# Patient Record
Sex: MALE | Race: BLACK OR AFRICAN AMERICAN | NOT HISPANIC OR LATINO | Employment: OTHER | URBAN - METROPOLITAN AREA
[De-identification: names, ages, dates, MRNs, and addresses within clinical notes are randomized per-mention and may not be internally consistent; named-entity substitution may affect disease eponyms.]

---

## 2019-05-13 LAB — INR PPP: 1 (ref 0.9–1.2)

## 2020-09-30 LAB
CREAT SERPL-MCNC: 1.03 MG/DL (ref 0.74–1.35)
GFR SERPL CREATININE-BSD FRML MDRD: 85 ML/MIN/BSA
GLUCOSE SERPL-MCNC: 107 MG/DL (ref 70–140)
POTASSIUM SERPL-SCNC: 3.3 MMOL/L (ref 3.6–5.2)
TSH SERPL-ACNC: 0.6 MIU/L (ref 0.3–4.2)

## 2020-11-12 ENCOUNTER — TRANSFERRED RECORDS (OUTPATIENT)
Dept: HEALTH INFORMATION MANAGEMENT | Facility: CLINIC | Age: 50
End: 2020-11-12

## 2020-12-04 ENCOUNTER — TRANSFERRED RECORDS (OUTPATIENT)
Dept: MULTI SPECIALTY CLINIC | Facility: CLINIC | Age: 50
End: 2020-12-04

## 2020-12-04 LAB
ALT SERPL-CCNC: 34 U/L (ref 7–55)
AST SERPL-CCNC: 29 U/L (ref 8–48)
CHOLEST SERPL-MCNC: 151 MG/DL
HBA1C MFR BLD: 6 % (ref 4–5.6)
HDLC SERPL-MCNC: 55 MG/DL
LDLC SERPL CALC-MCNC: 77 MG/DL
NONHDLC SERPL-MCNC: 96 MG/DL
TRIGL SERPL-MCNC: 96 MG/DL

## 2021-01-26 ENCOUNTER — OFFICE VISIT (OUTPATIENT)
Dept: FAMILY MEDICINE | Facility: CLINIC | Age: 51
End: 2021-01-26
Payer: MEDICARE

## 2021-01-26 VITALS
BODY MASS INDEX: 26.66 KG/M2 | TEMPERATURE: 96.8 F | OXYGEN SATURATION: 98 % | SYSTOLIC BLOOD PRESSURE: 106 MMHG | RESPIRATION RATE: 16 BRPM | HEIGHT: 73 IN | DIASTOLIC BLOOD PRESSURE: 62 MMHG | HEART RATE: 73 BPM | WEIGHT: 201.2 LBS

## 2021-01-26 DIAGNOSIS — F17.200 TOBACCO USE DISORDER: ICD-10-CM

## 2021-01-26 DIAGNOSIS — F20.9 SCHIZOPHRENIA, UNSPECIFIED TYPE (H): ICD-10-CM

## 2021-01-26 DIAGNOSIS — E78.2 MIXED HYPERLIPIDEMIA: Primary | ICD-10-CM

## 2021-01-26 PROBLEM — H57.10 PAIN IN EYE: Status: ACTIVE | Noted: 2017-12-18

## 2021-01-26 PROBLEM — R39.15 URINARY URGENCY: Status: ACTIVE | Noted: 2019-03-19

## 2021-01-26 PROBLEM — K21.00 REFLUX ESOPHAGITIS: Status: ACTIVE | Noted: 2019-08-23

## 2021-01-26 PROBLEM — B36.0 PITYRIASIS VERSICOLOR: Status: ACTIVE | Noted: 2018-06-25

## 2021-01-26 PROBLEM — R41.83 BORDERLINE INTELLECTUAL FUNCTIONING: Status: ACTIVE | Noted: 2017-01-16

## 2021-01-26 PROBLEM — K63.5 POLYP OF COLON: Status: ACTIVE | Noted: 2019-09-23

## 2021-01-26 PROBLEM — F51.04 PSYCHOPHYSIOLOGICAL INSOMNIA: Status: ACTIVE | Noted: 2019-10-23

## 2021-01-26 PROBLEM — R10.32 LEFT LOWER QUADRANT ABDOMINAL PAIN: Status: ACTIVE | Noted: 2019-08-06

## 2021-01-26 PROBLEM — N39.44 NOCTURNAL ENURESIS: Status: ACTIVE | Noted: 2019-10-02

## 2021-01-26 PROBLEM — B37.81 CANDIDIASIS OF ESOPHAGUS (H): Status: ACTIVE | Noted: 2019-08-26

## 2021-01-26 PROBLEM — B96.81 GASTRITIS DUE TO HELICOBACTER SPECIES: Status: ACTIVE | Noted: 2019-08-26

## 2021-01-26 PROBLEM — K29.70 GASTRITIS DUE TO HELICOBACTER SPECIES: Status: ACTIVE | Noted: 2019-08-26

## 2021-01-26 PROBLEM — F22 DELUSIONAL DISORDER (H): Status: ACTIVE | Noted: 2017-01-17

## 2021-01-26 PROBLEM — B07.0 VERRUCA PLANTARIS: Status: ACTIVE | Noted: 2018-10-24

## 2021-01-26 PROBLEM — F12.10 NONDEPENDENT CANNABIS ABUSE: Status: ACTIVE | Noted: 2017-01-16

## 2021-01-26 PROBLEM — G45.9 TRANSIENT ISCHEMIC ATTACK: Status: ACTIVE | Noted: 2018-01-25

## 2021-01-26 PROBLEM — G24.01 DRUG INDUCED SUBACUTE DYSKINESIA: Status: ACTIVE | Noted: 2017-01-16

## 2021-01-26 PROBLEM — F25.0 SCHIZOAFFECTIVE DISORDER, BIPOLAR TYPE (H): Status: ACTIVE | Noted: 2017-01-16

## 2021-01-26 PROBLEM — K59.00 CONSTIPATION: Status: ACTIVE | Noted: 2019-08-06

## 2021-01-26 PROBLEM — R63.0 ANOREXIA: Status: ACTIVE | Noted: 2019-10-02

## 2021-01-26 PROBLEM — J45.30 MILD PERSISTENT ASTHMA: Status: ACTIVE | Noted: 2018-04-27

## 2021-01-26 PROBLEM — K62.5 RECTAL HEMORRHAGE: Status: ACTIVE | Noted: 2019-08-06

## 2021-01-26 PROBLEM — E78.5 HYPERLIPIDEMIA: Status: ACTIVE | Noted: 2018-01-23

## 2021-01-26 PROBLEM — R07.9 CHEST PAIN: Status: ACTIVE | Noted: 2018-01-25

## 2021-01-26 PROCEDURE — 99204 OFFICE O/P NEW MOD 45 MIN: CPT | Performed by: FAMILY MEDICINE

## 2021-01-26 RX ORDER — ROSUVASTATIN CALCIUM 10 MG/1
10 TABLET, COATED ORAL DAILY
Qty: 90 TABLET | Refills: 3 | Status: SHIPPED | OUTPATIENT
Start: 2021-01-26 | End: 2021-12-17

## 2021-01-26 RX ORDER — ASPIRIN 81 MG/1
81 TABLET, CHEWABLE ORAL
COMMUNITY
Start: 2020-12-09 | End: 2021-02-16

## 2021-01-26 RX ORDER — NICOTINE 21 MG/24HR
1 PATCH, TRANSDERMAL 24 HOURS TRANSDERMAL EVERY 24 HOURS
Qty: 30 PATCH | Refills: 0 | Status: SHIPPED | OUTPATIENT
Start: 2021-01-26 | End: 2022-02-08

## 2021-01-26 RX ORDER — AMOXICILLIN 250 MG
1 CAPSULE ORAL
COMMUNITY
Start: 2020-12-09 | End: 2021-02-16

## 2021-01-26 RX ORDER — OLANZAPINE 15 MG/1
15 TABLET ORAL AT BEDTIME
COMMUNITY
Start: 2020-12-09 | End: 2022-02-08

## 2021-01-26 RX ORDER — ROSUVASTATIN CALCIUM 10 MG/1
10 TABLET, COATED ORAL
COMMUNITY
Start: 2020-12-09 | End: 2021-01-26

## 2021-01-26 RX ORDER — ACETAMINOPHEN 500 MG
500 TABLET ORAL
COMMUNITY
Start: 2020-10-19 | End: 2021-04-12

## 2021-01-26 RX ORDER — FAMOTIDINE 20 MG/1
20 TABLET, FILM COATED ORAL
COMMUNITY
Start: 2020-12-09 | End: 2021-02-16

## 2021-01-26 RX ORDER — LANOLIN ALCOHOL/MO/W.PET/CERES
6 CREAM (GRAM) TOPICAL
COMMUNITY
Start: 2020-12-09 | End: 2022-06-14

## 2021-01-26 ASSESSMENT — MIFFLIN-ST. JEOR: SCORE: 1823.58

## 2021-01-26 NOTE — PROGRESS NOTES
"  Assessment & Plan     Mixed hyperlipidemia  Reviewed Care Everywhere. Patient had normal FLP a month ago.  Reinforced tatin use.  Reinforced heart healthy lifestyle.  Repeat FLP in Dec 2021  - rosuvastatin (CRESTOR) 10 MG tablet  Dispense: 90 tablet; Refill: 3    Schizophrenia, unspecified type (H)  At end of visit he mentioned to provider that he would like to have \"this snake that is growing in my taken out\", but did not want to pursue this today.  Patient states he sees psychiatry. Patient was advised to make sure he follows up with psychiatry.    Tobacco use disorder  Patient requested Rx for nicotine patch.  Advised safe use of this and how to improve cessation success,.  Follow up with PCP in 1 month for continuation of process.  - nicotine (NICODERM CQ) 21 MG/24HR 24 hr patch  Dispense: 30 patch; Refill: 0      Review of prior external note(s) from - CareEverywhere information from [place] reviewed  Review of the result(s) of each unique test - fasting lipid panel    Tobacco Cessation:   reports that he has been smoking cigarettes. He has a 11.50 pack-year smoking history. He has never used smokeless tobacco.  Tobacco Cessation Action Plan: Pharmacotherapies : Nicotine patch    BMI:   Estimated body mass index is 26.91 kg/m  as calculated from the following:    Height as of this encounter: 1.842 m (6' 0.5\").    Weight as of this encounter: 91.3 kg (201 lb 3.2 oz).   Weight management plan: Discussed healthy diet and exercise guidelines      Patient Instructions     Refill for rosuvastatin has been sent to your pharmacy.    Be consistent with low trans fat and saturated fat diet.  Eat food rich in omega-3-fatty acids as you tolerate. (salmon, olive oil)  Eat 5 cups of vegetables, fruits and whole grains per day.  Limit starchy food (white rice, white bread, white pasta, white potatoes) to less than a cup per meal.  Minimize sweets, junk food and fastfood. Limit soda beverages to one serving per day; best to " avoid it altogether though.  Exercise: moderate intensity sustained for at least 30 mins per episode, goal of 150 mins per week at least  Combine cardiovascular and resistance exercises.  These exercise recommendations are in addition to your daily activity at work or home.  Work on losing weight if you are above your goal body mass index.    You preferred to start nicotine patch to stop smoking.  Use this as directed.  When you start the patches, you need to completely stop smoking cigarettes or using other nicotine products.    Patient Education     Low-Fat Diet     A low-fat diet will help you lose weight. It also can lower cholesterol and prevent symptoms of gallbladder disease. The average American diet contains up to 50% fat. This means that half of all calories come from fat (about 80 grams to 100 grams of fat per day). Choosing normal portions of foods from the list below can help lower your fat intake. Experts recommend that only 20% to 35% of your daily calories come from fat. The remaining 65% to 80% of calories will come from protein and carbohydrates.  Breads  OK: Whole-wheat or rye bread, lana or soda crackers, julia toast, plain rolls, bagels, English muffins  Don't have: Rolls and breads containing whole milk or egg; waffles, pancakes, biscuits, corn bread; cheese crackers, other flavored crackers, pastries, doughnuts  Cereals  OK: Oatmeal, whole-wheat, bran, multigrain, rice  Don't have:Granola or other cereals that have oil, coconut, or more than 2 grams of fat per serving  Cheese and eggs  OK: Cheeses labeled low-fat; 3 whole eggs per week; egg whites and egg substitutes as desired  Don't have: All other cheeses  Desserts  OK: Gelatin, slushy, edvin food cake, meringues, nonfat yogurt, and puddings or sherbet made with nonfat milk  Don't have: Any other store-bought desserts, or desserts that have fat, whole milk, cream, chocolate, and coconut  Drinks  OK: Nonfat milk, coffee, tea, fizzy  (carbonated) drinks  Don't have:Whole and reduced-fat milk, evaporated and condensed milk, hot chocolate mixes, milk shakes, malts, eggnog  Fats  OK: You may have up to 3 teaspoons of fat daily. This can be butter, margarine, mayonnaise, or healthy oils (canola or olive)  Don't have: Cream, nondairy creams, cream cheese, gravies, and cream sauces  Fruits  OK: All fruits made without fat  Don't have: Coconut, olives  Meats, poultry, fish  OK: Limit meat to 6 ounces daily (broiled, roasted, baked, grilled, or boiled). Buy lean cuts, and trim off the fat. Try beef, fish, lamb, pork, and canned fish packed in water; also chicken and turkey with the skin removed.  Don't have: Fried meats, fish, or poultry; fried eggs, and fish canned in oils; fatty meats such as walton, sausage, corned beef, hot dogs, and lunch meats; meats with gravies and sauces  Potatoes, beans, pasta  OK: Dried beans, split peas, lentils, potatoes, rice, pasta made without added fat  Don't have: French fries, potato chips, potatoes prepared with butter, refried beans  Soups  OK: Clear broth soups without fat and with allowed vegetables  Don't have: Cream-based soups  Vegetables  OK: Fresh, frozen, canned or dried vegetables, all made without added fat  Don't have: Fried vegetables and those prepared with butter, cream, sauces  Other foods  OK: Salt, sugar, jelly, hard candy, marshmallows, honey, syrup, spices and herbs, mustard, ketchup, lemon, and vinegar. Try to limit sweets and added sugars.  Don't have: Chocolate, nuts, coconut, and cream candies; sunflower, sesame, and other seeds; fried foods; cream sauces and gravies; pizza  [employer] last reviewed this educational content on 10/1/2019    1349-4130 [employer], [employer]. [address] [place] [address]. All rights reserved. This information is not intended as a substitute for professional medical care. Always follow your healthcare professional's instructions.           Patient  Education     Tips for Quitting Smoking (Cardiovascular)  Quitting smoking is a gift to yourself. It's one of the best things you can do to keep your heart disease from getting worse. Smoking reduces oxygen flow to your heart. It does this in 2 ways. It speeds the buildup of plaque along the artery walls. And it changes the health of your blood vessels. This raises your risk for heart attack, also known as acute myocardial infarction (AMI). Quitting helps reduce smoking's harmful effects. You may have tried to quit before, but don t give up. Try again. Many smokers try a few times before they succeed. It's never too early to benefit from quitting smoking. This is especially true if you already have ongoing (chronic) conditions such as high blood pressure and high cholesterol. These put you at higher risk for cardiovascular disease. Quitting smoking is a powerful way to reduce your risk for coronary disease.   Line up help      Ask for the support of your family and friends.    Join a smoking cessation class. Or ask your healthcare provider for a referral to a psychologist who specializes in helping people quit smoking.     Ask your healthcare provider about nicotine replacement products. Also ask about prescription medicines that can help you quit. It may take some time to find a product and schedule that works for you.  Set a quit date    Choose a date in the next 2 to 4 weeks.    After picking a day, irene it in bold letters on a calendar.    Your quit list  Ideas to stop smoking include:  1. Start by giving up cigarettes at the times you least need them.  2. Keep some fruit close by at the times you are most likely to reach for a cigarette. For many people, smoking has an oral fixation component. This must be recognized and replaced by a healthy habit.  3. Use a nicotine replacement product instead of a cigarette.  Write down a few more  ideas:  ______________________________________________________________________________  ______________________________________________________________________________  ______________________________________________________________________________  Set limits    Limit where you can smoke. Pick one room or a porch. Smoke only in that place.    Make smoking outdoors a house rule. Other smokers won t tempt you as much.    Talk with smokers around you about your intent to stop smoking. Then they can show consideration for you and limit their smoking around you.    Hang a list of  quit benefits  in the spot where you smoke. Put one on the refrigerator and one on your car dashboard.    For more information    National Cancer [place] Smoking Quitlinesmokefree.gov/qhhg-oz-dy-hqfeac063-91B-FDYY (368-002-7185)    [employer] last reviewed this educational content on 11/1/2019 2000-2020 [employer]. [address] [place] [address], [place] [address]. All rights reserved. This information is not intended as a substitute for professional medical care. Always follow your healthcare professional's instructions.           Patient Education     Staying Smoke-Free  Quitting smoking is a big change. People will congratulate you. You have the right to be proud. But later at times you may miss smoking. Plan ahead to resist temptation.   Prepare to be tempted    If you feel the urge to smoke, distract yourself for about 5 to 15 minutes. Drink water. Call a friend, take a walk, or try deep breathing. Chew gum. Usually, the urge to smoke will pass.    Don t trust yourself to have  just one cigarette.  Many ex-smokers get hooked again that way.    Remind yourself why you quit. Tell yourself you can stay quit.    Stay away from people or places that can trigger you to smoke. Ask others not to smoke in your home or car.    Spend time in places where you can t smoke, such as a museum, a library, a store, or a gym.    Take your nonsmoking  life one day at a time. Ruy each day on your calendar.    HALT your desire. Keep yourself from feeling too Hungry, Angry, Lonely, or Tired. Deal with your real needs. Eat, talk, or sleep.    Reward yourself! Put aside cigarette money and buy yourself a treat.    Talk with your healthcare provider about using quit-smoking products. These include medicine or a nicotine patch, inhaler, nasal spray, gum, or lozenges. These can help increase your success by reducing urges.     Deep breathing can help ease the urge to smoke.     If you slip  You may slip and smoke again. Many ex-smokers slip on the way to success. If you do, it s not the end of your quit process. Think about what triggered you to smoke. Then think of ways to prevent future slips. Ask yourself what you can learn from the slip. Decide how you will handle this trigger better in the future. Then get back on track--right away!   Don t give up  Keep telling yourself you re no longer a smoker. Don t lose hope. Most people have tried to quit several times before being successful. Try to stay focused on your plan to be smoke-free. Keep in mind all the benefits of staying quit. Millions of people have given up smoking. You can too.   To learn more    www.cdc.gov/tobacco/quit_smoking/ 800-QUIT-NOW (430-523-6761)    www.smokefree.gov 877-44U-QUIT (946-298-1706)    www.lung.org/stop-smoking/ 800-LUNGUSA (497-229-9401)    [employer] last reviewed this educational content on 5/1/2019 2000-2020 [employer]. [address] [place] [address], [place] [address]. All rights reserved. This information is not intended as a substitute for professional medical care. Always follow your healthcare professional's instructions.               Return in about 1 month (around 2/26/2021) for tobacco cessation follow up.    Dixon Pederson MD  [facility]    Funmilayo Champagne is a 50 year old who presents to clinic today for the following health  "issues   Chief Complaint   Patient presents with     Establish Care     Pt here to establish care with new provider, transferring care from [facility].     Refill Request     cholesterol medication   Records in care everywhere.    HPI       Hyperlipidemia Follow-Up      Are you regularly taking any medication or supplement to lower your cholesterol?   Yes- rovustatin    Are you having muscle aches or other side effects that you think could be caused by your cholesterol lowering medication?  No  Patient said he has had many bouts of chest pain before - has been diagnosed with \"mild heart attacks\".  Patient reports he is not seeing a cardiologist.  Denies current chest pain.  Patient reports has also a hx of GERD.  Patient was driven by a friend to clinic today.    Denies chest pain currently, dyspnea, HA, BOV, dizziness or urinary changes.      How many servings of fruits and vegetables do you eat daily?  4 or more    On average, how many sweetened beverages do you drink each day (Examples: soda, juice, sweet tea, etc.  Do NOT count diet or artificially sweetened beverages)?   7 or 8 juice    How many days per week do you exercise enough to make your heart beat faster? 7    How many minutes a day do you exercise enough to make your heart beat faster? Walks for 1/2 mile    How many days per week do you miss taking your medication? 0    Medication Followup of randa    Taking Medication as prescribed: yes    Side Effects:  None    Medication Helping Symptoms:  yes     Patient sees a psychiatrist locally. He cannot remember the name.    Patient Active Problem List   Diagnosis     Schizophrenia (H)     M[place]: positive     History reviewed. No pertinent surgical history.    Social History     Tobacco Use     Smoking status: Current Every Day Smoker     Packs/day: 0.50     Years: 23.00     Pack years: 11.50     Types: Cigarettes     Smokeless tobacco: Never Used     Tobacco comment: Slowing down a lot " "  Substance Use Topics     Alcohol use: No     Family History   Problem Relation Age of Onset     Diabetes Mother      Coronary Artery Disease Mother      Coronary Artery Disease Father      Cancer No family hx of          Current Outpatient Medications   Medication Sig Dispense Refill     acetaminophen (TYLENOL) 500 MG tablet Take 500 mg by mouth       aspirin (ASA) 81 MG chewable tablet Take 81 mg by mouth       famotidine (PEPCID) 20 MG tablet Take 20 mg by mouth       melatonin 3 MG tablet Take 6 mg by mouth       OLANZapine (ZYPREXA) 15 MG tablet Take 15 mg by mouth       rosuvastatin (CRESTOR) 10 MG tablet Take 10 mg by mouth       senna-docusate (SENOKOT-S/PERICOLACE) 8.6-50 MG tablet Take 1 tablet by mouth       Allergies   Allergen Reactions     Fish Allergy      Fish Oil      Latex      Olive Oil      Review of Systems   Constitutional, HEENT, cardiovascular, pulmonary, GI, , musculoskeletal, neuro, skin, endocrine and psych systems are negative, except as otherwise noted.      Objective    /62   Pulse 73   Temp 96.8  F (36  C) (Tympanic)   Resp 16   Ht 1.842 m (6' 0.5\")   Wt 91.3 kg (201 lb 3.2 oz)   SpO2 98%   BMI 26.91 kg/m    Body mass index is 26.91 kg/m .  Physical Exam   GENERAL: alert and no distress, ambulatory w/o assist  EYES: no icterus, PERRLA  NECK: no tenderness, no adenopathy,  Thyroid not enlarged  RESP: lungs clear to auscultation - no rales, no rhonchi, no wheezes  CV: regular rates and rhythm, no murmur  MS: no edema  SKIN: no jaundice or rash  NEURO: no tremors, no akithesia, no involuntary movement.  PSYCH: well-kempt,  Slight tangentiality, present delusion of a snake growing in him, normal speech,normal mood, appropriate affect, no suicidality, no aggression, no auditory or visual hallucination today  ABD: rounded, nontender, no hepatosplenomegaly, no palpable mass    No results found for any visits on 01/26/21.        "

## 2021-01-26 NOTE — PATIENT INSTRUCTIONS
Refill for rosuvastatin has been sent to your pharmacy.    Be consistent with low trans fat and saturated fat diet.  Eat food rich in omega-3-fatty acids as you tolerate. (salmon, olive oil)  Eat 5 cups of vegetables, fruits and whole grains per day.  Limit starchy food (white rice, white bread, white pasta, white potatoes) to less than a cup per meal.  Minimize sweets, junk food and fastfood. Limit soda beverages to one serving per day; best to avoid it altogether though.  Exercise: moderate intensity sustained for at least 30 mins per episode, goal of 150 mins per week at least  Combine cardiovascular and resistance exercises.  These exercise recommendations are in addition to your daily activity at work or home.  Work on losing weight if you are above your goal body mass index.    You preferred to start nicotine patch to stop smoking.  Use this as directed.  When you start the patches, you need to completely stop smoking cigarettes or using other nicotine products.    Patient Education     Low-Fat Diet     A low-fat diet will help you lose weight. It also can lower cholesterol and prevent symptoms of gallbladder disease. The average American diet contains up to 50% fat. This means that half of all calories come from fat (about 80 grams to 100 grams of fat per day). Choosing normal portions of foods from the list below can help lower your fat intake. Experts recommend that only 20% to 35% of your daily calories come from fat. The remaining 65% to 80% of calories will come from protein and carbohydrates.  Breads  OK: Whole-wheat or rye bread, lana or soda crackers, julia toast, plain rolls, bagels, English muffins  Don't have: Rolls and breads containing whole milk or egg; waffles, pancakes, biscuits, corn bread; cheese crackers, other flavored crackers, pastries, doughnuts  Cereals  OK: Oatmeal, whole-wheat, bran, multigrain, rice  Don't have:Granola or other cereals that have oil, coconut, or more than 2 grams  of fat per serving  Cheese and eggs  OK: Cheeses labeled low-fat; 3 whole eggs per week; egg whites and egg substitutes as desired  Don't have: All other cheeses  Desserts  OK: Gelatin, slushy, edvin food cake, meringues, nonfat yogurt, and puddings or sherbet made with nonfat milk  Don't have: Any other store-bought desserts, or desserts that have fat, whole milk, cream, chocolate, and coconut  Drinks  OK: Nonfat milk, coffee, tea, fizzy (carbonated) drinks  Don't have:Whole and reduced-fat milk, evaporated and condensed milk, hot chocolate mixes, milk shakes, malts, eggnog  Fats  OK: You may have up to 3 teaspoons of fat daily. This can be butter, margarine, mayonnaise, or healthy oils (canola or olive)  Don't have: Cream, nondairy creams, cream cheese, gravies, and cream sauces  Fruits  OK: All fruits made without fat  Don't have: Coconut, olives  Meats, poultry, fish  OK: Limit meat to 6 ounces daily (broiled, roasted, baked, grilled, or boiled). Buy lean cuts, and trim off the fat. Try beef, fish, lamb, pork, and canned fish packed in water; also chicken and turkey with the skin removed.  Don't have: Fried meats, fish, or poultry; fried eggs, and fish canned in oils; fatty meats such as walton, sausage, corned beef, hot dogs, and lunch meats; meats with gravies and sauces  Potatoes, beans, pasta  OK: Dried beans, split peas, lentils, potatoes, rice, pasta made without added fat  Don't have: French fries, potato chips, potatoes prepared with butter, refried beans  Soups  OK: Clear broth soups without fat and with allowed vegetables  Don't have: Cream-based soups  Vegetables  OK: Fresh, frozen, canned or dried vegetables, all made without added fat  Don't have: Fried vegetables and those prepared with butter, cream, sauces  Other foods  OK: Salt, sugar, jelly, hard candy, marshmallows, honey, syrup, spices and herbs, mustard, ketchup, lemon, and vinegar. Try to limit sweets and added sugars.  Don't have:  Chocolate, nuts, coconut, and cream candies; sunflower, sesame, and other seeds; fried foods; cream sauces and gravies; pizza  [employer] last reviewed this educational content on 10/1/2019    4293-9331 [employer]. [address] [place] [id number]. All rights reserved. This information is not intended as a substitute for professional medical care. Always follow your healthcare professional's instructions.           Patient Education     Tips for Quitting Smoking (Cardiovascular)  Quitting smoking is a gift to yourself. It's one of the best things you can do to keep your heart disease from getting worse. Smoking reduces oxygen flow to your heart. It does this in 2 ways. It speeds the buildup of plaque along the artery walls. And it changes the health of your blood vessels. This raises your risk for heart attack, also known as acute myocardial infarction (AMI). Quitting helps reduce smoking's harmful effects. You may have tried to quit before, but don t give up. Try again. Many smokers try a few times before they succeed. It's never too early to benefit from quitting smoking. This is especially true if you already have ongoing (chronic) conditions such as high blood pressure and high cholesterol. These put you at higher risk for cardiovascular disease. Quitting smoking is a powerful way to reduce your risk for coronary disease.   Line up help      Ask for the support of your family and friends.    Join a smoking cessation class. Or ask your healthcare provider for a referral to a psychologist who specializes in helping people quit smoking.     Ask your healthcare provider about nicotine replacement products. Also ask about prescription medicines that can help you quit. It may take some time to find a product and schedule that works for you.  Set a quit date    Choose a date in the next 2 to 4 weeks.    After picking a day, irene it in bold letters on a calendar.    Your quit list  Ideas to stop  smoking include:  1. Start by giving up cigarettes at the times you least need them.  2. Keep some fruit close by at the times you are most likely to reach for a cigarette. For many people, smoking has an oral fixation component. This must be recognized and replaced by a healthy habit.  3. Use a nicotine replacement product instead of a cigarette.  Write down a few more ideas:  ______________________________________________________________________________  ______________________________________________________________________________  ______________________________________________________________________________  Set limits    Limit where you can smoke. Pick one room or a porch. Smoke only in that place.    Make smoking outdoors a house rule. Other smokers won t tempt you as much.    Talk with smokers around you about your intent to stop smoking. Then they can show consideration for you and limit their smoking around you.    Hang a list of  quit benefits  in the spot where you smoke. Put one on the refrigerator and one on your car dashboard.    For more information    National Cancer [place] Smoking Quitlinesmokefree.gov/qmbp-cl-vo-gncfdp878-19A-TFMJ (400-479-5727)    Arias last reviewed this educational content on 11/1/2019 2000-2020 [employer]. [address] [place] [address], [place] [address]. All rights reserved. This information is not intended as a substitute for professional medical care. Always follow your healthcare professional's instructions.           Patient Education     Staying Smoke-Free  Quitting smoking is a big change. People will congratulate you. You have the right to be proud. But later at times you may miss smoking. Plan ahead to resist temptation.   Prepare to be tempted    If you feel the urge to smoke, distract yourself for about 5 to 15 minutes. Drink water. Call a friend, take a walk, or try deep breathing. Chew gum. Usually, the urge to smoke will pass.    Don t trust  yourself to have  just one cigarette.  Many ex-smokers get hooked again that way.    Remind yourself why you quit. Tell yourself you can stay quit.    Stay away from people or places that can trigger you to smoke. Ask others not to smoke in your home or car.    Spend time in places where you can t smoke, such as a museum, a library, a store, or a gym.    Take your nonsmoking life one day at a time. Ruy each day on your calendar.    HALT your desire. Keep yourself from feeling too Hungry, Angry, Lonely, or Tired. Deal with your real needs. Eat, talk, or sleep.    Reward yourself! Put aside cigarette money and buy yourself a treat.    Talk with your healthcare provider about using quit-smoking products. These include medicine or a nicotine patch, inhaler, nasal spray, gum, or lozenges. These can help increase your success by reducing urges.     Deep breathing can help ease the urge to smoke.     If you slip  You may slip and smoke again. Many ex-smokers slip on the way to success. If you do, it s not the end of your quit process. Think about what triggered you to smoke. Then think of ways to prevent future slips. Ask yourself what you can learn from the slip. Decide how you will handle this trigger better in the future. Then get back on track--right away!   Don t give up  Keep telling yourself you re no longer a smoker. Don t lose hope. Most people have tried to quit several times before being successful. Try to stay focused on your plan to be smoke-free. Keep in mind all the benefits of staying quit. Millions of people have given up smoking. You can too.   To learn more    www.cdc.gov/tobacco/quit_smoking/ 800-QUIT-NOW (792-222-1389)    www.smokefree.gov 877-44U-QUIT (640-836-5026)    www.lung.org/stop-smoking/ 800-LUNGUSA (042-319-4843)    Arias last reviewed this educational content on 5/1/2019 2000-2020 [employer]. [address] [place] [address], [place] [address]. All rights reserved. This  information is not intended as a substitute for professional medical care. Always follow your healthcare professional's instructions.

## 2021-02-02 ENCOUNTER — OFFICE VISIT (OUTPATIENT)
Dept: FAMILY MEDICINE | Facility: CLINIC | Age: 51
End: 2021-02-02
Payer: MEDICARE

## 2021-02-02 VITALS
HEART RATE: 71 BPM | OXYGEN SATURATION: 97 % | BODY MASS INDEX: 28.04 KG/M2 | RESPIRATION RATE: 14 BRPM | SYSTOLIC BLOOD PRESSURE: 122 MMHG | TEMPERATURE: 97.5 F | DIASTOLIC BLOOD PRESSURE: 76 MMHG | HEIGHT: 72 IN | WEIGHT: 207 LBS

## 2021-02-02 DIAGNOSIS — F22 DELUSIONAL DISORDER (H): ICD-10-CM

## 2021-02-02 DIAGNOSIS — K21.9 GASTROESOPHAGEAL REFLUX DISEASE WITHOUT ESOPHAGITIS: ICD-10-CM

## 2021-02-02 DIAGNOSIS — B37.81 CANDIDIASIS OF ESOPHAGUS (H): ICD-10-CM

## 2021-02-02 DIAGNOSIS — R41.83 BORDERLINE INTELLECTUAL FUNCTIONING: ICD-10-CM

## 2021-02-02 DIAGNOSIS — F25.0 SCHIZOAFFECTIVE DISORDER, BIPOLAR TYPE (H): ICD-10-CM

## 2021-02-02 DIAGNOSIS — K63.5 POLYP OF COLON, UNSPECIFIED PART OF COLON, UNSPECIFIED TYPE: ICD-10-CM

## 2021-02-02 DIAGNOSIS — R11.2 NON-INTRACTABLE VOMITING WITH NAUSEA, UNSPECIFIED VOMITING TYPE: Primary | ICD-10-CM

## 2021-02-02 DIAGNOSIS — F20.9 SCHIZOPHRENIA, UNSPECIFIED TYPE (H): ICD-10-CM

## 2021-02-02 DIAGNOSIS — Z86.19 HISTORY OF HELICOBACTER PYLORI INFECTION: ICD-10-CM

## 2021-02-02 DIAGNOSIS — R19.5 CHANGE IN STOOL: ICD-10-CM

## 2021-02-02 PROCEDURE — 99215 OFFICE O/P EST HI 40 MIN: CPT | Performed by: NURSE PRACTITIONER

## 2021-02-02 ASSESSMENT — MIFFLIN-ST. JEOR: SCORE: 1837.74

## 2021-02-02 ASSESSMENT — PAIN SCALES - GENERAL: PAINLEVEL: SEVERE PAIN (6)

## 2021-02-02 NOTE — PROGRESS NOTES
Assessment & Plan       ICD-10-CM    1. Non-intractable vomiting with nausea, unspecified vomiting type  R11.2 CT Abdomen w/o Contrast   2. Change in stool  R19.5 CT Abdomen w/o Contrast   3. Gastroesophageal reflux disease without esophagitis  K21.9 GASTROENTEROLOGY ADULT REF CONSULT ONLY     CT Abdomen w/o Contrast   4. History of Helicobacter pylori infection  Z86.19 Helicobacter pylori Antigen Stool     GASTROENTEROLOGY ADULT REF CONSULT ONLY   5. Polyp of colon, unspecified part of colon, unspecified type  K63.5 GASTROENTEROLOGY ADULT REF CONSULT ONLY   6. Candidiasis of esophagus (H)  B37.81 GASTROENTEROLOGY ADULT REF CONSULT ONLY   7. Schizophrenia, unspecified type (H)  F20.9    8. Borderline intellectual functioning  R41.83    9. Delusional disorder (H)  F22    10. Schizoaffective disorder, bipolar type (H)  F25.0      1-6.  Etiology of abdominal symptoms are unclear.  Reviewing history does reveal that he has had H. pylori that it would be worthwhile reevaluating this as it could be a cause of his abdominal pain as well as his reflux.  If H. pylori is positive would treat.  In addition given his history of colon polyps and change in stool would recommend reevaluation by gastroenterology and consideration of repeat colonoscopy.  He would be due for colonoscopy in 6 months based on previous recommendations.  CT scan will be completed today as well to evaluate any other significant abnormalities.  Labs were reviewed from December and these were within normal limits including liver and kidney function.  I did not repeat labs today as the symptoms have not changed significantly since that time.    7-10.  In terms of his general mental health advised patient to continue with management through his psychiatrist.  In general does seem to be relatively stable.    Review of external notes as documented above   Review of prior external note(s) from - CareEverywhere information from [facility] reviewed  Review of the  "result(s) of each unique test - Parasitic stool, H pylori, CMP, CBC, numerous stool samples, TSH, colonoscopy, upper endoscopy, lipids      Diagnosis or treatment significantly limited by social determinants of health -patient is a resident of a group home secondary to schizophrenia, delusional disorder, and borderline intellectual functioning.    55 minutes spent on the date of the encounter doing chart review, history and exam, documentation and further activities as noted above         Return in about 4 weeks (around 3/2/2021) for Medicare annual wellness, Follow up.    [facility]    Funmilayo Champagne is a 51 year old who presents to clinic today for the following health issues:     He has a significant past medical history of schizophrenia, borderline intellectual functioning, delusional disorder, schizoaffective bipolar type, esophageal reflux, history of H. pylori, colon polyps.  Seen today for concerns specifically of abdominal pain and a \"worm in my stomach\".  He also mentions a cyst near his perianus that is bothersome and would like evaluated.    HPI     Pt has a cyst in groin area x 2 days is painful he reports that this has been going on for the last several days.  He feels as though is popped he would like to make sure that it is okay.  It is painful to touch.  He has not noticed any bleeding.  No previous history of this that he is aware of.    Medication Followup of rosuvastatin    Taking Medication as prescribed: yes    Side Effects:  YES nausea and vomiting    Medication Helping Symptoms:  yes     Abdominal/Flank Pain  Onset/Duration: 2-3 months seems to have worsened however he has been worked up for H pylori and abdominal pain previously.  It appears based on results through care everywhere that he started having abdominal pain and GI concerns in 2019.  Colonoscopy was completed in 2019 colon polyps were identified.  Recommend repeat " colonoscopy in 2 years.  He has also been diagnosed with esophagitis Candida, H pylori.  Labs were completed in end of 2019 and were all normal within normal limits.  These results are available through care everywhere from [facility].  Over the last 2 to 3 months he describes his abdominal pain as below:  Description:   Character: Gnawing and Cramping  Location: left upper quadrant left lower quadrant ralph-umbilical region  Radiation: Back  Intensity: moderate  Progression of Symptoms:  worsening and constant  Accompanying Signs & Symptoms:  Fever/Chills: no  Gas/Bloating: YES  Nausea: YES  Vomitting: YES  Diarrhea: YES  Constipation: YES  Dysuria or Hematuria: YES  History:   Trauma: no  Previous similar pain: no  Previous tests done: none  Precipitating factors:   Does the pain change with:     Food: YES    Bowel Movement: YES    Urination: no   Other factors:  no  Therapies tried and outcome: None    He is now new to the area living in a new group home in [facility].  He is establishing care today.  In terms of his overall mental health he follows with psychiatry for management of his schizophrenia.  He currently takes Zyprexa.  He is also on rosuvastatin for hyperlipidemia and a daily low-dose aspirin.    Patient Active Problem List   Diagnosis     Schizophrenia (H)     Positive reaction to tuberculin skin test     Anorexia     Bipolar I disorder, single manic episode (H)     Borderline intellectual functioning     Candidiasis of esophagus (H)     Chest pain     Constipation     Delusional disorder (H)     Nicotine dependence     Drug induced subacute dyskinesia     Gastritis due to Helicobacter species     Gastroesophageal reflux disease without esophagitis     Hyperlipidemia     Left lower quadrant abdominal pain     Manic episode (H)     Mild persistent asthma     Nocturnal enuresis     Nondependent cannabis abuse     Pain in eye     Pityriasis versicolor     Polyp of colon     Psychophysiological  "insomnia     Rectal hemorrhage     Reflux esophagitis     Rhinitis, allergic     Schizoaffective disorder, bipolar type (H)     Transient ischemic attack     Urinary urgency     Verruca plantaris     History of Helicobacter pylori infection       Current Outpatient Medications:      acetaminophen (TYLENOL) 500 MG tablet, Take 500 mg by mouth, Disp: , Rfl:      aspirin (ASA) 81 MG chewable tablet, Take 81 mg by mouth, Disp: , Rfl:      famotidine (PEPCID) 20 MG tablet, Take 20 mg by mouth, Disp: , Rfl:      melatonin 3 MG tablet, Take 6 mg by mouth, Disp: , Rfl:      nicotine (NICODERM CQ) 21 MG/24HR 24 hr patch, Place 1 patch onto the skin every 24 hours, Disp: 30 patch, Rfl: 0     OLANZapine (ZYPREXA) 15 MG tablet, Take 15 mg by mouth, Disp: , Rfl:      rosuvastatin (CRESTOR) 10 MG tablet, Take 1 tablet (10 mg) by mouth daily, Disp: 90 tablet, Rfl: 3     senna-docusate (SENOKOT-S/PERICOLACE) 8.6-50 MG tablet, Take 1 tablet by mouth, Disp: , Rfl:       Review of Systems   Constitutional, HEENT, cardiovascular, pulmonary, GI, , musculoskeletal, neuro, skin, endocrine and psych systems are negative, except as otherwise noted.      Objective    /76 (BP Location: Right arm, Patient Position: Sitting, Cuff Size: Adult Large)   Pulse 71   Temp 97.5  F (36.4  C) (Tympanic)   Resp 14   Ht 1.83 m (6' 0.05\")   Wt 93.9 kg (207 lb)   SpO2 97%   BMI 28.04 kg/m    Body mass index is 28.04 kg/m .  Physical Exam   GENERAL: healthy, alert and no distress  NECK: no adenopathy, no asymmetry, masses, or scars and thyroid normal to palpation  RESP: lungs clear to auscultation - no rales, rhonchi or wheezes  CV: regular rate and rhythm, normal S1 S2, no S3 or S4, no murmur, click or rub, no peripheral edema and peripheral pulses strong  ABDOMEN: tenderness generalized but more concentrated RUQ, Rachna Umbillical, LUQ,and LLQ, no organomegaly or masses, liver span normal to percussion, bowel sounds normal, no palpable or " pulsatile masses and no scars, striae, dilated veins, rashes, or lesions  MS: no gross musculoskeletal defects noted, no edema    Labs, diagnositic testing, and imaging reviewed through Care Everywhere.

## 2021-02-02 NOTE — PATIENT INSTRUCTIONS
Referrals placed for CT scan. You need to schedule this.     GI referral placed for further evaluation and on going management.     Return stool sample when complete.

## 2021-02-04 ENCOUNTER — HOSPITAL ENCOUNTER (OUTPATIENT)
Dept: CT IMAGING | Facility: CLINIC | Age: 51
Discharge: HOME | End: 2021-02-04
Attending: NURSE PRACTITIONER | Admitting: NURSE PRACTITIONER
Payer: MEDICARE

## 2021-02-04 DIAGNOSIS — R19.5 CHANGE IN STOOL: ICD-10-CM

## 2021-02-04 DIAGNOSIS — Z86.19 HISTORY OF HELICOBACTER PYLORI INFECTION: ICD-10-CM

## 2021-02-04 DIAGNOSIS — K21.9 GASTROESOPHAGEAL REFLUX DISEASE WITHOUT ESOPHAGITIS: ICD-10-CM

## 2021-02-04 DIAGNOSIS — R11.2 NON-INTRACTABLE VOMITING WITH NAUSEA, UNSPECIFIED VOMITING TYPE: ICD-10-CM

## 2021-02-04 PROCEDURE — 87338 HPYLORI STOOL AG IA: CPT | Performed by: NURSE PRACTITIONER

## 2021-02-04 PROCEDURE — 250N000011 HC RX IP 250 OP 636: Performed by: RADIOLOGY

## 2021-02-04 PROCEDURE — G1004 CDSM NDSC: HCPCS

## 2021-02-04 PROCEDURE — 250N000009 HC RX 250: Performed by: RADIOLOGY

## 2021-02-04 RX ORDER — IOPAMIDOL 755 MG/ML
100 INJECTION, SOLUTION INTRAVASCULAR ONCE
Status: COMPLETED | OUTPATIENT
Start: 2021-02-04 | End: 2021-02-04

## 2021-02-04 RX ADMIN — SODIUM CHLORIDE 65 ML: 9 INJECTION, SOLUTION INTRAVENOUS at 11:18

## 2021-02-04 RX ADMIN — IOPAMIDOL 100 ML: 755 INJECTION, SOLUTION INTRAVENOUS at 11:18

## 2021-02-05 LAB — H PYLORI AG STL QL IA: NEGATIVE

## 2021-02-16 DIAGNOSIS — G45.9 TRANSIENT ISCHEMIC ATTACK: ICD-10-CM

## 2021-02-16 DIAGNOSIS — K59.01 SLOW TRANSIT CONSTIPATION: ICD-10-CM

## 2021-02-16 DIAGNOSIS — K21.9 GASTROESOPHAGEAL REFLUX DISEASE WITHOUT ESOPHAGITIS: Primary | ICD-10-CM

## 2021-02-16 NOTE — TELEPHONE ENCOUNTER
"Requested Prescriptions   Pending Prescriptions Disp Refills     aspirin (ASA) 81 MG chewable tablet       Sig: Take 1 tablet (81 mg) by mouth       Analgesics (Non-Narcotic Tylenol and ASA Only) Passed - 2/16/2021  1:16 PM        Passed - Recent (12 mo) or future (30 days) visit within the authorizing provider's specialty     Patient has had an office visit with the authorizing provider or a provider within the authorizing providers department within the previous 12 mos or has a future within next 30 days. See \"Patient Info\" tab in inbasket, or \"Choose Columns\" in Meds & Orders section of the refill encounter.              Passed - Patient is age 20 years or older     If ASA is flagged for ages under 20 years old. Forward to provider for confirmation Ryes Syndrome is not a concern.              Passed - Medication is active on med list           famotidine (PEPCID) 20 MG tablet       Sig: Take 1 tablet (20 mg) by mouth       H2 Blockers Protocol Passed - 2/16/2021  1:16 PM        Passed - Patient is age 12 or older        Passed - Recent (12 mo) or future (30 days) visit within the authorizing provider's specialty     Patient has had an office visit with the authorizing provider or a provider within the authorizing providers department within the previous 12 mos or has a future within next 30 days. See \"Patient Info\" tab in inbasket, or \"Choose Columns\" in Meds & Orders section of the refill encounter.              Passed - Medication is active on med list           senna-docusate (SENOKOT-S/PERICOLACE) 8.6-50 MG tablet       Sig: Take 1 tablet by mouth       Laxatives Protocol Passed - 2/16/2021  1:16 PM        Passed - Patient is age 6 or older        Passed - Recent (12 mo) or future (30 days) visit within the authorizing provider's specialty     Patient has had an office visit with the authorizing provider or a provider within the authorizing providers department within the previous 12 mos or has a future " "within next 30 days. See \"Patient Info\" tab in inbasket, or \"Choose Columns\" in Meds & Orders section of the refill encounter.              Passed - Medication is active on med list             "

## 2021-02-22 RX ORDER — FAMOTIDINE 20 MG/1
20 TABLET, FILM COATED ORAL 2 TIMES DAILY PRN
Qty: 180 TABLET | Refills: 3 | Status: SHIPPED | OUTPATIENT
Start: 2021-02-22 | End: 2022-05-09

## 2021-02-22 RX ORDER — AMOXICILLIN 250 MG
1 CAPSULE ORAL DAILY PRN
Qty: 90 TABLET | Refills: 3 | Status: SHIPPED | OUTPATIENT
Start: 2021-02-22 | End: 2022-03-30

## 2021-02-22 RX ORDER — ASPIRIN 81 MG/1
81 TABLET, CHEWABLE ORAL DAILY
Qty: 90 TABLET | Refills: 3 | Status: SHIPPED | OUTPATIENT
Start: 2021-02-22 | End: 2022-02-17

## 2021-02-22 NOTE — TELEPHONE ENCOUNTER
Routing refill request to provider for review/approval because:  Meds listed below are listed as Historical on 2/2/21.  Please place new orders.  Advise.  KPavelRJOSÉ ANTONIO

## 2021-03-08 ENCOUNTER — OFFICE VISIT (OUTPATIENT)
Dept: FAMILY MEDICINE | Facility: CLINIC | Age: 51
End: 2021-03-08
Payer: MEDICARE

## 2021-03-08 VITALS
SYSTOLIC BLOOD PRESSURE: 122 MMHG | OXYGEN SATURATION: 98 % | HEART RATE: 64 BPM | WEIGHT: 205 LBS | HEIGHT: 72 IN | DIASTOLIC BLOOD PRESSURE: 60 MMHG | TEMPERATURE: 96.9 F | RESPIRATION RATE: 16 BRPM | BODY MASS INDEX: 27.77 KG/M2

## 2021-03-08 DIAGNOSIS — T14.8XXA SLIVER: ICD-10-CM

## 2021-03-08 DIAGNOSIS — L84 CALLUS OF FOOT: ICD-10-CM

## 2021-03-08 DIAGNOSIS — B36.0 PITYRIASIS VERSICOLOR: Primary | ICD-10-CM

## 2021-03-08 PROCEDURE — 99214 OFFICE O/P EST MOD 30 MIN: CPT | Performed by: NURSE PRACTITIONER

## 2021-03-08 RX ORDER — FLUCONAZOLE 150 MG/1
TABLET ORAL
Qty: 4 TABLET | Refills: 0 | Status: SHIPPED | OUTPATIENT
Start: 2021-03-08 | End: 2021-05-17

## 2021-03-08 ASSESSMENT — PAIN SCALES - GENERAL: PAINLEVEL: NO PAIN (0)

## 2021-03-08 ASSESSMENT — MIFFLIN-ST. JEOR: SCORE: 1827.87

## 2021-03-08 NOTE — LETTER
My Asthma Action Plan    Name: Ihsan Marcus   YOB: 1970  Date: 3/8/2021   My doctor: TIERRA Dickens CNP   My clinic: [facility]        My Rescue Medicine:   Albuterol inhaler (Proair/Ventolin/Proventil HFA)  2-4 puffs EVERY 4 HOURS as needed. Use a spacer if recommended by your provider.   My Asthma Severity:   Intermittent / Exercise Induced  Know your asthma triggers: upper respiratory infections, pollens and cold air             GREEN ZONE   Good Control    I feel good    No cough or wheeze    Can work, sleep and play without asthma symptoms       Take your asthma control medicine every day.     1. If exercise triggers your asthma, take your rescue medication    15 minutes before exercise or sports, and    During exercise if you have asthma symptoms  2. Spacer to use with inhaler: If you have a spacer, make sure to use it with your inhaler             YELLOW ZONE Getting Worse  I have ANY of these:    I do not feel good    Cough or wheeze    Chest feels tight    Wake up at night   1. Keep taking your Green Zone medications  2. Start taking your rescue medicine:    every 20 minutes for up to 1 hour. Then every 4 hours for 24-48 hours.  3. If you stay in the Yellow Zone for more than 12-24 hours, contact your doctor.  4. If you do not return to the Green Zone in 12-24 hours or you get worse, start taking your oral steroid medicine if prescribed by your provider.           RED ZONE Medical Alert - Get Help  I have ANY of these:    I feel awful    Medicine is not helping    Breathing getting harder    Trouble walking or talking    Nose opens wide to breathe       1. Take your rescue medicine NOW  2. If your provider has prescribed an oral steroid medicine, start taking it NOW  3. Call your doctor NOW  4. If you are still in the Red Zone after 20 minutes and you have not reached your doctor:    Take your rescue medicine again and    Call 911 or go to the emergency  room right away    See your regular doctor within 2 weeks of an Emergency Room or Urgent Care visit for follow-up treatment.          Annual Reminders:  Meet with Asthma Educator,  Flu Shot in the Fall, consider Pneumonia Vaccination for patients with asthma (aged 19 and older).    Pharmacy:    [facility] - [place], [place] - [address]    Electronically signed by TIERRA Dickens CNP   Date: 03/08/21                    Asthma Triggers  How To Control Things That Make Your Asthma Worse    Triggers are things that make your asthma worse.  Look at the list below to help you find your triggers and   what you can do about them. You can help prevent asthma flare-ups by staying away from your triggers.      Trigger                                                          What you can do   Cigarette Smoke  Tobacco smoke can make asthma worse. Do not allow smoking in your home, car or around you.  Be sure no one smokes at a child s day care or school.  If you smoke, ask your health care provider for ways to help you quit.  Ask family members to quit too.  Ask your health care provider for a referral to Quit Plan to help you quit smoking, or call 9-890-989-PLAN.     Colds, Flu, Bronchitis  These are common triggers of asthma. Wash your hands often.  Don t touch your eyes, nose or mouth.  Get a flu shot every year.     Dust Mites  These are tiny bugs that live in cloth or carpet. They are too small to see. Wash sheets and blankets in hot water every week.   Encase pillows and mattress in dust mite proof covers.  Avoid having carpet if you can. If you have carpet, vacuum weekly.   Use a dust mask and HEPA vacuum.   Pollen and Outdoor Mold  Some people are allergic to trees, grass, or weed pollen, or molds. Try to keep your windows closed.  Limit time out doors when pollen count is high.   Ask you health care provider about taking medicine during allergy  season.     Animal Dander  Some people are allergic to skin flakes, urine or saliva from pets with fur or feathers. Keep pets with fur or feathers out of your home.    If you can t keep the pet outdoors, then keep the pet out of your bedroom.  Keep the bedroom door closed.  Keep pets off cloth furniture and away from stuffed toys.     Mice, Rats, and Cockroaches  Some people are allergic to the waste from these pests.   Cover food and garbage.  Clean up spills and food crumbs.  Store grease in the refrigerator.   Keep food out of the bedroom.   Indoor Mold  This can be a trigger if your home has high moisture. Fix leaking faucets, pipes, or other sources of water.   Clean moldy surfaces.  Dehumidify basement if it is damp and smelly.   Smoke, Strong Odors, and Sprays  These can reduce air quality. Stay away from strong odors and sprays, such as perfume, powder, hair spray, paints, smoke incense, paint, cleaning products, candles and new carpet.   Exercise or Sports  Some people with asthma have this trigger. Be active!  Ask your doctor about taking medicine before sports or exercise to prevent symptoms.    Warm up for 5-10 minutes before and after sports or exercise.     Other Triggers of Asthma  Cold air:  Cover your nose and mouth with a scarf.  Sometimes laughing or crying can be a trigger.  Some medicines and food can trigger asthma.

## 2021-03-08 NOTE — PATIENT INSTRUCTIONS
Our Clinic hours are:  Mondays    7:20 am - 7 pm  Tues -  Fri  7:20 am - 5 pm    Clinic Phone: [phone]    The clinic lab opens at 7:30 am Mon - Fri and appointments are required.    [facility]. [phone]  Monday  8 am - 7pm  Tues - Fri 8 am - 5:30 pm

## 2021-03-08 NOTE — PROGRESS NOTES
Assessment & Plan     1. 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  Will treat with fluconazole.  Reviewed risks and benefits of medication as well as potential side effects.  Patient is in agreement with this plan he has tolerated this medication well in the past.  - fluconazole (DIFLUCAN) 150 MG tablet; Take 2 tabs today then repeat again in 2 weeks to treat pityriasis versicolor  Dispense: 4 tablet; Refill: 0    2. Sliver  Likely sliver of sole on left foot.  Unable to extract in clinic today.  Callus was parred down.  Referral placed to podiatry for further evaluation.  - Orthopedic & Spine  Referral; Future    3. Callus of foot  Calluses present on feet.  Specific bothersome callus present on the second toe of both feet.  Par down.  Further evaluation by podiatry recommended.  - Orthopedic & Spine  Referral; Future                   Return in about 3 months (around 6/8/2021).    TIERRA Dickens CNP  [facility]    Funmilayo Champagne is a 51 year old who presents for the following health issues  accompanied by his self :    HPI         Chief Complaint   Patient presents with     RECHECK     abdominal discomfort - patient states he is having  NO abdominal discomfort .      Toe Pain     2 nd toe both feet has a callus that the patient would like to have looked at today      Derm Problem     rash on chest      Terms of toe pain in the bottom of his foot he is having increased pain from calluses believes he needs a new pair shoes.  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  He has pain in the left sole of his foot.  He has a purplish nodule present that is painful to palpation.  He reports that he is tried extracting of a small piece of metal out of his foot without luck.  He would like me to try today.    Rash  Onset/Duration: on and off x 2 years - worse x 1 mo   Description  Location:  chest   Character: blotchy  Itching: no  Intensity:  mild  Progression of Symptoms:  worsening  Accompanying signs and symptoms:   Fever: no  Body aches or joint pain: no  Sore throat symptoms: no  Recent cold symptoms: no  History:           Previous episodes of similar rash: yes   New exposures:  None  Recent travel: no  Exposure to similar rash: YES  Precipitating or alleviating factors: patient is not sure what he was given for this in the past   Therapies tried and outcome: none    Rash has come and gone.  In review of chart it appears he has been prescribed fluconazole 2 tabs taken once and then repeated in 2 weeks.    Review of Systems   Constitutional, HEENT, cardiovascular, pulmonary, gi and gu systems are negative, except as otherwise noted.      Objective    /60 (BP Location: Right arm, Patient Position: Chair, Cuff Size: Adult Large)   Pulse 64   Temp 96.9  F (36.1  C) (Tympanic)   Resp 16   Ht 1.829 m (6')   Wt 93 kg (205 lb)   SpO2 98%   BMI 27.80 kg/m    Body mass index is 27.8 kg/m .  Physical Exam   GENERAL: healthy, alert and no distress  SKIN: chest wall: Macular scattered rash darker than skin tone present on patient's chest radiating towards abdomen and upper back consistent with pityriasis versicolor.  Feet: Bilateral second toes distal aspect with firm calluses present appears to be from rubbing on shoes.  Left sole of the foot dark purplish nodule present on ball of foot that is firm and tender to touch.    Procedure: Calluses parred down with a #10 scalpel.  Skin was cleansed prior to paring down calluses with alcohol prep wipe.  Patient tolerated powering down callus as well.  Sole of the foot prepped with alcohol prep wipe.  Attempted to par down callus and remove the sliver unable to visualize sliver.  Callus was pared down with some improvement of symptoms.  Patient tolerated procedure well and is without complaint.

## 2021-03-09 ASSESSMENT — ASTHMA QUESTIONNAIRES: ACT_TOTALSCORE: 25

## 2021-03-11 ENCOUNTER — ANCILLARY PROCEDURE (OUTPATIENT)
Dept: GENERAL RADIOLOGY | Facility: CLINIC | Age: 51
End: 2021-03-11
Attending: PODIATRIST
Payer: MEDICARE

## 2021-03-11 ENCOUNTER — OFFICE VISIT (OUTPATIENT)
Dept: PODIATRY | Facility: CLINIC | Age: 51
End: 2021-03-11
Payer: MEDICARE

## 2021-03-11 VITALS
HEIGHT: 72 IN | SYSTOLIC BLOOD PRESSURE: 130 MMHG | DIASTOLIC BLOOD PRESSURE: 82 MMHG | BODY MASS INDEX: 27.9 KG/M2 | WEIGHT: 206 LBS

## 2021-03-11 DIAGNOSIS — L84 CALLUS OF FOOT: Primary | ICD-10-CM

## 2021-03-11 DIAGNOSIS — L84 CALLUS OF FOOT: ICD-10-CM

## 2021-03-11 DIAGNOSIS — T14.8XXA SLIVER: ICD-10-CM

## 2021-03-11 PROCEDURE — 11055 PARING/CUTG B9 HYPRKER LES 1: CPT | Performed by: PODIATRIST

## 2021-03-11 PROCEDURE — 73630 X-RAY EXAM OF FOOT: CPT | Mod: LT | Performed by: RADIOLOGY

## 2021-03-11 PROCEDURE — 99203 OFFICE O/P NEW LOW 30 MIN: CPT | Mod: 25 | Performed by: PODIATRIST

## 2021-03-11 ASSESSMENT — MIFFLIN-ST. JEOR: SCORE: 1832.41

## 2021-03-11 NOTE — PATIENT INSTRUCTIONS
Next Steps:    1.  Soak the foot in warm Epsom's salt water daily  2.  Massage the area to see if any foreign body can work itself out of the entrance wound.  3.  Return to the office if problems persist.

## 2021-03-11 NOTE — PROGRESS NOTES
PATIENT HISTORY:  Ihsan Marcus is a 51 year old male who presents to clinic with a chief complaint of a painful lesion on the bottom of the left foot.  The patient relates that the problem has been going on for a year and is getting worse.  The patient relates possibly stepping on a carpet tack.  The patient relates trying soaking with little relief.   The patient was referred by Mayda Albarran CNP for consultation on the left foot.  Any previous notes and studies that pertain to the patient's condition were reviewed.    Pertinent medical, surgical and family history was reviewed in Epic chart and include gastroesophageal reflux disease, TIA, schizophrenia    Medications:   Current Outpatient Medications:      aspirin (ASA) 81 MG chewable tablet, Take 1 tablet (81 mg) by mouth daily, Disp: 90 tablet, Rfl: 3     famotidine (PEPCID) 20 MG tablet, Take 1 tablet (20 mg) by mouth 2 times daily as needed (for symptoms of reflux), Disp: 180 tablet, Rfl: 3     fluconazole (DIFLUCAN) 150 MG tablet, Take 2 tabs today then repeat again in 2 weeks to treat pityriasis versicolor, Disp: 4 tablet, Rfl: 0     melatonin 3 MG tablet, Take 6 mg by mouth, Disp: , Rfl:      nicotine (NICODERM CQ) 21 MG/24HR 24 hr patch, Place 1 patch onto the skin every 24 hours, Disp: 30 patch, Rfl: 0     OLANZapine (ZYPREXA) 15 MG tablet, Take 15 mg by mouth, Disp: , Rfl:      rosuvastatin (CRESTOR) 10 MG tablet, Take 1 tablet (10 mg) by mouth daily, Disp: 90 tablet, Rfl: 3     senna-docusate (SENOKOT-S/PERICOLACE) 8.6-50 MG tablet, Take 1 tablet by mouth daily as needed for constipation, Disp: 90 tablet, Rfl: 3     acetaminophen (TYLENOL) 500 MG tablet, Take 500 mg by mouth, Disp: , Rfl:      Allergies:    Allergies   Allergen Reactions     Fish Allergy      Fish Oil      Latex      Olive Oil        Vitals: /82 (BP Location: Left arm, Patient Position: Chair, Cuff Size: Adult Large)   Ht 1.829 m (6')   Wt 93.4 kg (206 lb)   BMI 27.94 kg/m     BMI= Body mass index is 27.94 kg/m .    LOWER EXTREMITY PHYSICAL EXAM    Dermatologic: Noted hyperkeratotic skin lesion located on the plantar aspect of the first metatarsophalangeal joint on the left foot.  No surrounding erythema noted.  No drainage noted.  Skin is intact to left lower extremities without significant lesions, rash or abrasion.        Vascular: DP & PT pulses are intact & regular on the left.   CFT and skin temperature is normal to the left lower extremities.     Neurologic: Lower extremity sensation is intact to light touch.  No evidence of weakness in the left lower extremities.        Musculoskeletal: Patient is ambulatory without assistive device or brace.  No gross ankle deformity noted.  No foot or ankle joint effusion is noted.       Diagnostics:  Radiographs were evaluated including weightbearing AP, lateral and medial oblique views of the left foot reveals   no cortical erosions or periosteal elevation.  All joint margins appear stable.  There is no apparent fracture or tumor formation noted.  There is no evidence of foreign body.  The images were reviewed with the patient explaining the findings.      ASSESSMENT / PLAN:     ICD-10-CM    1. Callus of foot  L84 Orthopedic & Spine  Referral     XR Foot Left G/E 3 Views       I have explained to Ihsan about the conditions.  We discussed the underlying contributing factors of the condition as well as both conservative and surgical treatment options along with expected length of recovery.  At this time, the hyperkeratotic skin lesion was sharply debrided down to healthy epidermis.  No visual evidence of foreign body was noted.  The patient was advised to continue with soaking now that the hyperkeratotic tissue was removed allowing for possible expulsion of any possible foreign body.  The patient was instructed to return to the office if problems persist.    Ihsan verbalized agreement with and understanding of the rational for the  diagnosis and treatment plan.  All questions were answered to best of my ability and the patient's satisfaction. The patient was advised to contact the clinic with any questions that may arise after the clinic visit.      Disclaimer: This note consists of symbols derived from keyboarding, dictation and/or voice recognition software. As a result, there may be errors in the script that have gone undetected. Please consider this when interpreting information found in this chart.       ESTEFANIA Mays D.P.M., [email or web address].

## 2021-03-11 NOTE — LETTER
3/11/2021         RE: Ihsan Marcus  [address] [place] [address]        Dear Colleague,    Thank you for referring your patient, Ihsan Marcus, to the [facility] [place]. Please see a copy of my visit note below.    PATIENT HISTORY:  Ihsan Marcus is a 51 year old male who presents to clinic with a chief complaint of a painful lesion on the bottom of the left foot.  The patient relates that the problem has been going on for a year and is getting worse.  The patient relates possibly stepping on a carpet tack.  The patient relates trying soaking with little relief.   The patient was referred by Mayda Albarran CNP for consultation on the left foot.  Any previous notes and studies that pertain to the patient's condition were reviewed.    Pertinent medical, surgical and family history was reviewed in Epic chart and include gastroesophageal reflux disease, TIA, schizophrenia    Medications:   Current Outpatient Medications:      aspirin (ASA) 81 MG chewable tablet, Take 1 tablet (81 mg) by mouth daily, Disp: 90 tablet, Rfl: 3     famotidine (PEPCID) 20 MG tablet, Take 1 tablet (20 mg) by mouth 2 times daily as needed (for symptoms of reflux), Disp: 180 tablet, Rfl: 3     fluconazole (DIFLUCAN) 150 MG tablet, Take 2 tabs today then repeat again in 2 weeks to treat pityriasis versicolor, Disp: 4 tablet, Rfl: 0     melatonin 3 MG tablet, Take 6 mg by mouth, Disp: , Rfl:      nicotine (NICODERM CQ) 21 MG/24HR 24 hr patch, Place 1 patch onto the skin every 24 hours, Disp: 30 patch, Rfl: 0     OLANZapine (ZYPREXA) 15 MG tablet, Take 15 mg by mouth, Disp: , Rfl:      rosuvastatin (CRESTOR) 10 MG tablet, Take 1 tablet (10 mg) by mouth daily, Disp: 90 tablet, Rfl: 3     senna-docusate (SENOKOT-S/PERICOLACE) 8.6-50 MG tablet, Take 1 tablet by mouth daily as needed for constipation, Disp: 90 tablet, Rfl: 3     acetaminophen (TYLENOL) 500 MG tablet, Take 500 mg by mouth, Disp: , Rfl:       Allergies:    Allergies   Allergen Reactions     Fish Allergy      Fish Oil      Latex      Olive Oil        Vitals: /82 (BP Location: Left arm, Patient Position: Chair, Cuff Size: Adult Large)   Ht 1.829 m (6')   Wt 93.4 kg (206 lb)   BMI 27.94 kg/m    BMI= Body mass index is 27.94 kg/m .    LOWER EXTREMITY PHYSICAL EXAM    Dermatologic: Noted hyperkeratotic skin lesion located on the plantar aspect of the first metatarsophalangeal joint on the left foot.  No surrounding erythema noted.  No drainage noted.  Skin is intact to left lower extremities without significant lesions, rash or abrasion.        Vascular: DP & PT pulses are intact & regular on the left.   CFT and skin temperature is normal to the left lower extremities.     Neurologic: Lower extremity sensation is intact to light touch.  No evidence of weakness in the left lower extremities.        Musculoskeletal: Patient is ambulatory without assistive device or brace.  No gross ankle deformity noted.  No foot or ankle joint effusion is noted.       Diagnostics:  Radiographs were evaluated including weightbearing AP, lateral and medial oblique views of the left foot reveals   no cortical erosions or periosteal elevation.  All joint margins appear stable.  There is no apparent fracture or tumor formation noted.  There is no evidence of foreign body.  The images were reviewed with the patient explaining the findings.      ASSESSMENT / PLAN:     ICD-10-CM    1. Callus of foot  L84 Orthopedic & Spine  Referral     XR Foot Left G/E 3 Views       I have explained to Ihsan about the conditions.  We discussed the underlying contributing factors of the condition as well as both conservative and surgical treatment options along with expected length of recovery.  At this time, the hyperkeratotic skin lesion was sharply debrided down to healthy epidermis.  No visual evidence of foreign body was noted.  The patient was advised to continue with soaking  now that the hyperkeratotic tissue was removed allowing for possible expulsion of any possible foreign body.  The patient was instructed to return to the office if problems persist.    Ihsan verbalized agreement with and understanding of the rational for the diagnosis and treatment plan.  All questions were answered to best of my ability and the patient's satisfaction. The patient was advised to contact the clinic with any questions that may arise after the clinic visit.      Disclaimer: This note consists of symbols derived from keyboarding, dictation and/or voice recognition software. As a result, there may be errors in the script that have gone undetected. Please consider this when interpreting information found in this chart.       ESTEFANIA Mays D.P.M., F.A.C.F.A.S.        Again, thank you for allowing me to participate in the care of your patient.        Sincerely,        Felton Mays DPM

## 2021-03-29 ENCOUNTER — OFFICE VISIT (OUTPATIENT)
Dept: PODIATRY | Facility: CLINIC | Age: 51
End: 2021-03-29
Payer: MEDICARE

## 2021-03-29 VITALS — BODY MASS INDEX: 27.9 KG/M2 | HEIGHT: 72 IN | WEIGHT: 206 LBS

## 2021-03-29 DIAGNOSIS — L84 CALLUS OF FOOT: ICD-10-CM

## 2021-03-29 DIAGNOSIS — M20.42 HAMMERTOE OF SECOND TOE OF LEFT FOOT: ICD-10-CM

## 2021-03-29 DIAGNOSIS — M20.41 HAMMERTOE OF SECOND TOE OF RIGHT FOOT: ICD-10-CM

## 2021-03-29 DIAGNOSIS — M54.16 LUMBAR BACK PAIN WITH RADICULOPATHY AFFECTING LOWER EXTREMITY: Primary | ICD-10-CM

## 2021-03-29 PROCEDURE — 11055 PARING/CUTG B9 HYPRKER LES 1: CPT | Performed by: PODIATRIST

## 2021-03-29 RX ORDER — CALCIUM CARBONATE 500 MG/1
1 TABLET, CHEWABLE ORAL 2 TIMES DAILY
COMMUNITY
End: 2021-09-10

## 2021-03-29 ASSESSMENT — MIFFLIN-ST. JEOR: SCORE: 1832.41

## 2021-03-29 NOTE — LETTER
3/29/2021         RE: Ihsan Marcus  [address]  [place] [address]        Dear Colleague,    Thank you for referring your patient, Ihsan Marcus, to the [facility] [place]. Please see a copy of my visit note below.    Ihsan returns to the office for reevaluation of the right and left foot.  The patient relates having painful calluses on the tips of the second toes on both feet. The patient also relates having a painful lower back.    Vitals: Ht 1.829 m (6')   Wt 93.4 kg (206 lb)   BMI 27.94 kg/m    BMI= Body mass index is 27.94 kg/m .    Lower Extremity Physical Exam:      Neurovascular status remains unchanged.  Muscular exam is within normal limits to major muscle groups.  Integument is intact.      Noted contracted hammertoe deformities of the second toes bilaterally. Noted hyperkeratotic skin lesions on the distal aspects of the second toes. No surrounding erythema or edema noted. No drainage noted.     Assessment:     ICD-10-CM    1. Lumbar back pain with radiculopathy affecting lower extremity  M54.16    2. Callus of foot  L84    3. Hammertoe of second toe of left foot  M20.42    4. Hammertoe of second toe of right foot  M20.41        Plan:    I have explained to Ihsan about the progress of the conditions.  At this time, the hyperkeratotic skin lesions were sharply debrided with a #15 blade down to normal healthy epidermis. No bleeding noted. The patient was advised to wear a silicone toe sleeve to better offload the pressure causing the callus formation. The patient will follow up with his primary care provider for further evaluation of the lower back pain.    Ihsan verbalized agreement with and understanding of the rational for the diagnosis and treatment plan.  All questions were answered to best of my ability and the patient's satisfaction. The patient was advised to contact the clinic with any questions that may arise after the clinic visit.      Disclaimer: This note  consists of symbols derived from keyboarding, dictation and/or voice recognition software. As a result, there may be errors in the script that have gone undetected. Please consider this when interpreting information found in this chart.       ESTEFANIA Mays D.P.M., F.A.C.F.A.S.        Again, thank you for allowing me to participate in the care of your patient.        Sincerely,        Felton Mays DPM

## 2021-03-29 NOTE — PROGRESS NOTES
Ihsan returns to the office for reevaluation of the right and left foot.  The patient relates having painful calluses on the tips of the second toes on both feet. The patient also relates having a painful lower back.    Vitals: Ht 1.829 m (6')   Wt 93.4 kg (206 lb)   BMI 27.94 kg/m    BMI= Body mass index is 27.94 kg/m .    Lower Extremity Physical Exam:      Neurovascular status remains unchanged.  Muscular exam is within normal limits to major muscle groups.  Integument is intact.      Noted contracted hammertoe deformities of the second toes bilaterally. Noted hyperkeratotic skin lesions on the distal aspects of the second toes. No surrounding erythema or edema noted. No drainage noted.     Assessment:     ICD-10-CM    1. Lumbar back pain with radiculopathy affecting lower extremity  M54.16    2. Callus of foot  L84    3. Hammertoe of second toe of left foot  M20.42    4. Hammertoe of second toe of right foot  M20.41        Plan:    I have explained to Ihsan about the progress of the conditions.  At this time, the hyperkeratotic skin lesions were sharply debrided with a #15 blade down to normal healthy epidermis. No bleeding noted. The patient was advised to wear a silicone toe sleeve to better offload the pressure causing the callus formation. The patient will follow up with his primary care provider for further evaluation of the lower back pain.    Ihsan verbalized agreement with and understanding of the rational for the diagnosis and treatment plan.  All questions were answered to best of my ability and the patient's satisfaction. The patient was advised to contact the clinic with any questions that may arise after the clinic visit.      Disclaimer: This note consists of symbols derived from keyboarding, dictation and/or voice recognition software. As a result, there may be errors in the script that have gone undetected. Please consider this when interpreting information found in this chart.       ESTEFANIA Pink  IVANA Mays, [email or web address].

## 2021-03-29 NOTE — NURSING NOTE
Chief Complaint   Patient presents with     Consult     Callus BL feet second toes, burning sensation BL feet in all toes       Initial Ht 1.829 m (6')   Wt 93.4 kg (206 lb)   BMI 27.94 kg/m   Estimated body mass index is 27.94 kg/m  as calculated from the following:    Height as of this encounter: 1.829 m (6').    Weight as of this encounter: 93.4 kg (206 lb).  Medications and allergies reviewed.      Shiela POND MA

## 2021-03-29 NOTE — PATIENT INSTRUCTIONS
Dr. Beatty's Gel Toe Cap  Size: Mini  Quantity: two boxes (4 caps)    [facility]  [address] [place] [id number]  Contact  Phone:  [phone]    [facility]  [address] [facility] [place] [address]  Contact  Phone:  [phone]    [facility]  [address] [facility] [place] [address]  Contact  Phone:  [phone]  SMOKING CESSATION  What's in cigarette smoke? - Cigarette smoke contains over 4,000 chemicals. Nicotine is one of the main ingredients which is an insecticide/herbicide. It is poisonous to our nervous system, increases blood clotting risk, and decreases the body's defenses to fight off infection. Another chemical is Carbon Monoxide is an asphyxiating gas that permanently binds to blood cells and blocks the transport of oxygen. This leads to tissue death and decreases your metabolism. Tar is a chemical that coats your lungs and trachea which impairs new oxygen coming in and carbon dioxide getting out of your body.   How does smoking impact surgery? - Smoking is particularly hazardous with regards to surgery. Surgery puts stress on the body and a smoker's body is already under strain from these chemicals. Putting the two together, especially for an elective surgery, could be a recipe for disaster. Smoking before and after surgery increases your risk of heart problems, slow wound healing, delayed bone healing, blood clots, wound infection and anesthesia complications.   What are the benefits of quitting? - In 20 minutes your blood pressure will drop back down to normal. In 8 hours the carbon monoxide (a toxic gas) levels in your blood stream will drop by half, and oxygen levels will return to normal. In 48 hours your chance of having a heart attack will have decreased. All nicotine will have left your body. Your sense of taste and smell will return to a normal level. In 72 hours your bronchial tubes will relax, and your  energy levels will increase. In 2 weeks your circulation will increase, and it will continue to improve for the next 10 weeks.    Recommendations for elective surgery - Ideally, patients should quit smoking 8 weeks before and at least 2 weeks after elective surgery in order to avoid complications. Simply cutting back on the amount of cigarettes smoked per day does not offer any benefit or decrease the risk of poor wound healing, heart problems, and infection. Smokers should also start taking Vitamin C and B for two weeks before surgery and two weeks after surgery.    Ways to Stop Smokin. Nicotine patches, lozenges, or gum  2. Support Groups  3. Medications (see below)    List of Medications:  1. Varenicline Tartrate (CHANTIX)   2. Bupropion HCL (WELLBUTRIN, ZYBAN) - note: make sure Wellbutrin is for smoking cessation and not other issues   3. Nicotine Patch (NICODERM)   4. Nicotine Inhaler (NICOTROL)   5. Nicotine Gum Nicotine Polacrilex   6. Nicotine Lozenge: Nicotine Polacrilex (COMMIT)   * [place] offers a smoking support group as well!  Please visit: [email or web address]  If you are interested in these, ask about getting a prescription or talk to your primary care doctor about what may be the best way for you to quit.

## 2021-04-12 DIAGNOSIS — R52 PAIN: Primary | ICD-10-CM

## 2021-04-12 RX ORDER — ACETAMINOPHEN 500 MG
500 TABLET ORAL EVERY 4 HOURS PRN
Qty: 180 TABLET | Refills: 1 | Status: ON HOLD | OUTPATIENT
Start: 2021-04-12 | End: 2022-05-06

## 2021-04-12 RX ORDER — ACETAMINOPHEN 500 MG
500 TABLET ORAL
Status: CANCELLED | OUTPATIENT
Start: 2021-04-12

## 2021-04-12 NOTE — TELEPHONE ENCOUNTER
Reason for Call:  Medication or medication refill:    Do you use a [facility]?  Name of the pharmacy and phone number for the current request:  [facility] - [place], [place] - [address] [place] [address]    Name of the medication requested: acetaminophen (TYLENOL) 500 MG tablet       Other request: refill request, will fax over as well    Can we leave a detailed message on this number? YES    Phone number patient can be reached at: Home number on file [phone] (home)    Best Time: any    Call taken on 4/12/2021 at 1:45 PM by Princess Vargas

## 2021-04-12 NOTE — TELEPHONE ENCOUNTER
acetaminophen (TYLENOL) 500 MG tablet  Last Written Prescription Date:  10/19/2020  Last Fill Quantity: na,  # refills: na   Last office visit: 3/8/2021 with prescribing provider:  raisa   Future Office Visit:

## 2021-04-12 NOTE — TELEPHONE ENCOUNTER
Routing refill request to provider for review/approval because:  Medication is reported/historical    Carmenza Lyon RN

## 2021-05-17 ENCOUNTER — OFFICE VISIT (OUTPATIENT)
Dept: FAMILY MEDICINE | Facility: CLINIC | Age: 51
End: 2021-05-17
Payer: MEDICARE

## 2021-05-17 VITALS
OXYGEN SATURATION: 98 % | HEART RATE: 64 BPM | HEIGHT: 73 IN | WEIGHT: 215 LBS | SYSTOLIC BLOOD PRESSURE: 122 MMHG | BODY MASS INDEX: 28.49 KG/M2 | RESPIRATION RATE: 16 BRPM | TEMPERATURE: 95.7 F | DIASTOLIC BLOOD PRESSURE: 72 MMHG

## 2021-05-17 DIAGNOSIS — L84 CALLUS OF FOOT: ICD-10-CM

## 2021-05-17 DIAGNOSIS — B36.0 PITYRIASIS VERSICOLOR: Primary | ICD-10-CM

## 2021-05-17 PROCEDURE — 99214 OFFICE O/P EST MOD 30 MIN: CPT | Performed by: NURSE PRACTITIONER

## 2021-05-17 RX ORDER — FLUCONAZOLE 150 MG/1
TABLET ORAL
Qty: 4 TABLET | Refills: 1 | Status: SHIPPED | OUTPATIENT
Start: 2021-05-17 | End: 2022-04-11

## 2021-05-17 ASSESSMENT — MIFFLIN-ST. JEOR: SCORE: 1889.11

## 2021-05-17 NOTE — PROGRESS NOTES
Assessment & Plan     Pityriasis versicolor  Tinea versicolor present on chest/abdomen, did resolve with fluconazole previously. Will treat again with fluconazole and selsun blue shampoo as body wash  - fluconazole (DIFLUCAN) 150 MG tablet; Take 2 tabs today then repeat again in 2 weeks to treat pityriasis versicolor    Callous of foot  Calloused tips of bilateral 2nd toes-more than likely caused from too tight of footwear. Pared calluses down utilizing a #15 scalpal, patient tolerated well and expressed pressure relief. Advised to purchase 0.5 size bigger of shoes if they feet tight to prevent calluses from reoccurring. May follow up as needed.  He has been seen by podiatry for these as well.       Review of prior external note(s) from - Podiatry  30 minutes spent on the date of the encounter doing chart review, history and exam, documentation and further activities per the note           Return in about 4 weeks (around 6/14/2021) for ongoing symptoms if not improving.    TIERRA Dickens CNP  [facility]    Funmilayo Champagne is a 51 year old who presents for the following health issues  accompanied by his self :    HPI       Chief Complaint   Patient presents with     Derm Problem     recheck rash on chest      Toe Pain     callus on both 2nd toes patient would like to have looked at today        Rash  Onset/Duration: about 2-3 weeks   Description  Location: Chest   Character: raised, diffuse  Itching: mild  Intensity:  moderate  Progression of Symptoms:  same  Accompanying signs and symptoms:   Fever: no  Body aches or joint pain: no  Sore throat symptoms: no  Recent cold symptoms: no  History:           Previous episodes of similar rash:   The was was treat and did get better and came right back about 1 week ago, some itching present  New exposures:  None  Recent travel: no  Exposure to similar rash: YES  Precipitating or alleviating factors: fluconazole (DIFLUCAN) 150 MG  "tablet; Take 2 tabs today then repeat again in 2 weeks to treat pityriasis versicolor    Therapies tried and outcome: see above       Patient also has calloused areas on bilateral 2nd tips of toes and bottom of the left foot. Pain with ambulating. Denies itching or burning. He has been to podiatry for these concerns. He was given a silicone toe sleeve. He threw this away after it got dirty.   He has had the calloses \"removed\" before. He believes they are warts as well.     See Podiatry note 3/11/21.    Review of Systems   Constitutional, HEENT, cardiovascular, pulmonary, gi and gu systems are negative, except as otherwise noted.      Objective    There were no vitals taken for this visit.  There is no height or weight on file to calculate BMI.     Physical Exam   GENERAL: healthy, alert and no distress  RESP: lungs clear to auscultation - course rhonchi and expiratory wheezing heard in left upper and lower lobes, clears with coughing. Right lung no rales, rhonchi, or wheezes.  CV: regular rate and rhythm, normal S1 S2, no S3 or S4, no murmur, click or rub, no peripheral edema and peripheral pulses strong  MS: no gross musculoskeletal defects noted, no edema  SKIN: Macular diffuse scattered rash darker than skin tone present on patient's chest radiating towards abdomen and upper back consistent with pityriasis versicolor.  calluses present on tips of bilateral toes, and on dorsum of left foot.     Procedure: Calluses parred down with a #10 scalpel.  Skin was cleansed prior to paring down calluses with alcohol prep wipe.  Patient tolerated paring down callus as well.  Sole of the foot prepped with alcohol prep wipe.  Attempted to par down callus   Callus was pared down with some improvement of symptoms.  Patient tolerated procedure well and is without complaint.          "

## 2021-05-17 NOTE — PATIENT INSTRUCTIONS
Take oral medication as prescribed. If not clear after 2nd treatment (after 1 week) then repeat. Refill provided.  Start washing with Selsum Blue shampoo (as a body wash) daily. Lather and allow to sit on skin for about 10 min then rinse. Repeat until lesions improve on a daily basis. May do this 1-2 x per week as a preventative.     For warts on toes and foot, liquid nitrogen applied today. May start using compound W for ongoing management. Follow up if no improvement in about 1 months.     Our Clinic hours are:  Mondays    7:20 am - 7 pm  Tues -  Fri  7:20 am - 5 pm    Clinic Phone: [phone]    The clinic lab opens at 7:30 am Mon - Fri and appointments are required.    [facility]. [phone]  Monday  8 am - 7pm  Tues - Fri 8 am - 5:30 pm       Patient Education     Tinea Versicolor  Tinea versicolor is a rash caused by a fungus in the top layers of the skin. This fungus is normally present in the pores of the skin and causes no symptoms. But when the fungus overgrows, it causes a rash. The fungus grows more easily in hot climates, and on oily or sweaty skin. Health experts don t know why some people get this rash and others don t. Experts also don t know why the rash will suddenly appear in someone who has never had it before.   The rash is made up of irregular pale or tan spots and patches. The rash is usually on the neck, upper back, chest, and shoulders. You may have mild itching, especially if you become overheated. But it doesn't cause other symptoms. Because these spots don't change color with sun exposure like normal skin, the rash may be lighter or darker than your normal skin.   This rash is harmless and usually causes no symptoms. The only reason for treatment is to improve appearance. Follow the suggestions below to clear the rash. It might take several months for normal skin color to return.   Home care    Use a special medicated shampoo over your whole body while in the  shower. Don t use soap. Let the shampoo stay on for about 10 minutes before rinsing off. Do this every day for one week.    As a different treatment, you may buy an antifungal cream (miconazole or clotrimazole, both available without a prescription). Use this daily for 2 weeks. .     This rash is not contagious to others. It can t be spread if someone touches it. So you don t have to worry about exposing others at school, , or work.    Your healthcare provider may also prescribe oral antifungal medicines to help stop the rash.  Prevention  This fungus can come back again (recur) after treatment. To prevent return of the rash, use medicated dandruff shampoo over your whole body when in the shower. Do this once a month for the next year. This is very important to do in the summertime. That is when the rash is most likely to recur.   Other prevention tips include:    Don't use oily skin products    Wear loose clothing. Try to let your skin stay cool and breathe.    Use sunscreen and protect yourself from sunlight    Don't use tanning beds  Follow-up care  Follow up with your healthcare provider, or as advised. Call your provider if the rash doesn t get better with the above treatment, or if new symptoms appear.   When to seek medical advice  Call your healthcare provider right away if any of these occur:    Increasing redness of the rash    Change in appearance of the rash    Fever of 100.4 F (38 C) or higher, or as directed by your provider  [place] last reviewed this educational content on 8/1/2019 2000-2021 [employer]. All rights reserved. This information is not intended as a substitute for professional medical care. Always follow your healthcare professional's instructions.

## 2021-05-26 ENCOUNTER — RECORDS - HEALTHEAST (OUTPATIENT)
Dept: ADMINISTRATIVE | Facility: CLINIC | Age: 51
End: 2021-05-26

## 2021-05-29 ENCOUNTER — RECORDS - HEALTHEAST (OUTPATIENT)
Dept: ADMINISTRATIVE | Facility: CLINIC | Age: 51
End: 2021-05-29

## 2021-06-10 ENCOUNTER — OFFICE VISIT (OUTPATIENT)
Dept: FAMILY MEDICINE | Facility: CLINIC | Age: 51
End: 2021-06-10
Payer: MEDICARE

## 2021-06-10 VITALS
RESPIRATION RATE: 14 BRPM | HEART RATE: 72 BPM | TEMPERATURE: 97.7 F | SYSTOLIC BLOOD PRESSURE: 100 MMHG | DIASTOLIC BLOOD PRESSURE: 62 MMHG | HEIGHT: 73 IN | BODY MASS INDEX: 26.64 KG/M2 | OXYGEN SATURATION: 97 % | WEIGHT: 201 LBS

## 2021-06-10 DIAGNOSIS — K59.00 CONSTIPATION, UNSPECIFIED CONSTIPATION TYPE: ICD-10-CM

## 2021-06-10 DIAGNOSIS — Z00.00 ENCOUNTER FOR MEDICARE ANNUAL WELLNESS EXAM: Primary | ICD-10-CM

## 2021-06-10 DIAGNOSIS — R41.83 BORDERLINE INTELLECTUAL FUNCTIONING: ICD-10-CM

## 2021-06-10 DIAGNOSIS — F30.9 BIPOLAR I DISORDER, SINGLE MANIC EPISODE (H): ICD-10-CM

## 2021-06-10 DIAGNOSIS — F20.9 SCHIZOPHRENIA, UNSPECIFIED TYPE (H): ICD-10-CM

## 2021-06-10 DIAGNOSIS — R05.9 COUGH: ICD-10-CM

## 2021-06-10 DIAGNOSIS — J30.1 SEASONAL ALLERGIC RHINITIS DUE TO POLLEN: ICD-10-CM

## 2021-06-10 DIAGNOSIS — Z12.11 COLON CANCER SCREENING: ICD-10-CM

## 2021-06-10 PROCEDURE — G0439 PPPS, SUBSEQ VISIT: HCPCS | Performed by: NURSE PRACTITIONER

## 2021-06-10 PROCEDURE — 99213 OFFICE O/P EST LOW 20 MIN: CPT | Mod: 25 | Performed by: NURSE PRACTITIONER

## 2021-06-10 RX ORDER — GUAIFENESIN 200 MG/10ML
200 LIQUID ORAL EVERY 4 HOURS PRN
Qty: 473 ML | Refills: 3 | Status: SHIPPED | OUTPATIENT
Start: 2021-06-10 | End: 2022-12-13

## 2021-06-10 RX ORDER — CALCIUM POLYCARBOPHIL 625 MG 625 MG/1
2 TABLET ORAL DAILY
Qty: 60 TABLET | Refills: 11 | Status: SHIPPED | OUTPATIENT
Start: 2021-06-10 | End: 2022-05-05

## 2021-06-10 RX ORDER — CETIRIZINE HYDROCHLORIDE 10 MG/1
10 TABLET ORAL DAILY
Qty: 30 TABLET | Refills: 11 | Status: SHIPPED | OUTPATIENT
Start: 2021-06-10 | End: 2022-05-05

## 2021-06-10 ASSESSMENT — ACTIVITIES OF DAILY LIVING (ADL): CURRENT_FUNCTION: NO ASSISTANCE NEEDED

## 2021-06-10 ASSESSMENT — PAIN SCALES - GENERAL: PAINLEVEL: NO PAIN (0)

## 2021-06-10 ASSESSMENT — MIFFLIN-ST. JEOR: SCORE: 1825.61

## 2021-06-10 NOTE — PROGRESS NOTES
"SUBJECTIVE:   Ihsan Marcus is a 51 year old male who presents for Preventive Visit.      Patient has been advised of split billing requirements and indicates understanding: Yes   Are you in the first 12 months of your Medicare coverage?  No    Healthy Habits:     In general, how would you rate your overall health?  Very good    Frequency of exercise:  2-3 days/week    Duration of exercise:  15-30 minutes    Do you usually eat at least 4 servings of fruit and vegetables a day, include whole grains    & fiber and avoid regularly eating high fat or \"junk\" foods?  No    Taking medications regularly:  Yes    Barriers to taking medications:  None    Medication side effects:  None    Ability to successfully perform activities of daily living:  No assistance needed    Home Safety:  No safety concerns identified    Hearing Impairment:  No hearing concerns    In the past 6 months, have you been bothered by leaking of urine?  No    In general, how would you rate your overall mental or emotional health?  Good      PHQ-2 Total Score: 0    Additional concerns today:  No    Do you feel safe in your environment? Sometimes    Have you ever done Advance Care Planning? (For example, a Health Directive, POLST, or a discussion with a medical provider or your loved ones about your wishes): No, advance care planning information given to patient to review.  Advanced care planning was discussed at today's visit.       Fall risk  Fallen 2 or more times in the past year?: No  Any fall with injury in the past year?: No    Cognitive Screening   1) Repeat 3 items (Leader, Season, Table)    2) Clock draw: NORMAL  3) 3 item recall: Recalls 2 objects   Results: NORMAL clock, 1-2 items recalled: COGNITIVE IMPAIRMENT LESS LIKELY    Mini-CogTM Copyright CHRIS[employer]. Licensed by the author for use in [facility]; reprinted with permission (andrew@.[facility]). All rights reserved.      Do you have sleep apnea, excessive snoring or daytime " drowsiness?: no    Reviewed and updated as needed this visit by clinical staff  Tobacco  Allergies  Meds   Med Hx  Surg Hx  Fam Hx  Soc Hx        Reviewed and updated as needed this visit by Provider                Social History     Tobacco Use     Smoking status: Current Every Day Smoker     Packs/day: 0.50     Years: 23.00     Pack years: 11.50     Types: Cigarettes     Smokeless tobacco: Never Used     Tobacco comment: Slowing down a lot   Substance Use Topics     Alcohol use: No     If you drink alcohol do you typically have >3 drinks per day or >7 drinks per week? No    Alcohol Use 6/10/2021   Prescreen: >3 drinks/day or >7 drinks/week? No       Lipid Panel 12/4/2020 completed through [facility]      She follows with psychiatry for management of schizophrenia, bipolar type I, delusional disorder, and his psychophysiologic insomnia.    Current providers sharing in care for this patient include:   Patient Care Team:  Mayda Albarran APRN CNP as PCP - General (Nurse Practitioner - Family)  Felton Mays DPM as Assigned Musculoskeletal Provider  Mayda Albarran APRN CNP as Assigned PCP    The following health maintenance items are reviewed in Epic and correct as of today:  Health Maintenance Due   Topic Date Due     ANNUAL REVIEW OF HM ORDERS  Never done     Pneumococcal Vaccine: Pediatrics (0 to 5 Years) and At-Risk Patients (6 to 64 Years) (1 of 2 - PPSV23) Never done     COLORECTAL CANCER SCREENING  Never done     COVID-19 Vaccine (1) Never done     LIPID  03/12/2019     ZOSTER IMMUNIZATION (1 of 2) 01/27/2021     Lab work is in process  Labs reviewed in EPIC  BP Readings from Last 3 Encounters:   06/10/21 100/62   05/17/21 122/72   03/11/21 130/82    Wt Readings from Last 3 Encounters:   06/10/21 91.2 kg (201 lb)   05/17/21 97.5 kg (215 lb)   03/29/21 93.4 kg (206 lb)                  Patient Active Problem List   Diagnosis     Schizophrenia (H)     Positive reaction to tuberculin skin test      Anorexia     Bipolar I disorder, single manic episode (H)     Borderline intellectual functioning     Candidiasis of esophagus (H)     Chest pain     Constipation     Delusional disorder (H)     Nicotine dependence     Drug induced subacute dyskinesia     Gastritis due to Helicobacter species     Gastroesophageal reflux disease without esophagitis     Hyperlipidemia     Left lower quadrant abdominal pain     Manic episode (H)     Mild persistent asthma     Nocturnal enuresis     Nondependent cannabis abuse     Pain in eye     Pityriasis versicolor     Polyp of colon     Psychophysiological insomnia     Rectal hemorrhage     Reflux esophagitis     Rhinitis, allergic     Schizoaffective disorder, bipolar type (H)     Transient ischemic attack     Urinary urgency     Verruca plantaris     History of Helicobacter pylori infection     History reviewed. No pertinent surgical history.    Social History     Tobacco Use     Smoking status: Current Every Day Smoker     Packs/day: 0.50     Years: 23.00     Pack years: 11.50     Types: Cigarettes     Smokeless tobacco: Never Used     Tobacco comment: Slowing down a lot   Substance Use Topics     Alcohol use: No     Family History   Problem Relation Age of Onset     Diabetes Mother      Coronary Artery Disease Mother      Coronary Artery Disease Father      Cancer No family hx of          Current Outpatient Medications   Medication Sig Dispense Refill     acetaminophen (TYLENOL) 500 MG tablet Take 1 tablet (500 mg) by mouth every 4 hours as needed for mild pain 180 tablet 1     aspirin (ASA) 81 MG chewable tablet Take 1 tablet (81 mg) by mouth daily 90 tablet 3     bismuth subsalicylate (PEPTO BISMOL) 262 MG/15ML suspension Take 30 mLs by mouth every 6 hours as needed for indigestion       calcium carbonate (TUMS) 500 MG chewable tablet Take 1 chew tab by mouth 2 times daily       calcium polycarbophil (FIBERCON) 625 MG tablet Take 2 tablets (1,250 mg) by mouth daily 60  tablet 11     cetirizine (ZYRTEC) 10 MG tablet Take 1 tablet (10 mg) by mouth daily 30 tablet 11     famotidine (PEPCID) 20 MG tablet Take 1 tablet (20 mg) by mouth 2 times daily as needed (for symptoms of reflux) 180 tablet 3     fluconazole (DIFLUCAN) 150 MG tablet Take 2 tabs today then repeat again in 2 weeks to treat pityriasis versicolor 4 tablet 1     guaiFENesin (ROBITUSSIN) 100 MG/5ML liquid Take 10 mLs (200 mg) by mouth every 4 hours as needed for cough 473 mL 3     melatonin 3 MG tablet Take 6 mg by mouth       nicotine (NICODERM CQ) 21 MG/24HR 24 hr patch Place 1 patch onto the skin every 24 hours 30 patch 0     OLANZapine (ZYPREXA) 15 MG tablet Take 15 mg by mouth       rosuvastatin (CRESTOR) 10 MG tablet Take 1 tablet (10 mg) by mouth daily 90 tablet 3     salicylic acid (COMPOUND W MAX STRENGTH) 17 % external gel Apply topically daily TO LESIONS ON FEET UNTIL CLEAR. 7 g 1     senna-docusate (SENOKOT-S/PERICOLACE) 8.6-50 MG tablet Take 1 tablet by mouth daily as needed for constipation 90 tablet 3     Allergies   Allergen Reactions     Fish Swelling and Other (See Comments)     Facial swelling and sleepiness     Latex      Other reaction(s): Other (see comments)     Mushroom Hives     Olive Oil Unknown and Other (See Comments)     Peanut (Diagnostic)      Other reaction(s): Edema     Penicillins      Other reaction(s): Other (see comments)     Trazodone      Other reaction(s): Other (see comments)     Fish Allergy      Fish Oil      Olive Oil      Recent Labs   Lab Test 05/06/15  1401 06/06/14  1121 03/12/14  1712   A1C  --   --  5.3   LDL  --   --  72.0   HDL  --   --  57.0   TRIG  --   --  94.0   CR 1.0 0.99 1.09   GFRESTIMATED 86.3 >60 > 60   GFRESTBLACK 104.4 >60 > 60   POTASSIUM 3.2* 4.3 4.3        Any new diagnosis of family breast, ovarian, or bowel cancer? No    FSH-7: No flowsheet data found.    Review of Systems  Constitutional, HEENT, cardiovascular, pulmonary, gi and gu systems are  "negative, except as otherwise noted.    OBJECTIVE:   /62 (BP Location: Right arm, Patient Position: Chair, Cuff Size: Adult Large)   Pulse 72   Temp 97.7  F (36.5  C) (Tympanic)   Resp 14   Ht 1.854 m (6' 1\")   Wt 91.2 kg (201 lb)   SpO2 97%   BMI 26.52 kg/m   Estimated body mass index is 26.52 kg/m  as calculated from the following:    Height as of this encounter: 1.854 m (6' 1\").    Weight as of this encounter: 91.2 kg (201 lb).     Physical Exam  GENERAL: healthy, alert and no distress  EYES: Eyes grossly normal to inspection, PERRL and conjunctivae and sclerae normal  HENT: ear canals and TM's normal, nose and mouth without ulcers or lesions  NECK: no adenopathy, no asymmetry, masses, or scars and thyroid normal to palpation  RESP: lungs clear to auscultation - no rales, rhonchi or wheezes  CV: regular rate and rhythm, normal S1 S2, no S3 or S4, no murmur, click or rub, no peripheral edema and peripheral pulses strong  ABDOMEN: soft, nontender, no hepatosplenomegaly, no masses and bowel sounds normal  MS: no gross musculoskeletal defects noted, no edema  SKIN: no suspicious lesions or rashes  NEURO: Normal strength and tone, mentation intact and speech normal  PSYCH: mentation appears normal, affect normal/bright        ASSESSMENT / PLAN:       ICD-10-CM    1. Encounter for Medicare annual wellness exam  Z00.00    2. Schizophrenia, unspecified type (H)  F20.9    3. Bipolar I disorder, single manic episode (H)  F30.9    4. Borderline intellectual functioning  R41.83    5. Constipation, unspecified constipation type  K59.00 calcium polycarbophil (FIBERCON) 625 MG tablet   6. Cough  R05 guaiFENesin (ROBITUSSIN) 100 MG/5ML liquid   7. Seasonal allergic rhinitis due to pollen  J30.1 cetirizine (ZYRTEC) 10 MG tablet   8. Colon cancer screening  Z12.11 GASTROENTEROLOGY ADULT REF PROCEDURE ONLY      Healthy Male exam completed today.  I discussed preventative measures with the patient including continuing " "with lifestyle modifications of a balanced diet and regular cardiovascular exercise.  I discussed self testicular exams and how to complete these.  I encouraged patient to follow-up yearly for annual preventative exams and sooner as needed.    He continues to follow with psych for management of his mental health.     Discussed utilizing FiberCon tablets for improvement of constipation.  For his cough he can utilize guaifenesin as needed.  Recommended cetirizine for his allergic rhinitis.  Discussed colonoscopy referral and he accepts this.    Patient has been advised of split billing requirements and indicates understanding: Yes  COUNSELING:  Reviewed preventive health counseling, as reflected in patient instructions       Regular exercise       Healthy diet/nutrition       Alcohol Use        Safe sex practices/STD prevention       Colon cancer screening    Estimated body mass index is 26.52 kg/m  as calculated from the following:    Height as of this encounter: 1.854 m (6' 1\").    Weight as of this encounter: 91.2 kg (201 lb).    Weight management plan: Discussed healthy diet and exercise guidelines    He reports that he has been smoking cigarettes. He has a 11.50 pack-year smoking history. He has never used smokeless tobacco.  Tobacco Cessation Action Plan:   Information offered: Patient not interested at this time      Appropriate preventive services were discussed with this patient, including applicable screening as appropriate for cardiovascular disease, diabetes, osteopenia/osteoporosis, and glaucoma.  As appropriate for age/gender, discussed screening for colorectal cancer, prostate cancer, breast cancer, and cervical cancer. Checklist reviewing preventive services available has been given to the patient.    Reviewed patients plan of care and provided an AVS. The Basic Care Plan (routine screening as documented in Health Maintenance) for Ihsan meets the Care Plan requirement. This Care Plan has been " established and reviewed with the Patient.    Counseling Resources:  ATP IV Guidelines  Pooled Cohorts Equation Calculator  Breast Cancer Risk Calculator  Breast Cancer: Medication to Reduce Risk  FRAX Risk Assessment  ICSI Preventive Guidelines  Dietary Guidelines for Americans, 2010  USDA's MyPlate  ASA Prophylaxis  Lung CA Screening    TIERRA Dickens CNP  [facility]    Identified Health Risks:

## 2021-06-10 NOTE — PATIENT INSTRUCTIONS
Patient Education   Personalized Prevention Plan  You are due for the preventive services outlined below.  Your care team is available to assist you in scheduling these services.  If you have already completed any of these items, please share that information with your care team to update in your medical record.  Health Maintenance Due   Topic Date Due     ANNUAL REVIEW OF HM ORDERS  Never done     Discuss Advance Care Planning  Never done     Pneumococcal Vaccine (1 of 2 - PPSV23) Never done     Colorectal Cancer Screening  Never done     COVID-19 Vaccine (1) Never done     Annual Wellness Visit  05/15/2015     Cholesterol Lab  03/12/2019     Zoster (Shingles) Vaccine (1 of 2) 01/27/2021

## 2021-07-19 DIAGNOSIS — Z11.59 ENCOUNTER FOR SCREENING FOR OTHER VIRAL DISEASES: ICD-10-CM

## 2021-07-23 ENCOUNTER — TELEPHONE (OUTPATIENT)
Dept: FAMILY MEDICINE | Facility: CLINIC | Age: 51
End: 2021-07-23

## 2021-07-23 ENCOUNTER — OFFICE VISIT (OUTPATIENT)
Dept: FAMILY MEDICINE | Facility: CLINIC | Age: 51
End: 2021-07-23
Payer: MEDICARE

## 2021-07-23 ENCOUNTER — ANCILLARY PROCEDURE (OUTPATIENT)
Dept: GENERAL RADIOLOGY | Facility: CLINIC | Age: 51
End: 2021-07-23
Attending: NURSE PRACTITIONER
Payer: MEDICARE

## 2021-07-23 VITALS
HEART RATE: 71 BPM | DIASTOLIC BLOOD PRESSURE: 73 MMHG | SYSTOLIC BLOOD PRESSURE: 117 MMHG | RESPIRATION RATE: 14 BRPM | WEIGHT: 202 LBS | BODY MASS INDEX: 26.77 KG/M2 | TEMPERATURE: 98.1 F | OXYGEN SATURATION: 95 % | HEIGHT: 73 IN

## 2021-07-23 DIAGNOSIS — L84 CALLUS OF FOOT: ICD-10-CM

## 2021-07-23 DIAGNOSIS — B07.9 VIRAL WARTS, UNSPECIFIED TYPE: ICD-10-CM

## 2021-07-23 DIAGNOSIS — R05.9 COUGH: Primary | ICD-10-CM

## 2021-07-23 DIAGNOSIS — R05.9 COUGH: ICD-10-CM

## 2021-07-23 DIAGNOSIS — B36.0 PITYRIASIS VERSICOLOR: ICD-10-CM

## 2021-07-23 DIAGNOSIS — J18.9 PNEUMONIA OF LEFT LOWER LOBE DUE TO INFECTIOUS ORGANISM: Primary | ICD-10-CM

## 2021-07-23 PROCEDURE — 99214 OFFICE O/P EST MOD 30 MIN: CPT | Performed by: NURSE PRACTITIONER

## 2021-07-23 PROCEDURE — 71046 X-RAY EXAM CHEST 2 VIEWS: CPT | Mod: FY | Performed by: RADIOLOGY

## 2021-07-23 RX ORDER — AZITHROMYCIN 250 MG/1
TABLET, FILM COATED ORAL
Qty: 6 TABLET | Refills: 0 | Status: SHIPPED | OUTPATIENT
Start: 2021-07-23 | End: 2021-07-28

## 2021-07-23 ASSESSMENT — MIFFLIN-ST. JEOR: SCORE: 1830.15

## 2021-07-23 ASSESSMENT — PAIN SCALES - GENERAL: PAINLEVEL: NO PAIN (0)

## 2021-07-23 NOTE — PROGRESS NOTES
Assessment & Plan     Cough  Chest x-ray clear today.  No acute infectious process noted.  Patient reassured.  - XR Chest 2 Views; Future    Callus of foot  Callus on foot has been treated numerous times with debridement.  Dermatology referral placed today.  Has been seen by podiatry for this in the past as well.  - Adult Dermatology Referral; Future    Viral warts, unspecified type  Appears to be viral wart.  Dermatology referral to for further evaluation.  - Adult Dermatology Referral; Future    Pityriasis versicolor  Selsun Blue encouraged.  Does not appear to need fluconazole today.  Would consider retreatment with this again in the future if needed.  - Adult Dermatology Referral; Future                 Return in about 3 months (around 10/23/2021) for or sooner if symptoms fail to improve.    TIERRA Dickens CNP  [facility]   Ihsan is a 51 year old who presents for the following health issues     HPI     Medication Followup of Crestor, fluconazole     Taking Medication as prescribed: yes    Side Effects:  None    Medication Helping Symptoms:  yes   Concerned that he has tinea versicolor again.  He is wondering if he needs to be on another dose of fluconazole.  He is also wondering if the Crestor is causing heartburn.  He has not been requesting his famotidine on a regular basis.  He has not been taking his antacids.    Acute Illness  Acute illness concerns: wheezing, cough up phlem  Onset/Duration: 6/10/21  Symptoms:  Fever: no  Chills/Sweats: no  Headache (location?): YES  Sinus Pressure: no  Conjunctivitis:  no  Ear Pain: no  Rhinorrhea: yes  Congestion: YES  Sore Throat: no  Cough: YES-productive of green sputum, with shortness of breath  Wheeze: YES  Decreased Appetite: YES  Nausea: no  Vomiting: no  Diarrhea: no  Dysuria/Freq.: no  Dysuria or Hematuria: no  Fatigue/Achiness: no  Sick/Strep Exposure: no  Therapies tried and outcome: zyrtec, robitussin   "    He has concerns of mold from his living environment. He mentions that people in his home are getting sick and someone else tested positive for mold.     He would also like to evaluate his toes and the callouses that are present.         Review of Systems   Constitutional, HEENT, cardiovascular, pulmonary, gi and gu systems are negative, except as otherwise noted.      Objective    /73 (BP Location: Right arm, Patient Position: Chair, Cuff Size: Adult Large)   Pulse 71   Temp 98.1  F (36.7  C) (Tympanic)   Resp 14   Ht 1.854 m (6' 1\")   Wt 91.6 kg (202 lb)   SpO2 95%   BMI 26.65 kg/m    Body mass index is 26.65 kg/m .  Physical Exam   GENERAL: healthy, alert and no distress  NECK: no adenopathy, no asymmetry, masses, or scars and thyroid normal to palpation  RESP: lungs clear to auscultation - no rales, rhonchi or wheezes  CV: regular rate and rhythm, normal S1 S2, no S3 or S4, no murmur, click or rub, no peripheral edema and peripheral pulses strong  ABDOMEN: soft, nontender, no hepatosplenomegaly, no masses and bowel sounds normal  MS: no gross musculoskeletal defects noted, no edema  Skin: Callus/wart like lesions present on second toes bilaterally.  Right appears worse than left with more of a tag-like appearing lesion left toe has more ulcer appearing lesion.  Both are callus versus wart.  Bottom of left foot continues to have the lesion present where he believes a foreign object is present.  This has been par down previously without improvement.  Did not par lesion today.              "

## 2021-07-23 NOTE — TELEPHONE ENCOUNTER
Will start oral antibiotic due to x-ray showing left hazy opacity in lower base.  Patient notified of results and accepts antibiotic.  Sent in for patient.    TIERRA Dickens CNP

## 2021-07-23 NOTE — H&P (VIEW-ONLY)
Assessment & Plan     Cough  Chest x-ray clear today.  No acute infectious process noted.  Patient reassured.  - XR Chest 2 Views; Future    Callus of foot  Callus on foot has been treated numerous times with debridement.  Dermatology referral placed today.  Has been seen by podiatry for this in the past as well.  - Adult Dermatology Referral; Future    Viral warts, unspecified type  Appears to be viral wart.  Dermatology referral to for further evaluation.  - Adult Dermatology Referral; Future    Pityriasis versicolor  Selsun Blue encouraged.  Does not appear to need fluconazole today.  Would consider retreatment with this again in the future if needed.  - Adult Dermatology Referral; Future                 Return in about 3 months (around 10/23/2021) for or sooner if symptoms fail to improve.    TIERRA Dickens CNP  [facility]   Ihsan is a 51 year old who presents for the following health issues     HPI     Medication Followup of Crestor, fluconazole     Taking Medication as prescribed: yes    Side Effects:  None    Medication Helping Symptoms:  yes   Concerned that he has tinea versicolor again.  He is wondering if he needs to be on another dose of fluconazole.  He is also wondering if the Crestor is causing heartburn.  He has not been requesting his famotidine on a regular basis.  He has not been taking his antacids.    Acute Illness  Acute illness concerns: wheezing, cough up phlem  Onset/Duration: 6/10/21  Symptoms:  Fever: no  Chills/Sweats: no  Headache (location?): YES  Sinus Pressure: no  Conjunctivitis:  no  Ear Pain: no  Rhinorrhea: yes  Congestion: YES  Sore Throat: no  Cough: YES-productive of green sputum, with shortness of breath  Wheeze: YES  Decreased Appetite: YES  Nausea: no  Vomiting: no  Diarrhea: no  Dysuria/Freq.: no  Dysuria or Hematuria: no  Fatigue/Achiness: no  Sick/Strep Exposure: no  Therapies tried and outcome: zyrtec, robitussin   "    He has concerns of mold from his living environment. He mentions that people in his home are getting sick and someone else tested positive for mold.     He would also like to evaluate his toes and the callouses that are present.         Review of Systems   Constitutional, HEENT, cardiovascular, pulmonary, gi and gu systems are negative, except as otherwise noted.      Objective    /73 (BP Location: Right arm, Patient Position: Chair, Cuff Size: Adult Large)   Pulse 71   Temp 98.1  F (36.7  C) (Tympanic)   Resp 14   Ht 1.854 m (6' 1\")   Wt 91.6 kg (202 lb)   SpO2 95%   BMI 26.65 kg/m    Body mass index is 26.65 kg/m .  Physical Exam   GENERAL: healthy, alert and no distress  NECK: no adenopathy, no asymmetry, masses, or scars and thyroid normal to palpation  RESP: lungs clear to auscultation - no rales, rhonchi or wheezes  CV: regular rate and rhythm, normal S1 S2, no S3 or S4, no murmur, click or rub, no peripheral edema and peripheral pulses strong  ABDOMEN: soft, nontender, no hepatosplenomegaly, no masses and bowel sounds normal  MS: no gross musculoskeletal defects noted, no edema  Skin: Callus/wart like lesions present on second toes bilaterally.  Right appears worse than left with more of a tag-like appearing lesion left toe has more ulcer appearing lesion.  Both are callus versus wart.  Bottom of left foot continues to have the lesion present where he believes a foreign object is present.  This has been par down previously without improvement.  Did not par lesion today.              "

## 2021-07-23 NOTE — PATIENT INSTRUCTIONS
1. Cough  Cough- No signs of infection. I will let you know if anything different is seen by the radiologist.     - XR Chest 2 Views; Future    2. Callus of foot  3. Viral warts, unspecified type  Dermatology referral placed for further evaluation and recommendation for management.     4. Pityriasis versicolor  Continue with selsum blue as previously prescribed for treatment. Does not need oral medication today but if worsening would consider.       Follow up with psychiatry as scheduled.

## 2021-08-12 ENCOUNTER — ANESTHESIA EVENT (OUTPATIENT)
Dept: GASTROENTEROLOGY | Facility: CLINIC | Age: 51
End: 2021-08-12
Payer: MEDICARE

## 2021-08-12 ASSESSMENT — LIFESTYLE VARIABLES: TOBACCO_USE: 1

## 2021-08-12 NOTE — ANESTHESIA PREPROCEDURE EVALUATION
Anesthesia Pre-Procedure Evaluation    Patient: Ihsan Marcus   MRN: [id number] : 1970        Preoperative Diagnosis: Colon cancer screening [Z12.11]   Procedure : Procedure(s):  COLONOSCOPY     Past Medical History:   Diagnosis Date     Mild persistent asthma 2018      History reviewed. No pertinent surgical history.   Allergies   Allergen Reactions     Fish Swelling and Other (See Comments)     Facial swelling and sleepiness     Latex      Other reaction(s): Other (see comments)     Mushroom Hives     Olive Oil Unknown and Other (See Comments)     Peanut (Diagnostic)      Other reaction(s): Edema     Penicillins      Other reaction(s): Other (see comments)     Trazodone      Other reaction(s): Other (see comments)     Fish Allergy      Fish Oil      Olive Oil       Social History     Tobacco Use     Smoking status: Current Every Day Smoker     Packs/day: 0.50     Years: 23.00     Pack years: 11.50     Types: Cigarettes     Smokeless tobacco: Never Used     Tobacco comment: Slowing down a lot   Substance Use Topics     Alcohol use: No      Wt Readings from Last 1 Encounters:   21 91.6 kg (202 lb)        Anesthesia Evaluation   Pt has had prior anesthetic.         ROS/MED HX  ENT/Pulmonary:     (+) allergic rhinitis, tobacco use (& cannibis use), Current use, 12  Pack-Year Hx,  Mild Persistent, asthma     Neurologic:       Cardiovascular:     (+) Dyslipidemia -----    METS/Exercise Tolerance:     Hematologic:  - neg hematologic  ROS     Musculoskeletal:  - neg musculoskeletal ROS     GI/Hepatic:     (+) GERD,     Renal/Genitourinary:  - neg Renal ROS     Endo:  - neg endo ROS     Psychiatric/Substance Use: Comment: Delusional disorder    (+) psychiatric history schizophrenia, bipolar and other (comment)     Infectious Disease: Comment: H Pylori Infection history  - neg infectious disease ROS     Malignancy:  - neg malignancy ROS     Other:            Physical Exam    Airway        Mallampati: II    TM distance: > 3 FB   Neck ROM: full   Mouth opening: > 3 cm    Respiratory Devices and Support         Dental  no notable dental history         Cardiovascular   cardiovascular exam normal          Pulmonary   pulmonary exam normal                OUTSIDE LABS:  CBC:   Lab Results   Component Value Date    WBC 4.9 12/07/2007    WBC 8.5 06/26/2007    HGB 13.5 (L) 06/06/2014    HGB 13.0 (L) 03/12/2014    HCT 43.0 06/06/2014    HCT 40.7 03/12/2014     12/07/2007     06/26/2007     BMP:   Lab Results   Component Value Date    .8 05/06/2015     06/06/2014    POTASSIUM 3.3 (L) 09/30/2020    POTASSIUM 3.2 (L) 05/06/2015    CHLORIDE 104.1 05/06/2015    CHLORIDE 105 06/06/2014    CO2 26.3 05/06/2015    CO2 28 12/07/2007    BUN 10.5 05/06/2015    BUN 13 06/06/2014    CR 1.03 09/30/2020    CR 1.0 05/06/2015     09/30/2020    GLC 92.3 05/06/2015     COAGS:   Lab Results   Component Value Date    INR 1 05/13/2019     POC: No results found for: BGM, HCG, HCGS  HEPATIC:   Lab Results   Component Value Date    ALBUMIN 3.7 03/12/2014    PROTTOTAL 6.6 03/12/2014    ALT 34 12/04/2020    AST 29 12/04/2020    GGT 13 12/07/2007    ALKPHOS 75 12/07/2007    BILITOTAL 0.3 03/12/2014     OTHER:   Lab Results   Component Value Date    A1C 6 (H) 12/04/2020    SUAD 9.2 05/06/2015    TSH 0.6 09/30/2020       Anesthesia Plan    ASA Status:  3      Anesthesia Type: General.              Consents    Anesthesia Plan(s) and associated risks, benefits, and realistic alternatives discussed. Questions answered and patient/representative(s) expressed understanding.     - Discussed with:  Patient         Postoperative Care       PONV prophylaxis: Ondansetron (or other 5HT-3), Dexamethasone or Solumedrol     Comments:                TIERRA Stoddard CRNA

## 2021-08-13 ENCOUNTER — HOSPITAL ENCOUNTER (OUTPATIENT)
Facility: CLINIC | Age: 51
Discharge: HOME | End: 2021-08-13
Attending: SURGERY | Admitting: SURGERY
Payer: MEDICARE

## 2021-08-13 ENCOUNTER — ANESTHESIA (OUTPATIENT)
Dept: GASTROENTEROLOGY | Facility: CLINIC | Age: 51
End: 2021-08-13
Payer: MEDICARE

## 2021-08-13 VITALS
HEIGHT: 73 IN | OXYGEN SATURATION: 33 % | HEART RATE: 57 BPM | TEMPERATURE: 98 F | RESPIRATION RATE: 16 BRPM | DIASTOLIC BLOOD PRESSURE: 98 MMHG | BODY MASS INDEX: 27.3 KG/M2 | WEIGHT: 206 LBS | SYSTOLIC BLOOD PRESSURE: 136 MMHG

## 2021-08-13 LAB — COLONOSCOPY: NORMAL

## 2021-08-13 PROCEDURE — G0105 COLORECTAL SCRN; HI RISK IND: HCPCS | Performed by: SURGERY

## 2021-08-13 PROCEDURE — 45378 DIAGNOSTIC COLONOSCOPY: CPT | Performed by: SURGERY

## 2021-08-13 PROCEDURE — 258N000003 HC RX IP 258 OP 636: Performed by: SURGERY

## 2021-08-13 PROCEDURE — 370N000017 HC ANESTHESIA TECHNICAL FEE, PER MIN: Performed by: SURGERY

## 2021-08-13 PROCEDURE — 250N000011 HC RX IP 250 OP 636: Performed by: NURSE ANESTHETIST, CERTIFIED REGISTERED

## 2021-08-13 PROCEDURE — 250N000009 HC RX 250: Performed by: SURGERY

## 2021-08-13 PROCEDURE — 250N000009 HC RX 250: Performed by: NURSE ANESTHETIST, CERTIFIED REGISTERED

## 2021-08-13 RX ORDER — SODIUM CHLORIDE, SODIUM LACTATE, POTASSIUM CHLORIDE, CALCIUM CHLORIDE 600; 310; 30; 20 MG/100ML; MG/100ML; MG/100ML; MG/100ML
INJECTION, SOLUTION INTRAVENOUS CONTINUOUS
Status: DISCONTINUED | OUTPATIENT
Start: 2021-08-13 | End: 2021-08-13 | Stop reason: HOSPADM

## 2021-08-13 RX ORDER — PROPOFOL 10 MG/ML
INJECTION, EMULSION INTRAVENOUS CONTINUOUS PRN
Status: DISCONTINUED | OUTPATIENT
Start: 2021-08-13 | End: 2021-08-14

## 2021-08-13 RX ORDER — PROPOFOL 10 MG/ML
INJECTION, EMULSION INTRAVENOUS PRN
Status: DISCONTINUED | OUTPATIENT
Start: 2021-08-13 | End: 2021-08-14

## 2021-08-13 RX ORDER — LIDOCAINE 40 MG/G
CREAM TOPICAL
Status: DISCONTINUED | OUTPATIENT
Start: 2021-08-13 | End: 2021-08-13 | Stop reason: HOSPADM

## 2021-08-13 RX ADMIN — SODIUM CHLORIDE, POTASSIUM CHLORIDE, SODIUM LACTATE AND CALCIUM CHLORIDE: 600; 310; 30; 20 INJECTION, SOLUTION INTRAVENOUS at 09:35

## 2021-08-13 RX ADMIN — PROPOFOL 200 MCG/KG/MIN: 10 INJECTION, EMULSION INTRAVENOUS at 10:58

## 2021-08-13 RX ADMIN — PROPOFOL 50 MG: 10 INJECTION, EMULSION INTRAVENOUS at 10:58

## 2021-08-13 RX ADMIN — LIDOCAINE HYDROCHLORIDE 0.1 ML: 10 INJECTION, SOLUTION EPIDURAL; INFILTRATION; INTRACAUDAL; PERINEURAL at 09:35

## 2021-08-13 ASSESSMENT — MIFFLIN-ST. JEOR: SCORE: 1848.29

## 2021-08-13 NOTE — INTERVAL H&P NOTE
I have reviewed the surgical (or preoperative) H&P that is linked to this encounter, and examined the patient. There are no significant changes    1st screening colonoscopy; no famhx of colon cancer; no blood thinner.    [facility], DO

## 2021-08-14 NOTE — ANESTHESIA POSTPROCEDURE EVALUATION
Patient: Ihsan Marcus    Procedure(s):  COLONOSCOPY    Diagnosis:Colon cancer screening [Z12.11]  Diagnosis Additional Information: No value filed.    Anesthesia Type:  General    Note:  Disposition: Outpatient   Postop Pain Control: Uneventful            Sign Out: Well controlled pain   PONV: No   Neuro/Psych: Uneventful            Sign Out: Acceptable/Baseline neuro status   Airway/Respiratory: Uneventful            Sign Out: Acceptable/Baseline resp. status   CV/Hemodynamics: Uneventful            Sign Out: Acceptable CV status; No obvious hypovolemia; No obvious fluid overload   Other NRE: NONE   DID A NON-ROUTINE EVENT OCCUR? No           Last vitals:  Vitals Value Taken Time   /98 08/13/21 1130   Temp     Pulse 57 08/13/21 1130   Resp 16 08/13/21 1130   SpO2 99 % 08/13/21 1132   Vitals shown include unvalidated device data.    Electronically Signed By: TIERRA Anderson CRNA  August 14, 2021  4:14 PM

## 2021-08-14 NOTE — ANESTHESIA CARE TRANSFER NOTE
Patient: Ihsan Marcus    Procedure(s):  COLONOSCOPY    Diagnosis: Colon cancer screening [Z12.11]  Diagnosis Additional Information: No value filed.    Anesthesia Type:   General     Note:    Oropharynx: oropharynx clear of all foreign objects  Level of Consciousness: drowsy  Oxygen Supplementation: nasal cannula    Independent Airway: airway patency satisfactory and stable  Dentition: dentition unchanged  Vital Signs Stable: post-procedure vital signs reviewed and stable  Report to RN Given: handoff report given  Patient transferred to: Phase II    Handoff Report: Identifed the Patient, Identified the Reponsible Provider, Reviewed the pertinent medical history, Discussed the surgical course, Reviewed Intra-OP anesthesia mangement and issues during anesthesia, Set expectations for post-procedure period and Allowed opportunity for questions and acknowledgement of understanding      Vitals:  Vitals Value Taken Time   /98 08/13/21 1130   Temp     Pulse 57 08/13/21 1130   Resp 16 08/13/21 1130   SpO2 99 % 08/13/21 1132   Vitals shown include unvalidated device data.    Electronically Signed By: TIERRA Anderson CRNA  August 14, 2021  4:14 PM

## 2021-09-10 ENCOUNTER — VIRTUAL VISIT (OUTPATIENT)
Dept: FAMILY MEDICINE | Facility: CLINIC | Age: 51
End: 2021-09-10
Payer: MEDICARE

## 2021-09-10 ENCOUNTER — LAB (OUTPATIENT)
Dept: LAB | Facility: CLINIC | Age: 51
End: 2021-09-10
Attending: PHYSICIAN ASSISTANT
Payer: MEDICARE

## 2021-09-10 DIAGNOSIS — Z20.822 EXPOSURE TO 2019 NOVEL CORONAVIRUS: Primary | ICD-10-CM

## 2021-09-10 DIAGNOSIS — Z20.822 EXPOSURE TO 2019 NOVEL CORONAVIRUS: ICD-10-CM

## 2021-09-10 PROCEDURE — U0003 INFECTIOUS AGENT DETECTION BY NUCLEIC ACID (DNA OR RNA); SEVERE ACUTE RESPIRATORY SYNDROME CORONAVIRUS 2 (SARS-COV-2) (CORONAVIRUS DISEASE [COVID-19]), AMPLIFIED PROBE TECHNIQUE, MAKING USE OF HIGH THROUGHPUT TECHNOLOGIES AS DESCRIBED BY CMS-2020-01-R: HCPCS

## 2021-09-10 PROCEDURE — U0005 INFEC AGEN DETEC AMPLI PROBE: HCPCS

## 2021-09-10 PROCEDURE — 99213 OFFICE O/P EST LOW 20 MIN: CPT | Mod: 95 | Performed by: PHYSICIAN ASSISTANT

## 2021-09-10 RX ORDER — OLANZAPINE 10 MG/1
TABLET ORAL
COMMUNITY
Start: 2021-07-27 | End: 2022-05-05

## 2021-09-10 NOTE — PROGRESS NOTES
Ihsan is a 51 year old who is being evaluated via a billable video visit.      How would you like to obtain your AVS? Mail a copy  If the video visit is dropped, the invitation should be resent by: Send to e-mail at: [email or web address]   Will anyone else be joining your video visit? April () will; be with patient     Video Start Time: 11:33 AM    Assessment & Plan     ASSESSMENT/PLAN:      ICD-10-CM    1. Exposure to 2019 novel coronavirus  Z20.822 Asymptomatic COVID-19 Virus (Coronavirus) by PCR       COVID-19 test ordered, will schedule  Sent text for [employer].  Discussed vaccine, encouraged this. He will look up ingredients on [employer] website as he is concerned for allergies.  Patient plans to schedule med check appointment with regular clinic.    No follow-ups on file.    Marycarmen Adams PA-C  [facility] is a 51 year old who presents for the following health issues  accompanied by his resident supervisor from group home:    HPI     * requesting COVID test due to exposure 9/3/21, staff member tested positive 9/5/21.  Patient has no symptoms. He needs a negative test to return to work.  He works maintenance.          Review of Systems   Other than noted above, general, HEENT, respiratory, cardiac, MS, and gastrointestinal systems are negative.       Objective           Vitals:  No vitals were obtained today due to virtual visit.    Physical Exam   GENERAL: Healthy, alert and no distress  EYES: Eyes grossly normal to inspection.  No discharge or erythema, or obvious scleral/conjunctival abnormalities.  RESP: No audible wheeze, cough, or visible cyanosis.  No visible retractions or increased work of breathing.    SKIN: Visible skin clear. No significant rash, abnormal pigmentation or lesions.  NEURO: Cranial nerves grossly intact.  Mentation and speech appropriate for age.  PSYCH: Mentation appears normal, affect normal/bright, judgement and insight  intact, normal speech and appearance well-groomed.        Video-Visit Details    Type of service:  Video Visit    Video End Time:11:43 AM    Originating Location (pt. Location): Home    Distant Location (provider location):  [facility]     Platform used for Video Visit: Shashi

## 2021-09-11 LAB — SARS-COV-2 RNA RESP QL NAA+PROBE: NEGATIVE

## 2021-09-19 ENCOUNTER — HEALTH MAINTENANCE LETTER (OUTPATIENT)
Age: 51
End: 2021-09-19

## 2021-09-23 ENCOUNTER — OFFICE VISIT (OUTPATIENT)
Dept: FAMILY MEDICINE | Facility: CLINIC | Age: 51
End: 2021-09-23
Payer: MEDICARE

## 2021-09-23 VITALS
HEIGHT: 73 IN | SYSTOLIC BLOOD PRESSURE: 130 MMHG | BODY MASS INDEX: 25.98 KG/M2 | WEIGHT: 196 LBS | HEART RATE: 64 BPM | DIASTOLIC BLOOD PRESSURE: 76 MMHG | RESPIRATION RATE: 14 BRPM | TEMPERATURE: 97.4 F | OXYGEN SATURATION: 97 %

## 2021-09-23 DIAGNOSIS — L84 CALLUS OF FOOT: Primary | ICD-10-CM

## 2021-09-23 DIAGNOSIS — R05.9 COUGH: ICD-10-CM

## 2021-09-23 PROCEDURE — 11056 PARNG/CUTG B9 HYPRKR LES 2-4: CPT | Performed by: NURSE PRACTITIONER

## 2021-09-23 PROCEDURE — 99212 OFFICE O/P EST SF 10 MIN: CPT | Mod: 25 | Performed by: NURSE PRACTITIONER

## 2021-09-23 ASSESSMENT — MIFFLIN-ST. JEOR: SCORE: 1802.93

## 2021-09-23 ASSESSMENT — PAIN SCALES - GENERAL: PAINLEVEL: NO PAIN (0)

## 2021-09-23 NOTE — PATIENT INSTRUCTIONS
Pared down 3 callouses today on feet. Continue to apply lotion to the feel to reduce these from forming further.     Lungs sound good today.

## 2021-09-23 NOTE — PROGRESS NOTES
"    Assessment & Plan     Callus of foot  Pared down callouses x3 see diagram below.  Standard clean technique was utilized.  #15 blade used for paring down calluses.  No blood loss.  Patient tolerated procedure well.  Follow-up as needed for repeat.    Cough  No cough today.  No wheezing.  Reassured patient that lungs sound healthy.                   Return in about 3 months (around 12/23/2021) for or sooner if symptoms fail to improve.    Mayda Albarran, TIERRA CNP  [facility]   Ihsan is a 51 year old who presents for the following health issues     HPI     Chief Complaint   Patient presents with     RECHECK     pt is having wheezing x 1 week still is living in same place where mold is      Toe Pain     pt has some calus on second toe on both feet he wants removed     Bilateral 2nd toe calluses present. These have required paring down in the past. He has been placing wart remover on the skin. He believes it is helping reduce the size of the callus. In addition he continues to have the callous on the bottom of his foot that is bothersome when he walks. It is getting bigger.   Finally he would like me to listen to his lungs. He feels that he is wheezing and believes he is being exposed to mold in his home where he is currently living. He denies any significant sob, or difficulty breathing.       Review of Systems   Constitutional, HEENT, cardiovascular, pulmonary, gi and gu systems are negative, except as otherwise noted.      Objective    BP (!) 146/83 (BP Location: Right arm, Patient Position: Chair, Cuff Size: Adult Large)   Pulse 64   Temp 97.4  F (36.3  C) (Tympanic)   Resp 14   Ht 1.854 m (6' 1\")   Wt 88.9 kg (196 lb)   SpO2 97%   BMI 25.86 kg/m    Body mass index is 25.86 kg/m .  Physical Exam  Musculoskeletal:        Feet:           GENERAL: healthy, alert and no distress  RESP: lungs clear to auscultation - no rales, rhonchi or wheezes  CV: regular rate and " rhythm, normal S1 S2, no S3 or S4, no murmur, click or rub, no peripheral edema and peripheral pulses strong  MS: no gross musculoskeletal defects noted, no edema  SKIN: no suspicious lesions or rashes

## 2021-09-24 ASSESSMENT — ASTHMA QUESTIONNAIRES: ACT_TOTALSCORE: 25

## 2021-10-07 ENCOUNTER — VIRTUAL VISIT (OUTPATIENT)
Dept: FAMILY MEDICINE | Facility: CLINIC | Age: 51
End: 2021-10-07
Payer: MEDICARE

## 2021-10-07 DIAGNOSIS — R05.9 COUGH: Primary | ICD-10-CM

## 2021-10-07 PROBLEM — K21.00 REFLUX ESOPHAGITIS: Status: RESOLVED | Noted: 2019-08-23 | Resolved: 2021-10-07

## 2021-10-07 PROBLEM — H57.10 PAIN IN EYE: Status: RESOLVED | Noted: 2017-12-18 | Resolved: 2021-10-07

## 2021-10-07 PROBLEM — R10.32 LEFT LOWER QUADRANT ABDOMINAL PAIN: Status: RESOLVED | Noted: 2019-08-06 | Resolved: 2021-10-07

## 2021-10-07 PROBLEM — R07.9 CHEST PAIN: Status: RESOLVED | Noted: 2018-01-25 | Resolved: 2021-10-07

## 2021-10-07 PROCEDURE — 99442 PR PHYSICIAN TELEPHONE EVALUATION 11-20 MIN: CPT | Mod: 95 | Performed by: FAMILY MEDICINE

## 2021-10-07 NOTE — PROGRESS NOTES
Ihsan is a 51 year old who is being evaluated via a billable telephone visit.      What phone number would you like to be contacted at? [phone]- Group Home  How would you like to obtain your AVS? MyChart    Assessment & Plan     Cough    This patient has not been immunized against Covid.  We will test for Covid today.  We discussed how important a Covid vaccine is to his overall health and he is interested in getting the Covid vaccine after he is over this illness.  He is a smoker and I have counseled him on trying to cut down while he is ill and trying to quit in general.    - Symptomatic COVID-19 Virus (Coronavirus) by PCR; Future      15 minutes spent on the date of the encounter doing chart review, review of test results, interpretation of tests, patient visit, documentation and Discussion with group home staff         No follow-ups on file.    Edna Hector DO  [facility]    Funmilayo Champagne is a 51 year old who presents for the following health issues  accompanied by his Staff member- April or Jennifer:    HPI     Acute Illness  Acute illness concerns: Would like Covid testing  Onset/Duration: Few days  Symptoms:  Fever: no  Chills/Sweats: YES- chills, is hot and then cold  Headache (location?): no  Sinus Pressure: no  Conjunctivitis:  no  Ear Pain: no  Rhinorrhea: YES  Congestion: YES  Sore Throat: no  Cough: YES-non-productive- smoker  Wheeze: no  Decreased Appetite: no  Nausea: no  Vomiting: no  Diarrhea: YES  Dysuria/Freq.: no  Dysuria or Hematuria: no  Fatigue/Achiness: YES, but doesn't sleep well at night anyways  Sick/Strep Exposure:  A staff member was Covid positive and was quarantined- that staff is now back and work and patient started saying he's having symptoms- he was tested 9/10/21 which was negative    Therapies tried and outcome:  Robitussin as needed        Review of Systems   Constitutional, HEENT, cardiovascular, pulmonary, gi and gu systems are negative, except  as otherwise noted.      Objective           Vitals:  No vitals were obtained today due to virtual visit.    Physical Exam   healthy, alert and no distress  PSYCH: Alert and oriented times 3; coherent speech, normal   rate and volume, able to articulate logical thoughts, able   to abstract reason, no tangential thoughts, no hallucinations   or delusions  His affect is normal  RESP: No cough, no audible wheezing, able to talk in full sentences  Remainder of exam unable to be completed due to telephone visits            Phone call duration: 15 minutes

## 2021-10-08 ENCOUNTER — LAB (OUTPATIENT)
Dept: FAMILY MEDICINE | Facility: CLINIC | Age: 51
End: 2021-10-08
Attending: FAMILY MEDICINE
Payer: MEDICARE

## 2021-10-08 DIAGNOSIS — R05.9 COUGH: ICD-10-CM

## 2021-10-08 PROCEDURE — U0003 INFECTIOUS AGENT DETECTION BY NUCLEIC ACID (DNA OR RNA); SEVERE ACUTE RESPIRATORY SYNDROME CORONAVIRUS 2 (SARS-COV-2) (CORONAVIRUS DISEASE [COVID-19]), AMPLIFIED PROBE TECHNIQUE, MAKING USE OF HIGH THROUGHPUT TECHNOLOGIES AS DESCRIBED BY CMS-2020-01-R: HCPCS

## 2021-10-11 LAB — SARS-COV-2 RNA RESP QL NAA+PROBE: NEGATIVE

## 2021-10-26 ENCOUNTER — OFFICE VISIT (OUTPATIENT)
Dept: FAMILY MEDICINE | Facility: CLINIC | Age: 51
End: 2021-10-26
Payer: MEDICARE

## 2021-10-26 VITALS
TEMPERATURE: 98.3 F | WEIGHT: 198 LBS | DIASTOLIC BLOOD PRESSURE: 60 MMHG | OXYGEN SATURATION: 98 % | SYSTOLIC BLOOD PRESSURE: 138 MMHG | HEIGHT: 73 IN | HEART RATE: 64 BPM | BODY MASS INDEX: 26.24 KG/M2 | RESPIRATION RATE: 16 BRPM

## 2021-10-26 DIAGNOSIS — Z53.9 ERRONEOUS ENCOUNTER--DISREGARD: Primary | ICD-10-CM

## 2021-10-26 RX ORDER — LANOLIN ALCOHOL/MO/W.PET/CERES
CREAM (GRAM) TOPICAL
COMMUNITY
Start: 2021-09-24 | End: 2022-02-08

## 2021-10-26 ASSESSMENT — MIFFLIN-ST. JEOR: SCORE: 1812

## 2021-10-26 NOTE — PATIENT INSTRUCTIONS
Our Clinic hours are:  Mondays    7:20 am - 7 pm  Tues -  Fri  7:20 am - 5 pm    Clinic Phone: [phone]    The clinic lab opens at 7:30 am Mon - Fri and appointments are required.    [facility]. [phone]  Monday  8 am - 7pm  Tues - Fri 8 am - 5:30 pm

## 2021-11-05 ENCOUNTER — OFFICE VISIT (OUTPATIENT)
Dept: FAMILY MEDICINE | Facility: CLINIC | Age: 51
End: 2021-11-05
Payer: MEDICARE

## 2021-11-05 ENCOUNTER — ANCILLARY PROCEDURE (OUTPATIENT)
Dept: GENERAL RADIOLOGY | Facility: CLINIC | Age: 51
End: 2021-11-05
Attending: NURSE PRACTITIONER
Payer: MEDICARE

## 2021-11-05 VITALS
TEMPERATURE: 98.3 F | SYSTOLIC BLOOD PRESSURE: 142 MMHG | RESPIRATION RATE: 16 BRPM | DIASTOLIC BLOOD PRESSURE: 82 MMHG | HEIGHT: 73 IN | HEART RATE: 80 BPM | OXYGEN SATURATION: 99 % | BODY MASS INDEX: 25.98 KG/M2 | WEIGHT: 196 LBS

## 2021-11-05 DIAGNOSIS — M54.2 NECK PAIN: ICD-10-CM

## 2021-11-05 DIAGNOSIS — R06.02 SOB (SHORTNESS OF BREATH): ICD-10-CM

## 2021-11-05 DIAGNOSIS — L84 CALLUS OF FOOT: ICD-10-CM

## 2021-11-05 DIAGNOSIS — R20.2 ARM PARESTHESIA, LEFT: ICD-10-CM

## 2021-11-05 DIAGNOSIS — Z28.21 COVID-19 VACCINATION DECLINED: ICD-10-CM

## 2021-11-05 DIAGNOSIS — M54.2 NECK PAIN: Primary | ICD-10-CM

## 2021-11-05 PROCEDURE — 71046 X-RAY EXAM CHEST 2 VIEWS: CPT | Mod: FY | Performed by: RADIOLOGY

## 2021-11-05 PROCEDURE — 72040 X-RAY EXAM NECK SPINE 2-3 VW: CPT | Mod: FY | Performed by: RADIOLOGY

## 2021-11-05 PROCEDURE — 99214 OFFICE O/P EST MOD 30 MIN: CPT | Performed by: NURSE PRACTITIONER

## 2021-11-05 ASSESSMENT — PAIN SCALES - GENERAL: PAINLEVEL: SEVERE PAIN (6)

## 2021-11-05 ASSESSMENT — MIFFLIN-ST. JEOR: SCORE: 1802.93

## 2021-11-05 NOTE — NURSING NOTE
"Initial BP (!) 142/82   Pulse 80   Temp 98.3  F (36.8  C) (Tympanic)   Resp 16   Ht 1.854 m (6' 1\")   Wt 88.9 kg (196 lb)   SpO2 99%   BMI 25.86 kg/m   Estimated body mass index is 25.86 kg/m  as calculated from the following:    Height as of this encounter: 1.854 m (6' 1\").    Weight as of this encounter: 88.9 kg (196 lb). .      "

## 2021-11-05 NOTE — PATIENT INSTRUCTIONS
Okay to use pumice stone on callouses    Start PT for neck pain    Monitor symptoms of shortness of breath and GI symptoms.

## 2021-11-05 NOTE — PROGRESS NOTES
Assessment & Plan     Neck pain  Xray normal. No significant abnormalities. Start PT to work on ROM  - XR Cervical Spine 2/3 Views; Future  - Physical Therapy Referral; Future    SOB (shortness of breath)  Clear. No signs of pneumonia. Reassurance provided to patient. Follow up if worsening.   - XR Chest 2 Views; Future    Callus of foot  Pared down with #10 scalpel today. Tolerated well.     Arm paresthesia, left  Likely from neck pain. Start PT. If no improvement follow up.     COVID-19 vaccination declined  Does not desire immunization.                    Return in about 2 months (around 1/5/2022) for recheck/ medication check.    TIERRA Dickens CNP  [facility]    Funmilayo Champagne is a 51 year old who presents for the following health issues  accompanied by his DSP.    HPI     Chief Complaint   Patient presents with     Derm Problem     Callus on middle toes on right and left feet.     Back Pain     Patient reports that chiropactor told him thath he had a pinched nerve and that he should see his doctor     Respiratory Problems     Patient would like to have lungs checked for mold     Has concerns of mold in his lungs and feeling mildly SOB. He remarks some mid chest pain around the ribs and sternum. This hurts right now and all the time. NO changes in activity. No COVID exposures. House work has been occurring. More dust has been present.     He saw a chiropractor today for upper back pain and was told he may have a pinched nerve. He has pain in his neck along with numbness side of face, neck, and spine. Arm numbness on the outside of arm to the elbow. No change in strength of hands. Neck and shoulder pain prevents him from laying and lifting his arm up. He is limited to about 70 degrees/.     He would like me to re evaluate the callouses on toes that continue to return. I have trimmed the hyperkertotic callouses of his 2nd toes and bottom of the left foot. These continue to  "return and are bothersome for patient and cause pain.      Patient has a significant past medical history for schizophrenia, bipolar type I, borderline intellectual functioning, and delusional disorder. He is in remission from drug abuse.      Review of Systems   Constitutional, HEENT, cardiovascular, pulmonary, gi and gu systems are negative, except as otherwise noted.      Objective    BP (!) 142/82   Pulse 80   Temp 98.3  F (36.8  C) (Tympanic)   Resp 16   Ht 1.854 m (6' 1\")   Wt 88.9 kg (196 lb)   SpO2 99%   BMI 25.86 kg/m    Body mass index is 25.86 kg/m .     Physical Exam   GENERAL: healthy, alert and no distress  NECK: no adenopathy, no asymmetry, masses, or scars and thyroid normal to palpation  RESP: lungs clear to auscultation - no rales, rhonchi or wheezes  CV: regular rate and rhythm, normal S1 S2, no S3 or S4, no murmur, click or rub, no peripheral edema and peripheral pulses strong  ABDOMEN: soft, nontender, no hepatosplenomegaly, no masses and bowel sounds normal  SKIN: Hyperkeratotic lesions on the tips of the second toes bilaterally and thickened callus on the bottom of his left foot. Left foot second toe lesion develops a horn. Right foot second toe wide base no specific horn but significantly thickened callus.  PSYCH: mentation appears normal, affect flat, judgement and insight impaired, appearance well groomed and at baseline mental status.  Appears stable currently.                 "

## 2021-11-10 ENCOUNTER — HOSPITAL ENCOUNTER (OUTPATIENT)
Dept: PHYSICAL THERAPY | Facility: CLINIC | Age: 51
Setting detail: THERAPIES SERIES
End: 2021-11-10
Attending: NURSE PRACTITIONER
Payer: MEDICARE

## 2021-11-10 DIAGNOSIS — M54.2 NECK PAIN: ICD-10-CM

## 2021-11-10 PROCEDURE — 97161 PT EVAL LOW COMPLEX 20 MIN: CPT | Mod: GP | Performed by: PHYSICAL THERAPIST

## 2021-11-10 PROCEDURE — 97140 MANUAL THERAPY 1/> REGIONS: CPT | Mod: GP | Performed by: PHYSICAL THERAPIST

## 2021-11-10 PROCEDURE — 97110 THERAPEUTIC EXERCISES: CPT | Mod: GP | Performed by: PHYSICAL THERAPIST

## 2021-11-10 NOTE — PROGRESS NOTES
[facility]          OUTPATIENT PHYSICAL THERAPY ORTHOPEDIC EVALUATION  PLAN OF TREATMENT FOR OUTPATIENT REHABILITATION  (COMPLETE FOR INITIAL CLAIMS ONLY)  Patient's Last Name, First Name, M.I.  YOB: 1970  Ihsan Marcus    Provider s Name:  [facility]   Medical Record No.  [id number]   Start of Care Date:  11/10/21   Onset Date:  11/10/15   Type:     _X__PT   ___OT   ___SLP Medical Diagnosis:  Neck pain (M54.2)     PT Diagnosis:  Left cervical radiculopathy   Visits from SOC:  1      _________________________________________________________________________________  Plan of Treatment/Functional Goals:  joint mobilization,manual therapy,neuromuscular re-education,ROM,strengthening,stretching     Cryotherapy,Hot packs,Traction     Goals  Goal Identifier: HEP  Goal Description: Pt will be independent in [facility] in order to achieve and maintain long term treatment goals.  Target Date: 12/10/21    Goal Identifier: Sleep  Goal Description: Pt will be able to sleep on his left side without waking up due to pain.  Target Date: 01/05/22    Goal Identifier: Overhead  Goal Description: Pt will be able to reach overhead to grab items from a cupboard with a minimal increase in discomfort 1-2/10.  Target Date: 01/05/22    Goal Identifier: Carrying  Goal Description: Pt will be able to carry items in his left hand with a minimal increase in pain 1-2/10.  Target Date: 01/05/22    Goal Identifier: Rotation  Goal Description: Pt will be able to look over his left shoulder with a minimal increase in pain 1-2/10.  Target Date: 01/05/22                                     Therapy Frequency:  1 time/week  Predicted Duration of Therapy Intervention:  8 weeks    Joesph Coyne, PT                 I CERTIFY THE NEED FOR THESE SERVICES FURNISHED UNDER        THIS PLAN OF TREATMENT AND WHILE UNDER MY CARE     (Physician co-signature of this document indicates review  and certification of the therapy plan).                       Certification Date From:  11/10/21   Certification Date To:  01/05/22    Referring Provider:  Mayda Albarran CNP    Initial Assessment        See Epic Evaluation Start of Care Date: 11/10/21
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
## 2021-11-17 ENCOUNTER — HOSPITAL ENCOUNTER (OUTPATIENT)
Dept: PHYSICAL THERAPY | Facility: CLINIC | Age: 51
Setting detail: THERAPIES SERIES
End: 2021-11-17
Attending: NURSE PRACTITIONER
Payer: MEDICARE

## 2021-11-17 PROCEDURE — 97140 MANUAL THERAPY 1/> REGIONS: CPT | Mod: GP | Performed by: PHYSICAL THERAPIST

## 2021-11-24 ENCOUNTER — HOSPITAL ENCOUNTER (OUTPATIENT)
Dept: PHYSICAL THERAPY | Facility: CLINIC | Age: 51
Setting detail: THERAPIES SERIES
End: 2021-11-24
Attending: NURSE PRACTITIONER
Payer: MEDICARE

## 2021-11-24 PROCEDURE — 97140 MANUAL THERAPY 1/> REGIONS: CPT | Mod: GP | Performed by: PHYSICAL THERAPIST

## 2021-11-24 PROCEDURE — 97035 APP MDLTY 1+ULTRASOUND EA 15: CPT | Mod: GP | Performed by: PHYSICAL THERAPIST

## 2021-12-01 ENCOUNTER — HOSPITAL ENCOUNTER (OUTPATIENT)
Dept: PHYSICAL THERAPY | Facility: CLINIC | Age: 51
Setting detail: THERAPIES SERIES
End: 2021-12-01
Attending: NURSE PRACTITIONER
Payer: MEDICARE

## 2021-12-01 PROCEDURE — 97535 SELF CARE MNGMENT TRAINING: CPT | Mod: GP | Performed by: PHYSICAL THERAPIST

## 2021-12-01 PROCEDURE — 97035 APP MDLTY 1+ULTRASOUND EA 15: CPT | Mod: GP | Performed by: PHYSICAL THERAPIST

## 2021-12-01 PROCEDURE — 97140 MANUAL THERAPY 1/> REGIONS: CPT | Mod: GP | Performed by: PHYSICAL THERAPIST

## 2021-12-07 ENCOUNTER — OFFICE VISIT (OUTPATIENT)
Dept: FAMILY MEDICINE | Facility: CLINIC | Age: 51
End: 2021-12-07
Payer: MEDICARE

## 2021-12-07 ENCOUNTER — HOSPITAL ENCOUNTER (OUTPATIENT)
Dept: GENERAL RADIOLOGY | Facility: CLINIC | Age: 51
Discharge: HOME | End: 2021-12-07
Attending: NURSE PRACTITIONER | Admitting: NURSE PRACTITIONER
Payer: MEDICARE

## 2021-12-07 VITALS
HEART RATE: 69 BPM | BODY MASS INDEX: 26.37 KG/M2 | DIASTOLIC BLOOD PRESSURE: 74 MMHG | OXYGEN SATURATION: 97 % | WEIGHT: 199 LBS | TEMPERATURE: 97.4 F | RESPIRATION RATE: 14 BRPM | SYSTOLIC BLOOD PRESSURE: 118 MMHG | HEIGHT: 73 IN

## 2021-12-07 DIAGNOSIS — R05.9 COUGH: Primary | ICD-10-CM

## 2021-12-07 DIAGNOSIS — Z77.120 EXPOSURE TO MOLD: ICD-10-CM

## 2021-12-07 DIAGNOSIS — R05.9 COUGH: ICD-10-CM

## 2021-12-07 DIAGNOSIS — L84 CALLUS OF FOOT: ICD-10-CM

## 2021-12-07 LAB
ALBUMIN SERPL-MCNC: 3.5 G/DL (ref 3.4–5)
ALP SERPL-CCNC: 75 U/L (ref 40–150)
ALT SERPL W P-5'-P-CCNC: 22 U/L (ref 0–70)
ANION GAP SERPL CALCULATED.3IONS-SCNC: 4 MMOL/L (ref 3–14)
AST SERPL W P-5'-P-CCNC: 16 U/L (ref 0–45)
BASOPHILS # BLD AUTO: 0 10E3/UL (ref 0–0.2)
BASOPHILS NFR BLD AUTO: 0 %
BILIRUB SERPL-MCNC: 0.5 MG/DL (ref 0.2–1.3)
BUN SERPL-MCNC: 12 MG/DL (ref 7–30)
CALCIUM SERPL-MCNC: 8.9 MG/DL (ref 8.5–10.1)
CHLORIDE BLD-SCNC: 111 MMOL/L (ref 94–109)
CO2 SERPL-SCNC: 29 MMOL/L (ref 20–32)
CREAT SERPL-MCNC: 0.97 MG/DL (ref 0.66–1.25)
EOSINOPHIL # BLD AUTO: 0.1 10E3/UL (ref 0–0.7)
EOSINOPHIL NFR BLD AUTO: 1 %
ERYTHROCYTE [DISTWIDTH] IN BLOOD BY AUTOMATED COUNT: 14.9 % (ref 10–15)
GFR SERPL CREATININE-BSD FRML MDRD: >90 ML/MIN/1.73M2
GLUCOSE BLD-MCNC: 91 MG/DL (ref 70–99)
HCT VFR BLD AUTO: 38.4 % (ref 40–53)
HGB BLD-MCNC: 12.5 G/DL (ref 13.3–17.7)
LYMPHOCYTES # BLD AUTO: 2.2 10E3/UL (ref 0.8–5.3)
LYMPHOCYTES NFR BLD AUTO: 34 %
MCH RBC QN AUTO: 30.2 PG (ref 26.5–33)
MCHC RBC AUTO-ENTMCNC: 32.6 G/DL (ref 31.5–36.5)
MCV RBC AUTO: 93 FL (ref 78–100)
MONOCYTES # BLD AUTO: 0.6 10E3/UL (ref 0–1.3)
MONOCYTES NFR BLD AUTO: 9 %
NEUTROPHILS # BLD AUTO: 3.5 10E3/UL (ref 1.6–8.3)
NEUTROPHILS NFR BLD AUTO: 56 %
PLATELET # BLD AUTO: 200 10E3/UL (ref 150–450)
POTASSIUM BLD-SCNC: 3.8 MMOL/L (ref 3.4–5.3)
PROT SERPL-MCNC: 6.9 G/DL (ref 6.8–8.8)
RBC # BLD AUTO: 4.14 10E6/UL (ref 4.4–5.9)
SODIUM SERPL-SCNC: 144 MMOL/L (ref 133–144)
WBC # BLD AUTO: 6.3 10E3/UL (ref 4–11)

## 2021-12-07 PROCEDURE — 36415 COLL VENOUS BLD VENIPUNCTURE: CPT | Performed by: NURSE PRACTITIONER

## 2021-12-07 PROCEDURE — 99214 OFFICE O/P EST MOD 30 MIN: CPT | Performed by: NURSE PRACTITIONER

## 2021-12-07 PROCEDURE — 85025 COMPLETE CBC W/AUTO DIFF WBC: CPT | Performed by: NURSE PRACTITIONER

## 2021-12-07 PROCEDURE — 80053 COMPREHEN METABOLIC PANEL: CPT | Performed by: NURSE PRACTITIONER

## 2021-12-07 PROCEDURE — 71046 X-RAY EXAM CHEST 2 VIEWS: CPT

## 2021-12-07 ASSESSMENT — MIFFLIN-ST. JEOR: SCORE: 1816.54

## 2021-12-07 ASSESSMENT — PAIN SCALES - GENERAL: PAINLEVEL: MODERATE PAIN (5)

## 2021-12-07 NOTE — PROGRESS NOTES
"  Assessment & Plan     Cough  Etiology unclear. Recommend rechecking a cxr to evaluate further to provide reassurance to patient.   - XR Chest 2 Views; Future  - CBC with platelets and differential; Future  - Comprehensive metabolic panel (BMP + Alb, Alk Phos, ALT, AST, Total. Bili, TP); Future  - CBC with platelets and differential  - Comprehensive metabolic panel (BMP + Alb, Alk Phos, ALT, AST, Total. Bili, TP)    Exposure to mold  Per patients report. Will obtain a cxr.   - XR Chest 2 Views; Future    Callus of foot  Pared down callouses x 3. 2nd toes of bilateral feet as well as corn on bottom of left foot.                BMI:   Estimated body mass index is 26.25 kg/m  as calculated from the following:    Height as of this encounter: 1.854 m (6' 1\").    Weight as of this encounter: 90.3 kg (199 lb).           Return in about 5 years (around 12/7/2026) for for callous treatment if needed or sooner if symptoms fail to improve.    TIERRA Dickens [facility]   Ihsan is a 51 year old who presents for the following health issues     HPI   Chief Complaint   Patient presents with     RECHECK     lungs pt states still having pain and cough, needs calus removed off toes left and right second     Dx with a questionable pneumonia in July, antibiotic prescribed at that time. November xray was normal. He is concerned that there is persistent exposure to mold in his residence and reports that 2 other residents in his group home have had pneumonia. No COVID. He feels mildly nauseated at times and this is increasing. Construction is currently occurring at the group home. He would like a chest xray completed today to evaluate further. He is fearful that he has a mold related to pneumonia.     He also would like the callouses on his toes pared down again. This has been immensely helpful in the past with reduction of the toe pain. He has needed this done numerous times. Last time " "was 1 month ago.       Review of Systems   Constitutional, HEENT, cardiovascular, pulmonary, gi and gu systems are negative, except as otherwise noted.      Objective    /74 (BP Location: Right arm, Patient Position: Chair, Cuff Size: Adult Large)   Pulse 69   Temp 97.4  F (36.3  C) (Tympanic)   Resp 14   Ht 1.854 m (6' 1\")   Wt 90.3 kg (199 lb)   SpO2 97%   BMI 26.25 kg/m    Body mass index is 26.25 kg/m .  Physical Exam   GENERAL: healthy, alert and no distress  NECK: no adenopathy, no asymmetry, masses, or scars and thyroid normal to palpation  RESP: lungs clear to auscultation - no rales, rhonchi or wheezes  CV: regular rate and rhythm, normal S1 S2, no S3 or S4, no murmur, click or rub, no peripheral edema and peripheral pulses strong  ABDOMEN: soft, nontender, no hepatosplenomegaly, no masses and bowel sounds normal  MS: no gross musculoskeletal defects noted, no edema  SKIN: bilateral 2nd toes corn/ callous growth with horn. Bottom of foot corn present.                 "

## 2021-12-07 NOTE — PATIENT INSTRUCTIONS
Follow up with chest xray at [facility] ([facility]) I will be in touch regarding xray if abnormal.     Follow up in 5 weeks for callous treatment if needed.

## 2021-12-07 NOTE — LETTER
December 7, 2021      Ihsan Marcus  [address] [facility] [address] [address]        To Whom It May Concern:    Ihsan Marcus  was seen on 12/7/2021.  It is in my opinion that Ihsan can dispense and take his prescribed medications with use of a pill box independently with monitoring to assure he is taking the medications correctly.       Sincerely,          TIERRA Dickens CNP

## 2021-12-08 ENCOUNTER — HOSPITAL ENCOUNTER (OUTPATIENT)
Dept: PHYSICAL THERAPY | Facility: CLINIC | Age: 51
Setting detail: THERAPIES SERIES
End: 2021-12-08
Attending: NURSE PRACTITIONER
Payer: MEDICARE

## 2021-12-08 PROCEDURE — 97110 THERAPEUTIC EXERCISES: CPT | Mod: GP | Performed by: PHYSICAL THERAPIST

## 2021-12-08 PROCEDURE — 97035 APP MDLTY 1+ULTRASOUND EA 15: CPT | Mod: GP | Performed by: PHYSICAL THERAPIST

## 2021-12-16 DIAGNOSIS — E78.2 MIXED HYPERLIPIDEMIA: ICD-10-CM

## 2021-12-17 RX ORDER — ROSUVASTATIN CALCIUM 10 MG/1
TABLET, COATED ORAL
Qty: 90 TABLET | Refills: 3 | Status: SHIPPED | OUTPATIENT
Start: 2021-12-17 | End: 2022-11-16

## 2021-12-17 NOTE — TELEPHONE ENCOUNTER
"Requested Prescriptions   Pending Prescriptions Disp Refills     rosuvastatin (CRESTOR) 10 MG tablet [Pharmacy Med Name: ROSUVASTATIN 10MG TAB 10 Tablet] 28 tablet 3     Sig: TAKE ONE TABLET BY MOUTH EVERY DAY       Statins Protocol Failed - 12/16/2021 12:35 PM        Failed - LDL on file in past 12 months     Recent Labs   Lab Test 12/04/20  0000   LDL 77             Passed - No abnormal creatine kinase in past 12 months     No lab results found.             Passed - Recent (12 mo) or future (30 days) visit within the authorizing provider's specialty     Patient has had an office visit with the authorizing provider or a provider within the authorizing providers department within the previous 12 mos or has a future within next 30 days. See \"Patient Info\" tab in inbasket, or \"Choose Columns\" in Meds & Orders section of the refill encounter.              Passed - Medication is active on med list        Passed - Patient is age 18 or older             "

## 2021-12-22 ENCOUNTER — HOSPITAL ENCOUNTER (OUTPATIENT)
Dept: PHYSICAL THERAPY | Facility: CLINIC | Age: 51
Setting detail: THERAPIES SERIES
End: 2021-12-22
Attending: NURSE PRACTITIONER
Payer: MEDICARE

## 2021-12-22 PROCEDURE — 97110 THERAPEUTIC EXERCISES: CPT | Mod: GP | Performed by: PHYSICAL THERAPIST

## 2021-12-29 ENCOUNTER — HOSPITAL ENCOUNTER (OUTPATIENT)
Dept: PHYSICAL THERAPY | Facility: CLINIC | Age: 51
Setting detail: THERAPIES SERIES
End: 2021-12-29
Attending: NURSE PRACTITIONER
Payer: MEDICARE

## 2021-12-29 PROCEDURE — 97110 THERAPEUTIC EXERCISES: CPT | Mod: GP | Performed by: PHYSICAL THERAPIST

## 2022-01-10 ENCOUNTER — TELEPHONE (OUTPATIENT)
Dept: FAMILY MEDICINE | Facility: CLINIC | Age: 52
End: 2022-01-10
Payer: MEDICARE

## 2022-01-10 NOTE — TELEPHONE ENCOUNTER
Patient needs a copy of the 12/7/21 letter  Can that be printed and signed to  at the  today?    Call patient when done [phone]  Ok to leave message

## 2022-01-28 ENCOUNTER — HOSPITAL ENCOUNTER (OUTPATIENT)
Dept: PHYSICAL THERAPY | Facility: CLINIC | Age: 52
Setting detail: THERAPIES SERIES
End: 2022-01-28
Attending: NURSE PRACTITIONER
Payer: MEDICARE

## 2022-01-28 PROCEDURE — 97110 THERAPEUTIC EXERCISES: CPT | Mod: GP | Performed by: PHYSICAL THERAPIST

## 2022-01-28 NOTE — PROGRESS NOTES
Ihsan Marcus  1970  Mayda Albarran, APRN CNP  [address] [place] [address]  Diagnosis:  L cervical radiculopathy Physical Therapy Discharge Summary  01/28/22 1100   Signing Clinician's Name / Credentials   Signing clinician's name / credentials Susy Lopez PT, DPT   Session Number   Session Number 8/365 - YU/MA (L)   Progress Note/Recertification   Progress Note Completed Date 01/28/22   Recertification Due Date 01/28/22   Ortho Goal 1   Goal Identifier HEP   Goal Description Pt will be independent in HEP in order to achieve and maintain long term treatment goals.   Target Date 01/05/22   Date Met 01/28/22   Ortho Goal 2   Goal Identifier Sleep   Goal Description Pt will be able to sleep on his left side without waking up due to pain.   Target Date 01/05/22   Date Met 01/28/22   Ortho Goal 3   Goal Identifier Overhead   Goal Description Pt will be able to reach overhead to grab items from a cupboard with a minimal increase in discomfort 1-2/10.   Target Date 01/05/22   Date Met 01/28/22   Ortho Goal 4   Goal Identifier Carrying   Goal Description Pt will be able to carry items in his left hand with a minimal increase in pain 1-2/10.   Goal Progress carry more bags of groceries than perviously   Target Date 01/05/22   Date Met 01/28/22   Ortho Goal 5   Goal Identifier Rotation   Goal Description Pt will be able to look over his left shoulder with a minimal increase in pain 1-2/10.   Goal Progress 1-2/10   Target Date 01/05/22   Date Met 01/28/22   Subjective Report   Subjective Report left side feels normal but R side really painful b/c overdid exercise ( one of  his own)   Objective Measures   Objective Measures Objective Measure 1;Objective Measure 2;Objective Measure 3;Objective Measure 4   Objective Measure 1   Objective Measure Pain    Details 1/10 left;  8/10 on Right   Objective Measure 2   Objective Measure sit posture   Details minimal post pelvic tilt; still significant  rounded shoulders forward head   Objective Measure 3   Objective Measure AROM cervical   Details 40* extn; 30* flxn; 40* R, 60* Lrotation    Objective Measure 4   Objective Measure AROM shoulder in sitting    Details 130* flxn 95* abd Left; 130-flxn 105* abd   Therapeutic Procedure/exercise   Therapeutic Procedures: strength, endurance, ROM, flexibillity minutes (32939) 30   Skilled Intervention review HEP   Patient Response R UT felt better after stretch   Treatment Detail review UB/levator stretch; pect stretch and gentle cervical rotation to return to due to R UT/neck pain as it is similar to initial L side pain; encouraged very GENTLE return to bilateral strengthening as R UB/neck is painful   Progress return to gentle stretching and strengthening until R pain resolves before returning to more aggressive strengthenng    Education   Learner Patient   Readiness Acceptance   Method Booklet/handout;Explanation;Demonstration   Response Verbalizes Understanding;Demonstrates Understanding   Education Comments vhi   Plan   Home program return to gentle stretching    Updates to plan of care wll DC current episode as left side pain has resolved with goals met   Comments   Comments Pt will see PCP re c/o spine and R UB/neck pain    Total Session Time   Timed Code Treatment Minutes 25   Total Treatment Time (sum of timed and untimed services) 25

## 2022-01-31 ENCOUNTER — OFFICE VISIT (OUTPATIENT)
Dept: FAMILY MEDICINE | Facility: CLINIC | Age: 52
End: 2022-01-31
Payer: MEDICARE

## 2022-01-31 VITALS
WEIGHT: 201 LBS | BODY MASS INDEX: 26.52 KG/M2 | DIASTOLIC BLOOD PRESSURE: 80 MMHG | HEART RATE: 60 BPM | RESPIRATION RATE: 20 BRPM | SYSTOLIC BLOOD PRESSURE: 128 MMHG | OXYGEN SATURATION: 100 %

## 2022-01-31 DIAGNOSIS — G24.3 SPASMODIC TORTICOLLIS: Primary | ICD-10-CM

## 2022-01-31 DIAGNOSIS — L84 CORN OR CALLUS: ICD-10-CM

## 2022-01-31 DIAGNOSIS — L84 CALLUS OF FOOT: ICD-10-CM

## 2022-01-31 PROCEDURE — 99214 OFFICE O/P EST MOD 30 MIN: CPT | Performed by: NURSE PRACTITIONER

## 2022-01-31 RX ORDER — METHOCARBAMOL 750 MG/1
750 TABLET, FILM COATED ORAL 4 TIMES DAILY PRN
Qty: 40 TABLET | Refills: 0 | Status: SHIPPED | OUTPATIENT
Start: 2022-01-31 | End: 2022-02-08

## 2022-01-31 ASSESSMENT — PAIN SCALES - GENERAL: PAINLEVEL: WORST PAIN (10)

## 2022-01-31 NOTE — LETTER
January 31, 2022      Ihsan Marcus  [address] [facility] [address]        To Whom It May Concern:    Ihsan Marcus  was seen on 1/31/2022.  Please excuse him  until neck and shoulder pain has improved.      Sincerely,        TIERRA Dickens CNP

## 2022-01-31 NOTE — PATIENT INSTRUCTIONS
Start treatment for shoulder/ neck pain with prescribed medications as needed.   Okay to wear sling as needed for comfort  Start PT.     STOP using the wart cream.   Start trying the CORN pads (available at retail pharmacies)

## 2022-01-31 NOTE — PROGRESS NOTES
"  Assessment & Plan     Spasmodic torticollis  Muscle relaxants and pain relieving gel prescribed today.  Would consider trigger point injections in that upper shoulder/neck if symptoms are not improving.  Physical therapy referral placed.  - methocarbamol (ROBAXIN) 750 MG tablet; Take 1 tablet (750 mg) by mouth 4 times daily as needed for muscle spasms  - diclofenac (VOLTAREN) 1 % topical gel; Apply 4 g topically 4 times daily As needed for pain in the neck and upper right arm.  - Physical Therapy Referral; Future    Callus of foot  Callus on the bottom of left foot parred down and worked on removing cord.  Not fully removed patient tolerated well reports improvement of symptoms following procedure.    Corn or callus  Calluses on second toes on bilateral feet parted down today in clinic.  Resolution of symptoms associated with calluses after treatment.     BMI:   Estimated body mass index is 26.52 kg/m  as calculated from the following:    Height as of 12/7/21: 1.854 m (6' 1\").    Weight as of this encounter: 91.2 kg (201 lb).           Return in about 4 weeks (around 2/28/2022) for or sooner if symptoms fail to improve.    TIERRA Dickens CNP  [facility]   Ihsan is a 52 year old who presents for the following health issues     HPI     Pain History:  When did you first notice your pain? - Less than 1 week   Have you seen anyone else for your pain? No  Where in your body do you have pain? Musculoskeletal problem/pain  Onset/Duration: About a week   Description  Location: shoulder - right & neck pain on the same side   Joint Swelling: YES  Pain: YES  Intensity:  severe  Progression of Symptoms:  worsening  Accompanying signs and symptoms:   Fevers: no  Numbness/tingling/weakness: YES  History  Trauma to the area: no  Recent illness:  no  Previous similar problem: no  Previous evaluation:  no  Precipitating or alleviating factors:  Aggravating factors include: Using it, " moving his arm, turning his head   Therapies tried and outcome: stretching      He also would like the callouses on his toes pared down again. This has been immensely helpful in the past with reduction of the toe pain. He has needed this done numerous times. Last time was 1 month ago.           Review of Systems   Constitutional, HEENT, cardiovascular, pulmonary, gi and gu systems are negative, except as otherwise noted.      Objective    /80   Pulse 60   Resp 20   Wt 91.2 kg (201 lb)   SpO2 100%   BMI 26.52 kg/m    Body mass index is 26.52 kg/m .    Physical Exam   GENERAL: healthy, alert and no distress  NECK: no adenopathy, no asymmetry, masses, or scars and thyroid normal to palpation  RESP: lungs clear to auscultation - no rales, rhonchi or wheezes  CV: regular rate and rhythm, normal S1 S2, no S3 or S4, no murmur, click or rub, no peripheral edema and peripheral pulses strong  ABDOMEN: soft, nontender, no hepatosplenomegaly, no masses and bowel sounds normal  MS: acute right shoulder pain/ neck pain with significant tenderness to palpation. Muscle spasm noted on exam. Improved with massage, stretching, and sling.   SKIN: bilateral 2nd toes corn/ callous growth with horn. Bottom of foot corn present.

## 2022-02-03 ENCOUNTER — TELEPHONE (OUTPATIENT)
Dept: FAMILY MEDICINE | Facility: CLINIC | Age: 52
End: 2022-02-03
Payer: MEDICARE

## 2022-02-03 DIAGNOSIS — G24.3 SPASMODIC TORTICOLLIS: Primary | ICD-10-CM

## 2022-02-03 RX ORDER — TIZANIDINE HYDROCHLORIDE 4 MG/1
4 CAPSULE, GELATIN COATED ORAL 3 TIMES DAILY
Qty: 20 CAPSULE | Refills: 0 | Status: SHIPPED | OUTPATIENT
Start: 2022-02-03 | End: 2022-02-07

## 2022-02-03 NOTE — TELEPHONE ENCOUNTER
Per fax received from [facility] - Methocarbamol and Diclofenac are not covered by patient's Insurance Company  Mayda - Please choose:  1.  Change medication that is not covered to a different medication and send new prescription to patient's pharmacy?  2.  Patient will need to pay for the non-covered medication out-of-pocket?   3.  Try for Prior Authorization with Insurance Company to get medication covered?        P.A. Phone #:       P.A.  ID#:

## 2022-02-04 ENCOUNTER — ALLIED HEALTH/NURSE VISIT (OUTPATIENT)
Dept: FAMILY MEDICINE | Facility: CLINIC | Age: 52
End: 2022-02-04
Payer: MEDICARE

## 2022-02-04 ENCOUNTER — HOSPITAL ENCOUNTER (OUTPATIENT)
Dept: PHYSICAL THERAPY | Facility: CLINIC | Age: 52
Setting detail: THERAPIES SERIES
End: 2022-02-04
Attending: NURSE PRACTITIONER
Payer: MEDICARE

## 2022-02-04 DIAGNOSIS — G24.3 SPASMODIC TORTICOLLIS: ICD-10-CM

## 2022-02-04 PROCEDURE — 99207 PR NO CHARGE NURSE ONLY: CPT

## 2022-02-04 PROCEDURE — 97161 PT EVAL LOW COMPLEX 20 MIN: CPT | Mod: GP | Performed by: PHYSICAL THERAPIST

## 2022-02-04 PROCEDURE — 97110 THERAPEUTIC EXERCISES: CPT | Mod: GP | Performed by: PHYSICAL THERAPIST

## 2022-02-04 NOTE — PROGRESS NOTES
[facility]    OUTPATIENT PHYSICAL THERAPY ORTHOPEDIC EVALUATION  PLAN OF TREATMENT FOR OUTPATIENT REHABILITATION  (COMPLETE FOR INITIAL CLAIMS ONLY)  Patient's Last Name, First Name, M.I.  YOB: 1970  Ihsan Marcus    Provider s Name:  [facility]   Medical Record No.  [id number]   Start of Care Date:  02/04/22   Onset Date:  11/10/15   Type:     _X__PT   ___OT   ___SLP Medical Diagnosis:  spasmodic torticollis      PT Diagnosis:  right sided neck pain    Visits from SOC:  1      _________________________________________________________________________________  Plan of Treatment/Functional Goals:  joint mobilization,manual therapy,neuromuscular re-education,ROM,strengthening,stretching     Cryotherapy,Hot packs,Traction,Ultrasound     Goals  Goal Identifier: 1  Goal Description: Patient will be independent with HEP for self management after physical therapy.   Target Date: 03/04/22    Goal Identifier: 2  Goal Description: Patient will improve right shoulder ROM to 90 degrees flexion with less than 3/10 pain in order to don/doff t-shirt.   Target Date: 03/04/22    Goal Identifier: 3  Goal Description: Patient will report less than 5/10 pain in the right neck with daily household chores.   Target Date: 04/01/22    Goal Identifier: 4  Goal Description: Patient will be able to extend neck to look up at ceiling to 20 degrees ext in order to look up to scan environment while walking.   Target Date: 04/01/22             Therapy Frequency:  1 time/week  Predicted Duration of Therapy Intervention:  8 weeks    Dahlia Bolton, PT                 I CERTIFY THE NEED FOR THESE SERVICES FURNISHED UNDER        THIS PLAN OF TREATMENT AND WHILE UNDER MY CARE     (Physician co-signature of this document indicates review and certification of the therapy plan).                        Certification Date From:  02/04/22   Certification Date To:  04/01/22    Referring Provider:  Mayda Albarran    Initial Assessment        See Epic Evaluation Start of Care Date: 02/04/22

## 2022-02-04 NOTE — NURSING NOTE
Patient just came from physical therapy. He is in a lot of pain in his right shoulder. He has prescriptions for robaxin, voltaren, and zanaflex at the [facility]. Patient is unable to pick them up and would like to have them sent to another pharmacy. Patient would like to pick them up at [facility] in [place]. Spoke with pharmacist JEISON and he will have the prescriptions transferred and will call patient when they are ready for .  Carmenza Lyon RN
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
## 2022-02-06 ENCOUNTER — HOSPITAL ENCOUNTER (EMERGENCY)
Facility: CLINIC | Age: 52
Discharge: HOME | End: 2022-02-06
Attending: EMERGENCY MEDICINE | Admitting: EMERGENCY MEDICINE
Payer: MEDICARE

## 2022-02-06 ENCOUNTER — APPOINTMENT (OUTPATIENT)
Dept: GENERAL RADIOLOGY | Facility: CLINIC | Age: 52
End: 2022-02-06
Attending: EMERGENCY MEDICINE
Payer: MEDICARE

## 2022-02-06 VITALS
SYSTOLIC BLOOD PRESSURE: 151 MMHG | OXYGEN SATURATION: 100 % | RESPIRATION RATE: 16 BRPM | BODY MASS INDEX: 26.78 KG/M2 | WEIGHT: 203 LBS | TEMPERATURE: 97.3 F | HEART RATE: 63 BPM | DIASTOLIC BLOOD PRESSURE: 97 MMHG

## 2022-02-06 DIAGNOSIS — M25.511 ACUTE PAIN OF RIGHT SHOULDER: ICD-10-CM

## 2022-02-06 PROCEDURE — 73030 X-RAY EXAM OF SHOULDER: CPT | Mod: RT

## 2022-02-06 PROCEDURE — 250N000013 HC RX MED GY IP 250 OP 250 PS 637: Performed by: EMERGENCY MEDICINE

## 2022-02-06 PROCEDURE — 20552 NJX 1/MLT TRIGGER POINT 1/2: CPT | Performed by: EMERGENCY MEDICINE

## 2022-02-06 PROCEDURE — 99284 EMERGENCY DEPT VISIT MOD MDM: CPT | Mod: 25 | Performed by: EMERGENCY MEDICINE

## 2022-02-06 RX ORDER — OXYCODONE HYDROCHLORIDE 5 MG/1
5 TABLET ORAL EVERY 6 HOURS PRN
Qty: 10 TABLET | Refills: 0 | Status: SHIPPED | OUTPATIENT
Start: 2022-02-06 | End: 2022-03-22

## 2022-02-06 RX ORDER — OXYCODONE HYDROCHLORIDE 5 MG/1
5 TABLET ORAL ONCE
Status: COMPLETED | OUTPATIENT
Start: 2022-02-06 | End: 2022-02-06

## 2022-02-06 RX ORDER — METHOCARBAMOL 750 MG/1
750 TABLET, FILM COATED ORAL ONCE
Status: DISCONTINUED | OUTPATIENT
Start: 2022-02-06 | End: 2022-02-06

## 2022-02-06 RX ADMIN — IBUPROFEN 600 MG: 400 TABLET ORAL at 09:53

## 2022-02-06 RX ADMIN — OXYCODONE HYDROCHLORIDE 5 MG: 5 TABLET ORAL at 09:53

## 2022-02-06 NOTE — DISCHARGE INSTRUCTIONS
Do range of motion exercises to help move your shoulder.  You could try massage to help with the pain.  Apply heat or ice for 10 to 15 minutes at a time every 2-3 hours while awake.  Return if you start having fevers, rash, worsening pain, or other concerns.  Follow-up in clinic if not better in 1 week.    Use ibuprofen 400 mg and acetaminophen 650 mg every 6 hours as needed for your symptoms.  Use oxycodone as needed for pain that is not controlled by the prior two medications.    Oxycodone is an addictive opioid medication, please only take it when the pain is more than can be controlled by acetaminophen or ibuprofen alone. It will also make you lightheaded, nauseated, and constipated.  Do not drive, operate heavy machinery, or take care of young children while taking this medication. Do not mix it with other medications or drugs that will make you sleepy, such as alcohol.     Repeated studies have shown that the longer you use opioid pain medications, the longer it is until you return to normal function. It is our recommendation that you taper off opioids as quickly as possible with the goal of returning to normal function or near normal function. Long term use of opioids quickly results in growing tolerance to the medication (less of the benefits) and increased dependence (more of the bad side effects).     Pain is very difficult to treat and we can very rarely take away pain completely. If you are having difficulty with pain over several weeks after an injury, you may need to start different medications and therapies (such as physical therapy, graded exercise, massage, and acupuncture). Please talk about this with your regular doctor.     If you are interested in seeking free, confidential treatment referral and information service for individuals and families facing mental health and/or substance use disorders please call [phone])

## 2022-02-06 NOTE — ED TRIAGE NOTES
Pt was seen in [facility], states that he was prescribed a muscle relaxer, but his pharmacy was unable to deliver medication in time therefore he has not had any.

## 2022-02-06 NOTE — ED PROVIDER NOTES
History     Chief Complaint   Patient presents with     Arm Pain     Medication Refill     HPI  Ihsan Marcus is a 52 year old male who presents for right shoulder pain.  Symptoms ongoing over the past week.  He is complaining of severe sharp pain in the posterior right shoulder and at the base of the neck, hurts to move the neck, hurts to move the shoulder.  Pain shoots down the arm to about the elbow and feels like electrical wires down the arm.  He has been trying acetaminophen for the pain with minimal improvement.  No injuries.  He denies any trauma but says he works as a  and is constantly moving his arms and shoulders.  His pain is making it hard for him to work, he cannot sleep on that side, hard to do his normal activities.  He was seen in clinic and was prescribed methocarbamol, however he says he has not been able to fill this prescription.  She denies chest pain or difficulty breathing.  No fevers or chills or rash.    Allergies:  Allergies   Allergen Reactions     Fish Swelling and Other (See Comments)     Facial swelling and sleepiness     Latex      Other reaction(s): Other (see comments)     Mushroom Hives     Olive Oil Unknown and Other (See Comments)     Peanut (Diagnostic)      Other reaction(s): Edema     Penicillins      Other reaction(s): Other (see comments)     Trazodone      Other reaction(s): Other (see comments)     Fish Allergy      Fish Oil      Olive Oil        Problem List:    Patient Active Problem List    Diagnosis Date Noted     History of Helicobacter pylori infection 02/02/2021     Priority: Medium     Psychophysiological insomnia 10/23/2019     Priority: Medium     Anorexia 10/02/2019     Priority: Medium     Nocturnal enuresis 10/02/2019     Priority: Medium     Polyp of colon 09/23/2019     Priority: Medium     Repeat 8/2021       Candidiasis of esophagus (H) 08/26/2019     Priority: Medium     Gastritis due to Helicobacter species 08/26/2019     Priority: Medium      Constipation 08/06/2019     Priority: Medium     Rectal hemorrhage 08/06/2019     Priority: Medium     Urinary urgency 03/19/2019     Priority: Medium     Verruca plantaris 10/24/2018     Priority: Medium     Pityriasis versicolor 06/25/2018     Priority: Medium     Mild persistent asthma 04/27/2018     Priority: Medium     Transient ischemic attack 01/25/2018     Priority: Medium     Hyperlipidemia 01/23/2018     Priority: Medium     Delusional disorder (H) 01/17/2017     Priority: Medium     Borderline intellectual functioning 01/16/2017     Priority: Medium     Drug induced subacute dyskinesia 01/16/2017     Priority: Medium     Nondependent cannabis abuse 01/16/2017     Priority: Medium     Schizoaffective disorder, bipolar type (H) 01/16/2017     Priority: Medium     Rhinitis, allergic 05/14/2014     Priority: Medium     Schizophrenia (H) 11/19/2013     Priority: Medium     Bipolar I disorder, single manic episode (H) 03/06/2012     Priority: Medium     Bipolar affective disorder, manic       Gastroesophageal reflux disease without esophagitis 03/06/2012     Priority: Medium     Positive reaction to tuberculin skin test 02/24/2010     Priority: Medium     QFT neg.  No tx recommended by MICKY 6/4/2014       Drug dependence, in remission (H) 05/05/2008     Priority: Medium     Formatting of this note might be different from the original.  Overview:   In controlled environment    Overview:   Overview:   In controlled environment    Overview:   Overview:   Overview:   In controlled environment          Past Medical History:    Past Medical History:   Diagnosis Date     Mild persistent asthma 4/27/2018       Past Surgical History:    Past Surgical History:   Procedure Laterality Date     COLONOSCOPY N/A 8/13/2021    Procedure: COLONOSCOPY;  Surgeon: Stewart Monsalve MD;  Location: [facility]       Family History:    Family History   Problem Relation Age of Onset     Diabetes Mother      Coronary Artery Disease Mother       Coronary Artery Disease Father      Cancer No family hx of        Social History:  Marital Status:  Single [1]  Social History     Tobacco Use     Smoking status: Current Every Day Smoker     Packs/day: 0.50     Years: 23.00     Pack years: 11.50     Types: Cigarettes     Smokeless tobacco: Never Used     Tobacco comment: Slowing down a lot   Vaping Use     Vaping Use: Never used   Substance Use Topics     Alcohol use: No     Drug use: No        Medications:    acetaminophen (TYLENOL) 500 MG tablet  aspirin (ASA) 81 MG chewable tablet  calcium polycarbophil (FIBERCON) 625 MG tablet  cetirizine (ZYRTEC) 10 MG tablet  diclofenac (VOLTAREN) 1 % topical gel  famotidine (PEPCID) 20 MG tablet  fluconazole (DIFLUCAN) 150 MG tablet  guaiFENesin (ROBITUSSIN) 100 MG/5ML liquid  melatonin 3 MG tablet  melatonin 3 MG tablet  methocarbamol (ROBAXIN) 750 MG tablet  nicotine (NICODERM CQ) 21 MG/24HR 24 hr patch  OLANZapine (ZYPREXA) 10 MG tablet  OLANZapine (ZYPREXA) 15 MG tablet  rosuvastatin (CRESTOR) 10 MG tablet  salicylic acid (COMPOUND W MAX STRENGTH) 17 % external gel  senna-docusate (SENOKOT-S/PERICOLACE) 8.6-50 MG tablet  tiZANidine (ZANAFLEX) 4 MG capsule          Review of Systems  A 4 point review of systems was performed. All pertinent positives and negatives were listed in the HPI and rest of ROS were otherwise negative.    Physical Exam   BP: (!) 151/97  Pulse: 63  Temp: 97.3  F (36.3  C)  Resp: 16  Weight: 92.1 kg (203 lb)  SpO2: 100 %      Physical Exam  Constitutional:       General: He is not in acute distress.     Appearance: He is well-developed. He is not diaphoretic.   HENT:      Head: Normocephalic and atraumatic.   Eyes:      General: No scleral icterus.  Cardiovascular:      Pulses:           Radial pulses are 2+ on the right side.   Musculoskeletal:      Cervical back: Normal range of motion and neck supple. No crepitus. Pain with movement and muscular tenderness present. No spinous process tenderness.       Comments: Right Shoulder: no deformity, erythema or warmth appreciated; no tenderness over clavicle, AC joint, acromion, scapula, coracoid, or proximal humerus; full passive ROM, but active ROM is limited by pain  Right Hand: Sensation intact at 1st dorsal webspace, thenar eminence, and volar surface of 5th digit.  Motor strength normal for wrist extension, index-thumb opposition, and finger abduction/adduction   Skin:     General: Skin is warm and dry.      Findings: No rash.   Neurological:      Mental Status: He is alert and oriented to person, place, and time.         ED Course                 Procedures  Trigger point injection  Location: Right upper back, base of neck  Indication: pain  Date: 2/6/2022   The patient gave verbal consent for the procedure. Risks and benefits were discussed.  Pre-procedure exam: Motor and sensory exam normal prior to injection. Normal perfusion.  Procedure: A 27 gauge needle was inserted under normal sterile procedure. Approximately 5 mL of 0.5% bupivacaine was injected. The patient tolerated the procedure well without immediate complication. Pain was improved.             Critical Care time:  none               Results for orders placed or performed during the hospital encounter of 02/06/22 (from the past 24 hour(s))   Shoulder XR, 2 view, right    Narrative    EXAM: XR SHOULDER 2 VIEW RIGHT  LOCATION: [facility]  DATE/TIME: 2/6/2022 9:59 AM    INDICATION: posterior shoulder pain  COMPARISON: None.      Impression    IMPRESSION: Normal joint spaces and alignment. No fracture. No degenerative changes.       Medications   ibuprofen (ADVIL/MOTRIN) tablet 600 mg (600 mg Oral Given 2/6/22 0953)   oxyCODONE (ROXICODONE) tablet 5 mg (5 mg Oral Given 2/6/22 0953)       Assessments & Plan (with Medical Decision Making)   52-year-old male who presents for pain at the back of his right shoulder and base of his neck that shoots down his arm.  Vital signs are  reassuring here.  No signs of cellulitis or abscess on exam.  No signs of zoster.  He has normal passive range of motion of the shoulder but it hurts to move himself.  Normal strength in the hand, elbow, and shoulder.  He is given ibuprofen and oxycodone and a trigger point injection is performed as above.  X-ray of the shoulder was obtained, images reviewed independently as well as radiology read reviewed, no signs of fracture or dislocation.  He has some improvement in his pain.  No signs of arterial occlusion.  No signs of DVT.  Likely muscle spasm with some irritation of the nerve that is causing symptoms and he is safe to discharge home with a short course of oxycodone and instructions to do early range of motion exercises, return if worse, otherwise follow-up in clinic if not better in 1 week.  The patient is in agreement to this plan    I have reviewed the nursing notes.    I have reviewed the findings, diagnosis, plan and need for follow up with the patient.       New Prescriptions    No medications on file       Final diagnoses:   Acute pain of right shoulder       2/6/2022   [facility] EMERGENCY DEPT     Dionte Chowdhury MD  02/06/22 1026

## 2022-02-07 ENCOUNTER — PATIENT OUTREACH (OUTPATIENT)
Dept: FAMILY MEDICINE | Facility: CLINIC | Age: 52
End: 2022-02-07
Payer: MEDICARE

## 2022-02-07 DIAGNOSIS — G24.3 SPASMODIC TORTICOLLIS: ICD-10-CM

## 2022-02-07 NOTE — TELEPHONE ENCOUNTER
"ED/Discharge Protocol    \"Hi, my name is Cristina Cerda, a registered nurse, and I am calling on behalf of Dr. Myers office at [facility].  I am calling to follow up and see how things are going for you after your recent visit.\"    \"I see that you were in the (ER/UC/IP) on 2/6/22.    How are you doing now that you are home?\" yes    Is patient experiencing symptoms that may require a hospital visit?  No. But does have some burning in right nostril and arm is aching with pain.     Discharge Instructions    \"Let's review your discharge instructions.  What is/are the follow-up recommendations?  Pt. Response: should see my PCP.    \"Were you instructed to make a follow-up appointment?\"  Pt. Response: Yes.  Has appointment been made?   No.  \"Can I help you schedule that appointment?\" Yes  2/8/22 at     \"When you see the provider, I would recommend that you bring your discharge instructions with you.    Medications    \"How many new medications are you on since your hospitalization/ED visit?\"    0-1  \"How many of your current medicines changed (dose, timing, name, etc.) while you were in the hospital/ED visit?\"   0-1; none  \"Do you have questions about your medications?\"   Yes (and cannot be easily answered) - Epic MTM referral needed  \"Were you newly diagnosed with heart failure, COPD, diabetes or did you have a heart attack?\"   No. Hx: of 2 heart attacks and 3 strokes  For patients on insulin: \"Did you start on insulin in the hospital or did you have your insulin dose changed?\"   No  Post Discharge Medication Reconciliation Status: patient was not discharged from an inpatient facility.    Was MTM referral placed (*Make sure to put transitions as reason for referral)?   no    Call Summary    \"Do you have any questions or concerns about your condition or care plan at the moment?\"    Yes pain in the shoulder, burning in the right nose  Triage nurse advice given: no    Patient was in ER 1 in the past year (assess " "appropriateness of ER visits.)      \"If you have questions or things don't continue to improve, we encourage you contact us through the main clinic number,  [phone].  Even if the clinic is not open, triage nurses are available 24/7 to help you.     We would like you to know that our clinic has extended hours (provide information).  We also have urgent care (provide details on closest location and hours/contact info)\"      \"Thank you for your time and take care!\"    Cristina Cerad RN BSN          "

## 2022-02-07 NOTE — TELEPHONE ENCOUNTER
[facility] called - They want to know if the Tizanidine Rx was to replace BOTH Methocarbamol and Diclofenac gel?  Please call pharmacy and advise.

## 2022-02-07 NOTE — TELEPHONE ENCOUNTER
Routing to provider to review and advise.     Patient wanting the Tizanidine sent to the [facility] pharmacy in stead of the one in [place]. Provider to approve.   -If approved staff will need to call the [place] pharmacy to cancel that prescription with them.     Patient also has an appointment tomorrow 2/8/22 at 2:20pm with Provider Mayda Albarran for a ED follow-up    Route back to  Provider careteam pool, not to sender.    Cristina Cerda RN BSN

## 2022-02-08 ENCOUNTER — OFFICE VISIT (OUTPATIENT)
Dept: FAMILY MEDICINE | Facility: CLINIC | Age: 52
End: 2022-02-08
Payer: MEDICARE

## 2022-02-08 VITALS
HEIGHT: 73 IN | SYSTOLIC BLOOD PRESSURE: 144 MMHG | OXYGEN SATURATION: 97 % | RESPIRATION RATE: 14 BRPM | BODY MASS INDEX: 26.37 KG/M2 | DIASTOLIC BLOOD PRESSURE: 82 MMHG | HEART RATE: 66 BPM | WEIGHT: 199 LBS | TEMPERATURE: 97.3 F

## 2022-02-08 DIAGNOSIS — M54.2 NECK PAIN: ICD-10-CM

## 2022-02-08 DIAGNOSIS — F19.21 DRUG DEPENDENCE, IN REMISSION (H): ICD-10-CM

## 2022-02-08 DIAGNOSIS — R20.2 ARM PARESTHESIA, LEFT: ICD-10-CM

## 2022-02-08 DIAGNOSIS — G24.3 SPASMODIC TORTICOLLIS: Primary | ICD-10-CM

## 2022-02-08 DIAGNOSIS — F20.9 SCHIZOPHRENIA, UNSPECIFIED TYPE (H): ICD-10-CM

## 2022-02-08 PROCEDURE — 99214 OFFICE O/P EST MOD 30 MIN: CPT | Performed by: NURSE PRACTITIONER

## 2022-02-08 RX ORDER — IBUPROFEN 600 MG/1
600 TABLET, FILM COATED ORAL EVERY 6 HOURS PRN
Qty: 90 TABLET | Refills: 0 | Status: ON HOLD | OUTPATIENT
Start: 2022-02-08 | End: 2022-05-07

## 2022-02-08 RX ORDER — TIZANIDINE HYDROCHLORIDE 4 MG/1
4 CAPSULE, GELATIN COATED ORAL 3 TIMES DAILY
Qty: 20 CAPSULE | Refills: 0 | Status: SHIPPED | OUTPATIENT
Start: 2022-02-08 | End: 2022-02-18

## 2022-02-08 RX ORDER — CAMPHOR, MENTHOL 11; 16 G/100ML; G/100ML
2 SPRAY TOPICAL
Qty: 56 G | Refills: 0 | Status: SHIPPED | OUTPATIENT
Start: 2022-02-08 | End: 2023-08-30

## 2022-02-08 ASSESSMENT — MIFFLIN-ST. JEOR: SCORE: 1811.54

## 2022-02-08 ASSESSMENT — PAIN SCALES - GENERAL: PAINLEVEL: EXTREME PAIN (8)

## 2022-02-08 NOTE — PROGRESS NOTES
"  Assessment & Plan     Spasmodic torticollis  Ongoing acute symptoms present. Will start some ibuprofen. [facility]. In additional may require some PT. Follow up in 1 week. Patient had trigger point injection 2 days ago.   - ibuprofen (ADVIL/MOTRIN) 600 MG tablet; Take 1 tablet (600 mg) by mouth every 6 hours as needed for moderate pain    Neck pain  See above.     Arm paresthesia, left  Due to nerves being pinched from acute muscle spasm.     Schizophrenia, unspecified type (H)  Stable currently on prescribed medication. Has a psychiatrist who manages medications.     Drug dependence, in remission (H)  Stable doing well.                BMI:   Estimated body mass index is 26.25 kg/m  as calculated from the following:    Height as of this encounter: 1.854 m (6' 1\").    Weight as of this encounter: 90.3 kg (199 lb).           Return in about 3 days (around 2/11/2022) for ongoing symptoms if not improving.    TIERRA Dickens CNP  [facility]   Ihsan is a 52 year old who presents for the following health issues     HPI     ED/UC Followup:    Facility:  [facility] Emergency Dept.  Date of visit: 2/6/22  Reason for visit: Acute pain of right shoulder  Current Status: still in a lot of pain     Trigger point injection done in the ER on 2/6/22. Continues to have symptoms and is bothered by the arm pain. Imaging was completed without significant findings. Pain continues to be present and severe. Having a hard time sleeping. He is fearful of the narcotic as it can be addictive. He needs a prescription for NSAIDs due to group home rules/ regulations. He reports symptoms are the same. No new symptoms are present.     He has continued to be sober. He has schizophrenia which is well controlled currently and follows with psychiatry.     Review of Systems   Constitutional, HEENT, cardiovascular, pulmonary, gi and gu systems are negative, " "except as otherwise noted.      Objective    BP (!) 144/82 (BP Location: Right arm, Patient Position: Chair, Cuff Size: Adult Regular)   Pulse 66   Temp 97.3  F (36.3  C) (Tympanic)   Resp 14   Ht 1.854 m (6' 1\")   Wt 90.3 kg (199 lb)   SpO2 97%   BMI 26.25 kg/m    Body mass index is 26.25 kg/m .  Physical Exam   GENERAL: healthy, alert and no distress  NECK: no adenopathy, no asymmetry, masses, or scars and thyroid normal to palpation  MS: acute muscle spasm of the right upper shoulder and neck. ROM mildly limited due to pain. Pain worsens with massage to the area.     "

## 2022-02-08 NOTE — TELEPHONE ENCOUNTER
Sent in icy/ hot if he can have the voltaren covered this would be helpful for him.   TIERRA Dickens CNP

## 2022-02-08 NOTE — TELEPHONE ENCOUNTER
Okay to cxl methocarbanol, the diclofenac gel is available OTC if patient wants it. He has an appointment with me today for follow up.   I can discuss the diclofenac with the patient further.   TIERRA Dickens CNP

## 2022-02-08 NOTE — TELEPHONE ENCOUNTER
After appointment pharmacy will need to know if the diclofenic will be canceled or not because the patient is in a group home and outside meds are not allowed, and lives on assistance and doesn't have the money for OTC.     Cristina Cerda RN BSN

## 2022-02-10 NOTE — PROGRESS NOTES
[facility] - Copy to scanning

## 2022-02-17 DIAGNOSIS — G45.9 TRANSIENT ISCHEMIC ATTACK: ICD-10-CM

## 2022-02-17 RX ORDER — ASPIRIN 81 MG/1
81 TABLET, CHEWABLE ORAL DAILY
Qty: 90 TABLET | Refills: 3 | Status: ON HOLD | OUTPATIENT
Start: 2022-02-17 | End: 2022-05-07

## 2022-02-17 NOTE — TELEPHONE ENCOUNTER
Prescription approved per [facility] Refill Protocol.    Pending Prescriptions:                       Disp   Refills    aspirin (ASA) 81 MG chewable tablet       90 tab*3            Sig: Take 1 tablet (81 mg) by mouth daily    Carolyn Garcia RN on 2/17/2022 at 10:37 AM

## 2022-02-17 NOTE — TELEPHONE ENCOUNTER
Laney casiano from [facility]:  They have been faxing for refills of 81 mg ASA and have not got response.    Will refill/send    Carolyn Garcia RN on 2/17/2022 at 10:34 AM

## 2022-02-18 ENCOUNTER — OFFICE VISIT (OUTPATIENT)
Dept: FAMILY MEDICINE | Facility: CLINIC | Age: 52
End: 2022-02-18
Payer: MEDICARE

## 2022-02-18 VITALS
HEART RATE: 70 BPM | RESPIRATION RATE: 14 BRPM | WEIGHT: 203 LBS | OXYGEN SATURATION: 100 % | SYSTOLIC BLOOD PRESSURE: 130 MMHG | HEIGHT: 73 IN | DIASTOLIC BLOOD PRESSURE: 84 MMHG | TEMPERATURE: 97.5 F | BODY MASS INDEX: 26.9 KG/M2

## 2022-02-18 DIAGNOSIS — R20.2 ARM PARESTHESIA, LEFT: ICD-10-CM

## 2022-02-18 DIAGNOSIS — G24.3 SPASMODIC TORTICOLLIS: Primary | ICD-10-CM

## 2022-02-18 PROCEDURE — 99214 OFFICE O/P EST MOD 30 MIN: CPT | Performed by: NURSE PRACTITIONER

## 2022-02-18 RX ORDER — METHYLPREDNISOLONE 4 MG
TABLET, DOSE PACK ORAL
Qty: 21 TABLET | Refills: 0 | Status: SHIPPED | OUTPATIENT
Start: 2022-02-18 | End: 2022-03-22

## 2022-02-18 RX ORDER — TIZANIDINE HYDROCHLORIDE 4 MG/1
4 CAPSULE, GELATIN COATED ORAL 3 TIMES DAILY
Qty: 20 CAPSULE | Refills: 0 | Status: SHIPPED | OUTPATIENT
Start: 2022-02-18 | End: 2022-06-14

## 2022-02-18 ASSESSMENT — PAIN SCALES - GENERAL: PAINLEVEL: EXTREME PAIN (8)

## 2022-02-18 NOTE — PATIENT INSTRUCTIONS
Follow up in 1 week if symptoms are persistent. Start prednisone and takke tizanidine every 8 hours as needed for muscle spasm.   Okay to use NSAIDs and Acetaminophen for pain.   ICE / HEAT as tolerated for management of symptoms.     Continue with PT.

## 2022-02-18 NOTE — PROGRESS NOTES
"  Assessment & Plan       ICD-10-CM    1. Spasmodic torticollis  G24.3 tiZANidine (ZANAFLEX) 4 MG capsule     methylPREDNISolone (MEDROL DOSEPAK) 4 MG tablet therapy pack   2. Arm paresthesia, left  R20.2 methylPREDNISolone (MEDROL DOSEPAK) 4 MG tablet therapy pack     Refilled muscle accident today with instructions to take this every 8 hours for muscle spasm as needed.  In addition we will try Medrol Dosepak.  If symptoms are not improving follow-up in 1 week.     BMI:   Estimated body mass index is 26.78 kg/m  as calculated from the following:    Height as of this encounter: 1.854 m (6' 1\").    Weight as of this encounter: 92.1 kg (203 lb).           Return in about 1 week (around 2/25/2022) for ongoing symptoms if not improving.    TIERRA Dickens CNP  [facility]   Ihsan is a 52 year old who presents for the following health issues     History of Present Illness     Reason for visit:  Shoulder pain  Symptom onset:  3-4 weeks ago    He eats 2-3 servings of fruits and vegetables daily.He consumes 5 sweetened beverage(s) daily.He exercises with enough effort to increase his heart rate 10 to 19 minutes per day.  He exercises with enough effort to increase his heart rate 3 or less days per week.   He is taking medications regularly.     Trigger point injection done in the ER on 2/6/22.  He seen me on 2/8.  I had reviewed ER notes at that time.  He comes in today with continued muscle spasm of the left upper shoulder and radiating into the neck.  Limited range of motion of the neck.  Pain continues to be present and severe. Having a hard time sleeping due to pain.  Continues to have numbness and tingling feeling into the arm.  He denies any other systemic symptoms.  Paperwork to be signed for group home.  Review of Systems   Constitutional, HEENT, cardiovascular, pulmonary, gi and gu systems are negative, except as otherwise noted.      Objective    /84 (BP " "Location: Right arm, Patient Position: Chair, Cuff Size: Adult Large)   Pulse 70   Temp 97.5  F (36.4  C) (Tympanic)   Resp 14   Ht 1.854 m (6' 1\")   Wt 92.1 kg (203 lb)   SpO2 100%   BMI 26.78 kg/m    Body mass index is 26.78 kg/m .     Physical Exam   GENERAL: healthy, alert and no distress  NECK: no adenopathy, no asymmetry, masses, or scars and thyroid normal to palpation  MS: acute muscle spasm of the right upper shoulder and neck. ROM mildly limited due to pain. Pain worsens with massage to the area.                   "

## 2022-03-21 ENCOUNTER — DOCUMENTATION ONLY (OUTPATIENT)
Dept: OTHER | Facility: CLINIC | Age: 52
End: 2022-03-21
Payer: MEDICARE

## 2022-03-21 NOTE — PROGRESS NOTES
Assessment/Plan:    Neck pain  Cervical radiculopathy  Pt seems to have has issues with neck pain for quite some time but now having issues with shoulder pain and radiculopathy symptoms. He has had XR in the past that were normal. No reported injury/trauma. Not responding to trigger point injection, ibuprofen, tizanidine. Given symptoms recommend evaluation with MRI and will trial gabapentin for radiculopathy symptoms (start at 100mg TID but could increase dose if no side effects). If MRI normal then would consider EMG +/- PT.  - MR Cervical Spine w/o Contrast  - MR Shoulder Right w/o Contrast  - gabapentin (NEURONTIN) 100 MG capsule  Dispense: 90 capsule; Refill: 0       Follow up: as needed    Lala Payan MD  [facility]    Subjective:   Ihsan Marcus is a 52 year old male is here today for shoulder pain    -R shoulder pain: 2/6 ER visit for this issue - trigger pain injection done, ibuprofen and oxycodone prescribed, XR nml   -feels like arm is weak/paralyzed - significant pain and tingling in arms  -no fall/injury  -is taking tizanidine 4mg TID and ibuprofen 600mg  -note from SW regarding ordering neuropsych evaluation - this was brought up with pt and he became quite upset stating he already had this completed and that he doesn't know about this so doesn't want to get it done    Answers for HPI/ROS submitted by the patient on 3/22/2022  How many servings of fruits and vegetables do you eat daily?: 4 or more  On average, how many sweetened beverages do you drink each day (Examples: soda, juice, sweet tea, etc.  Do NOT count diet or artificially sweetened beverages)?: 4  How many minutes a day do you exercise enough to make your heart beat faster?: 60 or more  How many days a week do you exercise enough to make your heart beat faster?: 5  How many days per week do you miss taking your medication?: 7  What is the reason for your visit today?: Shoulder Pain  When did your symptoms begin?: 3-4 weeks  ago  What are your symptoms?: Shoulder pain  How would you describe these symptoms?: Moderate  Are your symptoms:: Improving  Have you had these symptoms before?: Yes  Have you tried or received treatment for these symptoms before?: Yes  Did that treatment work? : Yes  Please describe the treatment you had:: Given ibuprofen 600mg for relief  Is there anything that makes you feel worse?: When sleeping  Is there anything that makes you feel better?: A little relief with the medication    Patient Active Problem List   Diagnosis     Schizophrenia (H)     Positive reaction to tuberculin skin test     Anorexia     Bipolar I disorder, single manic episode (H)     Borderline intellectual functioning     Candidiasis of esophagus (H)     Constipation     Delusional disorder (H)     Drug dependence, in remission (H)     Drug induced subacute dyskinesia     Gastritis due to Helicobacter species     Gastroesophageal reflux disease without esophagitis     Hyperlipidemia     Mild persistent asthma     Nocturnal enuresis     Nondependent cannabis abuse     Pityriasis versicolor     Polyp of colon     Psychophysiological insomnia     Rectal hemorrhage     Rhinitis, allergic     Schizoaffective disorder, bipolar type (H)     Transient ischemic attack     Urinary urgency     Verruca plantaris     History of Helicobacter pylori infection     Past Medical History:   Diagnosis Date     Mild persistent asthma 4/27/2018     Past Surgical History:   Procedure Laterality Date     COLONOSCOPY N/A 8/13/2021    Procedure: COLONOSCOPY;  Surgeon: Stewart Monsalve MD;  Location: [facility]     Current Outpatient Medications   Medication     acetaminophen (TYLENOL) 500 MG tablet     aspirin (ASA) 81 MG chewable tablet     calcium polycarbophil (FIBERCON) 625 MG tablet     cetirizine (ZYRTEC) 10 MG tablet     diclofenac (VOLTAREN) 1 % topical gel     famotidine (PEPCID) 20 MG tablet     fluconazole (DIFLUCAN) 150 MG tablet     gabapentin (NEURONTIN) 100 MG  capsule     guaiFENesin (ROBITUSSIN) 100 MG/5ML liquid     ibuprofen (ADVIL/MOTRIN) 600 MG tablet     melatonin 3 MG tablet     Menthol-Camphor (ICY HOT ADVANCED PAIN RELIEF) 16-11 % CREA     OLANZapine (ZYPREXA) 10 MG tablet     rosuvastatin (CRESTOR) 10 MG tablet     salicylic acid (COMPOUND W MAX STRENGTH) 17 % external gel     senna-docusate (SENOKOT-S/PERICOLACE) 8.6-50 MG tablet     tiZANidine (ZANAFLEX) 4 MG capsule     No current facility-administered medications for this visit.     Allergies   Allergen Reactions     Fish Swelling and Other (See Comments)     Facial swelling and sleepiness     Latex      Other reaction(s): Other (see comments)     Mushroom Hives     Olive Oil Unknown and Other (See Comments)     Peanut (Diagnostic)      Other reaction(s): Edema     Penicillins      Other reaction(s): Other (see comments)     Trazodone      Other reaction(s): Other (see comments)     Social History     Socioeconomic History     Marital status: Single     Spouse name: Not on file     Number of children: Not on file     Years of education: Not on file     Highest education level: Not on file   Occupational History     Not on file   Tobacco Use     Smoking status: Current Every Day Smoker     Packs/day: 0.50     Years: 23.00     Pack years: 11.50     Types: Cigarettes     Smokeless tobacco: Never Used     Tobacco comment: Slowing down a lot   Vaping Use     Vaping Use: Never used   Substance and Sexual Activity     Alcohol use: No     Drug use: No     Sexual activity: Yes   Other Topics Concern     Not on file   Social History Narrative     Not on file     Social Determinants of Health     Financial Resource Strain: Not on file   Food Insecurity: Not on file   Transportation Needs: Not on file   Physical Activity: Not on file   Stress: Not on file   Social Connections: Not on file   Intimate Partner Violence: Not on file   Housing Stability: Not on file     Family History   Problem Relation Age of Onset      Diabetes Mother      Coronary Artery Disease Mother      Coronary Artery Disease Father      Cancer No family hx of      Review of systems is as stated in HPI, and the remainder of system review is otherwise negative.    Objective:     BP (!) 156/92 (BP Location: Right arm, Patient Position: Sitting, Cuff Size: Adult Regular)   Wt 87.3 kg (192 lb 8 oz)   BMI 25.40 kg/m      General appearance: awake, NAD  HEENT: atraumatic, normocephalic, no scleral icterus or injection  Lungs: breathing comfortably on room air  Neck: neck primarily in R trapezius area, very tender  Extremities: moving all extremities, R shoulder ROM limited 2/2 pain  Neuro: alert, CNs grossly intact, no focal deficits appreciated  Psych: normal mood/affect/behavior, answering questions appropriately, here with baby doll wrapped in towel - asked me to put bandaid on her arm as it was injured

## 2022-03-22 ENCOUNTER — TELEPHONE (OUTPATIENT)
Dept: FAMILY MEDICINE | Facility: CLINIC | Age: 52
End: 2022-03-22

## 2022-03-22 ENCOUNTER — OFFICE VISIT (OUTPATIENT)
Dept: FAMILY MEDICINE | Facility: CLINIC | Age: 52
End: 2022-03-22
Payer: MEDICARE

## 2022-03-22 VITALS — WEIGHT: 192.5 LBS | SYSTOLIC BLOOD PRESSURE: 156 MMHG | BODY MASS INDEX: 25.4 KG/M2 | DIASTOLIC BLOOD PRESSURE: 92 MMHG

## 2022-03-22 DIAGNOSIS — M54.12 CERVICAL RADICULOPATHY: ICD-10-CM

## 2022-03-22 DIAGNOSIS — M54.2 NECK PAIN: Primary | ICD-10-CM

## 2022-03-22 DIAGNOSIS — M54.2 NECK PAIN: ICD-10-CM

## 2022-03-22 PROCEDURE — 99214 OFFICE O/P EST MOD 30 MIN: CPT | Performed by: FAMILY MEDICINE

## 2022-03-22 RX ORDER — GABAPENTIN 100 MG/1
100 CAPSULE ORAL 3 TIMES DAILY
Qty: 90 CAPSULE | Refills: 0 | Status: SHIPPED | OUTPATIENT
Start: 2022-03-22 | End: 2022-03-22

## 2022-03-22 RX ORDER — GABAPENTIN 100 MG/1
100 CAPSULE ORAL 3 TIMES DAILY PRN
Qty: 90 CAPSULE | Refills: 0 | Status: SHIPPED | OUTPATIENT
Start: 2022-03-22 | End: 2023-08-11

## 2022-03-22 NOTE — TELEPHONE ENCOUNTER
Pharmacy is calling stating that the bridge orders do not match up with the rx electronically sent to pharmacy. Pharmacist states that the instruction needs to be very specific otherwise they cant accept it and staff wont be able to administer.     Please advise

## 2022-03-22 NOTE — TELEPHONE ENCOUNTER
Terrence Champagne calling requesting a call from   Dr Payan or assistant, wanting to give background information on patient. Would not go into detail with me,   Stephanie [phone]

## 2022-03-22 NOTE — LETTER
March 22, 2022      Ihsan Marcus  [address] [address]        To Whom It May Concern:    Ihsan Marcus was seen in our clinic. He may return to work with the following: no lifting over 5 lbs, limit tasks that require reaching overhead - please abide by these restrictions for the next 2 week and then we will reassess how Ihsan is doing.      Sincerely,        Lala Payan MD

## 2022-04-01 ENCOUNTER — DOCUMENTATION ONLY (OUTPATIENT)
Dept: OTHER | Facility: CLINIC | Age: 52
End: 2022-04-01
Payer: MEDICARE

## 2022-04-01 ENCOUNTER — TELEPHONE (OUTPATIENT)
Dept: FAMILY MEDICINE | Facility: CLINIC | Age: 52
End: 2022-04-01
Payer: MEDICARE

## 2022-04-01 DIAGNOSIS — R41.83 BORDERLINE INTELLECTUAL FUNCTIONING: ICD-10-CM

## 2022-04-01 DIAGNOSIS — G24.01 DRUG INDUCED SUBACUTE DYSKINESIA: Primary | ICD-10-CM

## 2022-04-01 DIAGNOSIS — F30.9 BIPOLAR I DISORDER, SINGLE MANIC EPISODE (H): ICD-10-CM

## 2022-04-01 DIAGNOSIS — F20.9 SCHIZOPHRENIA, UNSPECIFIED TYPE (H): ICD-10-CM

## 2022-04-01 DIAGNOSIS — F25.0 SCHIZOAFFECTIVE DISORDER, BIPOLAR TYPE (H): ICD-10-CM

## 2022-04-01 DIAGNOSIS — F51.04 PSYCHOPHYSIOLOGICAL INSOMNIA: ICD-10-CM

## 2022-04-01 NOTE — TELEPHONE ENCOUNTER
Reason for Call:  ZHOU     Detailed comments:  Stephanie at [facility] has some questions and would like to a nurse, also would like referral to neuropsych.     Phone Number Patient can be reached at: [phone]    Can we leave a detailed message on this number?  Yes    Call taken on 4/1/2022 at 10:00 AM by Danelle Asencio

## 2022-04-01 NOTE — TELEPHONE ENCOUNTER
Stephanie at [facility] called to say that pt is saying he is his own guardian and doesn't want staff to come into appointments with him. Pt IS NOT his own guardian, Stephanie recommends provider see pt to give him a sense of independence and then have group home staff discuss further with provider. She is also requesting a referral to neuropsych for   assessment and to see i if he is capable of being his own guardian, he hasn't seen neuropsych for a while.   Anna Mei RN

## 2022-04-01 NOTE — TELEPHONE ENCOUNTER
Orders placed for neuropsych.  Let contact know that whenever patient is seen in clinic staff have always remained in the lobby.  They are more than welcome to come back with patient and then step back out for independence however they will need to come back with patient for communication.    Mayda Albarran, TIERRA CNP

## 2022-04-08 ENCOUNTER — NURSE TRIAGE (OUTPATIENT)
Dept: NURSING | Facility: CLINIC | Age: 52
End: 2022-04-08
Payer: MEDICARE

## 2022-04-08 NOTE — TELEPHONE ENCOUNTER
"  Nurse Triage SBAR    Is this a 2nd Level Triage? YES, LICENSED PRACTITIONER REVIEW IS REQUIRED    Situation: Patient calling about wound on right thumb he said he sustained from work and initially presented as a \"callus.\"      Background: Saw Dr. Payan on 3-22-22 for neck pain and intends to continue seeing provider       Assessment: Around 4-2-22 it opened about an inch long and is green, yellow, with pus drainage and black tissue and blisters in the wound.  Denies fever.      Protocol Recommended Disposition:   Go To ED/UCC Now (Or To Office With PCP Approval), See More Appropriate Protocol    Recommendation: Patient should be seen today.  Can patient be seen in clinic?      Spoke with provider care team at clinic    Per Dr. Payan via [employer] patient should be seen in UC or [facility] today.    Provider Recommendation Follow Up:   Reached patient/caregiver. Informed of provider's recommendations. Patient verbalized understanding and agrees with the plan.     Mere Becerra RN  [facility]        Does the patient meet one of the following criteria for ADS visit consideration? 16+ years old, with an [facility] PCP     TIP  Providers, please consider if this condition is appropriate for management at one of our Acute and Diagnostic Services sites.     If patient is a good candidate, please use dotphrase <dot>triageresponse and select Refer to ADS to document.      Reason for Disposition    Wound looks infected    Black (necrotic) or blisters develop in wound    Additional Information    Negative: Cut on the neck, chest, back, or abdomen that may go deep (e.g., stab wound or other suspicious penetrating injury)    Negative: Major bleeding (actively dripping or spurting) that can't be stopped    Negative: Amputation    Negative: Shock suspected (e.g., cold/pale/clammy skin, too weak to stand, low BP, rapid pulse)    Negative: Sounds like a life-threatening emergency to the triager    Negative: Animal bite and " broken skin    Negative: Injury is a puncture wound    Negative: Splinter in the skin    Negative: Stitches and not infected    Negative: Surgical wound infection suspected (post-op)    Negative: Bright red, wide-spread, sunburn-like rash    Protocols used: SKIN INJURY-A-OH, WOUND INFECTION-A-OH

## 2022-04-11 ENCOUNTER — TELEPHONE (OUTPATIENT)
Dept: FAMILY MEDICINE | Facility: CLINIC | Age: 52
End: 2022-04-11

## 2022-04-11 ENCOUNTER — OFFICE VISIT (OUTPATIENT)
Dept: FAMILY MEDICINE | Facility: CLINIC | Age: 52
End: 2022-04-11
Payer: MEDICARE

## 2022-04-11 VITALS
DIASTOLIC BLOOD PRESSURE: 82 MMHG | OXYGEN SATURATION: 98 % | SYSTOLIC BLOOD PRESSURE: 126 MMHG | WEIGHT: 183.4 LBS | BODY MASS INDEX: 24.2 KG/M2 | HEART RATE: 76 BPM

## 2022-04-11 DIAGNOSIS — F25.0 SCHIZOAFFECTIVE DISORDER, BIPOLAR TYPE (H): ICD-10-CM

## 2022-04-11 DIAGNOSIS — S61.001A OPEN WOUND OF RIGHT THUMB, INITIAL ENCOUNTER: ICD-10-CM

## 2022-04-11 DIAGNOSIS — B36.0 TINEA VERSICOLOR: Primary | ICD-10-CM

## 2022-04-11 PROCEDURE — 99213 OFFICE O/P EST LOW 20 MIN: CPT | Performed by: STUDENT IN AN ORGANIZED HEALTH CARE EDUCATION/TRAINING PROGRAM

## 2022-04-11 RX ORDER — KETOCONAZOLE 20 MG/ML
SHAMPOO TOPICAL DAILY PRN
Qty: 100 ML | Refills: 1 | Status: SHIPPED | OUTPATIENT
Start: 2022-04-11 | End: 2022-06-14

## 2022-04-11 NOTE — PROGRESS NOTES
"  Assessment and Plan     52-year-old male with possible history of schizoaffective disorder bipolar type who presents with concern for wound on his thumb as well as rash over his chest back and arms that have been there chronically.  Wound on his thumb does appear to likely have been related to some sort of blister that he open.  Is now scabbing and healing well.  No concerns of infection.  We will have my nurse cover with bandage and given extra supplies to do this at his group home.  Likely staff can assist him.  I think rash is consistent with tinea versicolor.  He states that a biopsy was done which showed it was not this and was \"a worm in his stomach\".  Do not see record of this.  I recommend he follow-up with the provider who told him this but for now I think this is more a manifestation of his schizoaffective.  I recommended ketoconazole shampoo for the tinea versicolor.  Likely difficult to improve but would be hesitant to give him oral antifungals for this.  Thus we will just give him ketoconazole shampoo to use for now    1. Tinea versicolor  - ketoconazole (NIZORAL) 2 % external shampoo; Apply topically daily as needed for itching or irritation  Dispense: 100 mL; Refill: 1    2. Open wound of right thumb, initial encounter    3. Schizoaffective disorder, bipolar type (H)    Follow up: PRN  Options for treatment and follow-up care were reviewed with the patient and/or guardian. Ihsan aMrcus and/or guardian engaged in the decision making process and verbalized understanding of the options discussed and agreed with the final plan.    Dr. Praneeth Etienne         HPI:   Ihsan Marcus is a 52 year old  male who presents for:    Chief Complaint   Patient presents with     left infected thumb     Derm Problem     Rash on body on front and back. Wondering if there is a worm in his body. Wanting a blood test. Being forced to take meds at group home      52-year-old male with past medical history of schizoaffective " "disorder, bipolar 1 disorder who presents with odd concerns today.  His first concern is a wound on his right thumb.  States this started after he popped \"a blood blister\".  Tells me he got the blood blister from his thumb being pinched in a door.  He was worried it was infected as it had some yellow/clear drainage from the area.  Now no longer draining.  History is very difficult as he is a poor historian and makes illogical comments at times.    He also has concern of a rash on his chest and back.  Tells me this has been there for many years.  He has tried various treatments including creams and a pill though he cannot tell me the specifics.  Tells me he saw a provider that told him it was not tinea versicolor and that he has some sort of \"worm in his stomach\".         PMHX:     Patient Active Problem List   Diagnosis     Schizophrenia (H)     Positive reaction to tuberculin skin test     Anorexia     Bipolar I disorder, single manic episode (H)     Borderline intellectual functioning     Candidiasis of esophagus (H)     Constipation     Delusional disorder (H)     Drug dependence, in remission (H)     Drug induced subacute dyskinesia     Gastritis due to Helicobacter species     Gastroesophageal reflux disease without esophagitis     Hyperlipidemia     Mild persistent asthma     Nocturnal enuresis     Nondependent cannabis abuse     Pityriasis versicolor     Polyp of colon     Psychophysiological insomnia     Rectal hemorrhage     Rhinitis, allergic     Schizoaffective disorder, bipolar type (H)     Transient ischemic attack     Urinary urgency     Verruca plantaris     History of Helicobacter pylori infection       Current Outpatient Medications   Medication Sig Dispense Refill     acetaminophen (TYLENOL) 500 MG tablet Take 1 tablet (500 mg) by mouth every 4 hours as needed for mild pain 180 tablet 1     aspirin (ASA) 81 MG chewable tablet Take 1 tablet (81 mg) by mouth daily 90 tablet 3     calcium " polycarbophil (FIBERCON) 625 MG tablet Take 2 tablets (1,250 mg) by mouth daily 60 tablet 11     cetirizine (ZYRTEC) 10 MG tablet Take 1 tablet (10 mg) by mouth daily 30 tablet 11     diclofenac (VOLTAREN) 1 % topical gel Apply 4 g topically 4 times daily As needed for pain in the neck and upper right arm. 150 g 1     famotidine (PEPCID) 20 MG tablet Take 1 tablet (20 mg) by mouth 2 times daily as needed (for symptoms of reflux) 180 tablet 3     gabapentin (NEURONTIN) 100 MG capsule Take 1 capsule (100 mg) by mouth 3 times daily as needed for neuropathic pain 90 capsule 0     guaiFENesin (ROBITUSSIN) 100 MG/5ML liquid Take 10 mLs (200 mg) by mouth every 4 hours as needed for cough 473 mL 3     ibuprofen (ADVIL/MOTRIN) 600 MG tablet Take 1 tablet (600 mg) by mouth every 6 hours as needed for moderate pain 90 tablet 0     melatonin 3 MG tablet Take 6 mg by mouth nightly as needed        Menthol-Camphor (ICY HOT ADVANCED PAIN RELIEF) 16-11 % CREA Externally apply 2 g topically 5 x daily PRN (for pain on shoulder for muscle spasm and joint pain) 56 g 0     OLANZapine (ZYPREXA) 10 MG tablet TAKE ONE AND ONE HALF TABLETS BY MOUTH EVERY DAY AS NEEDED FOR ANXIETY, AGITATION, SLEEP       rosuvastatin (CRESTOR) 10 MG tablet TAKE ONE TABLET BY MOUTH EVERY DAY 90 tablet 3     salicylic acid (COMPOUND W MAX STRENGTH) 17 % external gel Apply topically daily TO LESIONS ON FEET UNTIL CLEAR. 7 g 1     senna-docusate (SENOKOT-S/PERICOLACE) 8.6-50 MG tablet Take 1 tablet by mouth daily as needed for constipation 90 tablet 2     tiZANidine (ZANAFLEX) 4 MG capsule Take 1 capsule (4 mg) by mouth 3 times daily As needed for muscle spasm 20 capsule 0     fluconazole (DIFLUCAN) 150 MG tablet Take 2 tabs today then repeat again in 2 weeks to treat pityriasis versicolor (Patient not taking: Reported on 4/11/2022) 4 tablet 1       Social History     Tobacco Use     Smoking status: Current Every Day Smoker     Packs/day: 0.50     Years: 23.00      Pack years: 11.50     Types: Cigarettes     Smokeless tobacco: Never Used     Tobacco comment: Slowing down a lot   Vaping Use     Vaping Use: Never used   Substance Use Topics     Alcohol use: No     Drug use: No       Social History     Social History Narrative     Not on file       Allergies   Allergen Reactions     Fish Swelling and Other (See Comments)     Facial swelling and sleepiness     Latex      Other reaction(s): Other (see comments)     Mushroom Hives     Olive Oil Unknown and Other (See Comments)     Peanut (Diagnostic)      Other reaction(s): Edema     Penicillins      Other reaction(s): Other (see comments)     Trazodone      Other reaction(s): Other (see comments)       No results found for this or any previous visit (from the past 24 hour(s)).         Review of Systems:    ROS: 10 point ROS neg other than the symptoms noted above in the HPI.         Physical Exam:     Vitals:    04/11/22 1552   BP: 126/82   BP Location: Left arm   Patient Position: Sitting   Cuff Size: Adult Regular   Pulse: 76   SpO2: 98%   Weight: 83.2 kg (183 lb 6.4 oz)     Body mass index is 24.2 kg/m .    General appearance: Alert, cooperative, no distress, appears stated age  Head: Normocephalic, atraumatic, without obvious abnormality  Eyes: Pupils equal round, reactive.  Conjunctiva clear.  Nose: Nares normal, no drainage.  Throat: Lips, mucosa, tongue normal mucosa pink and moist  Neck: Supple, symmetric, trachea midline  Extremities: scabbed wound on right thumb approximately 1 cm in diameter. No pain on palpation. No drainage and no swelling or warmth.   Skin: melanocytic macules that are numerous and coalescing on his chest, back and shoulders  Psych: poor hygeine, speech is normal pace and volume, tangential, thought process is illogical, does not appear to responding to internal stimuli, no expression of paranoid delusions, mood is normal

## 2022-04-11 NOTE — TELEPHONE ENCOUNTER
Patient brought in form for dr womack to fill out. It is a medical referral form. Patient will bring copy back to his group home [facility]. Sent copy to scanning for patients chart.

## 2022-04-20 ENCOUNTER — HOSPITAL ENCOUNTER (OUTPATIENT)
Dept: MRI IMAGING | Facility: CLINIC | Age: 52
Discharge: HOME | End: 2022-04-20
Attending: FAMILY MEDICINE
Payer: MEDICARE

## 2022-04-20 DIAGNOSIS — M54.2 NECK PAIN: ICD-10-CM

## 2022-04-20 DIAGNOSIS — M54.12 CERVICAL RADICULOPATHY: ICD-10-CM

## 2022-04-20 PROCEDURE — G1004 CDSM NDSC: HCPCS

## 2022-04-21 DIAGNOSIS — G95.20 CERVICAL CORD COMPRESSION WITH MYELOPATHY (H): Primary | ICD-10-CM

## 2022-04-26 ENCOUNTER — TELEPHONE (OUTPATIENT)
Dept: FAMILY MEDICINE | Facility: CLINIC | Age: 52
End: 2022-04-26
Payer: MEDICARE

## 2022-04-26 DIAGNOSIS — M54.2 NECK PAIN: Primary | ICD-10-CM

## 2022-04-26 NOTE — TELEPHONE ENCOUNTER
Attempt #1 made to reach patient and relay message below. No voicemail available. Call completion leads to a ringing dial tone that doesn't end.    Nabila MONTAGUE RN    ----- Message from Lala Payan MD sent at 4/21/2022  7:42 AM CDT -----  Please call pt with result.     Hi Ihsan,    Your shoulder MRI result has returned. You have tendinosis of multiple rotator cuff muscles (inflammation) and thickening of other ligaments. To help with this we need to get you set up for physical therapy - Dr Payan has placed an order and you will get a phone call to schedule.    Thanks  Dr Payan

## 2022-04-27 ENCOUNTER — MEDICAL CORRESPONDENCE (OUTPATIENT)
Dept: NEUROSURGERY | Facility: CLINIC | Age: 52
End: 2022-04-27
Payer: MEDICARE

## 2022-05-02 ENCOUNTER — TELEPHONE (OUTPATIENT)
Dept: FAMILY MEDICINE | Facility: CLINIC | Age: 52
End: 2022-05-02

## 2022-05-02 ENCOUNTER — HOSPITAL ENCOUNTER (OUTPATIENT)
Dept: PHYSICAL THERAPY | Facility: REHABILITATION | Age: 52
Discharge: HOME | End: 2022-05-02
Attending: FAMILY MEDICINE
Payer: MEDICARE

## 2022-05-02 DIAGNOSIS — R29.3 POOR POSTURE: Primary | ICD-10-CM

## 2022-05-02 DIAGNOSIS — M54.2 NECK PAIN: ICD-10-CM

## 2022-05-02 PROCEDURE — 97012 MECHANICAL TRACTION THERAPY: CPT | Mod: GP | Performed by: PHYSICAL THERAPIST

## 2022-05-02 PROCEDURE — 97161 PT EVAL LOW COMPLEX 20 MIN: CPT | Mod: GP | Performed by: PHYSICAL THERAPIST

## 2022-05-02 PROCEDURE — 97110 THERAPEUTIC EXERCISES: CPT | Mod: GP | Performed by: PHYSICAL THERAPIST

## 2022-05-02 NOTE — PROGRESS NOTES
Date 5/2/22   Exercise    Stretches: cervical flexion  Cervical extension  Cervical rotation Hold 10 seconds X 1-3   Doorway pectoralis stretch Hold 30 seconds x 1-3 reps   Chin tuck and scapular retraction Hold 10 seconds X 10 reps

## 2022-05-02 NOTE — TELEPHONE ENCOUNTER
Kirsty WARD left message for patient. Please relay message down below when patient returns call.   Thanks,  Marko GUERRA RN

## 2022-05-02 NOTE — TELEPHONE ENCOUNTER
----- Message from Lala Payan MD sent at 4/21/2022  7:42 AM CDT -----  Please call pt with result.     Hi Ihsan,    Your shoulder MRI result has returned. You have tendinosis of multiple rotator cuff muscles (inflammation) and thickening of other ligaments. To help with this we need to get you set up for physical therapy - Dr Payan has placed an order and you will get a phone call to schedule.    Thanks  Dr Payan

## 2022-05-02 NOTE — PROGRESS NOTES
[facility]    OUTPATIENT PHYSICAL THERAPY ORTHOPEDIC EVALUATION  PLAN OF TREATMENT FOR OUTPATIENT REHABILITATION  (COMPLETE FOR INITIAL CLAIMS ONLY)  Patient's Last Name, First Name, M.I.  YOB: 1970  Ihsan Marcus    Provider s Name:  [facility]   Medical Record No.  [id number]   Start of Care Date:  05/02/22   Onset Date:  02/01/22   Type:     _X__PT   ___OT   ___SLP Medical Diagnosis:  Cervical Pain     PT Diagnosis:  Cervical Pain, shoulder pain   Visits from SOC:  1      _________________________________________________________________________________  Plan of Treatment/Functional Goals:  joint mobilization, manual therapy, neuromuscular re-education, ROM, strengthening, stretching     Traction, Ultrasound     Goals  Goal Identifier: lift  Goal Description: Pt will be able to lift obejcts >10# such as laundry  Target Date: 07/30/22    Goal Identifier: turning head  Goal Description: Pt will be able to turn head with more ease as AROM improves by 10 degrees  Target Date: 07/30/22                                                                      Therapy Frequency:  2 times/Week  Predicted Duration of Therapy Intervention:  8-10 visits, sera Ponce, PT                 I CERTIFY THE NEED FOR THESE SERVICES FURNISHED UNDER        THIS PLAN OF TREATMENT AND WHILE UNDER MY CARE     (Physician co-signature of this document indicates review and certification of the therapy plan).                     Certification Date From:  05/02/22   Certification Date To:  07/30/22    Referring Provider:  Dr. Payan    Initial Assessment        See Epic Evaluation Start of Care Date: 05/02/22 05/02/22 0912   General Information   Type of Visit Initial OP Ortho PT Evaluation   Start of  Care Date 05/02/22   Referring Physician Dr. Payan   Orders Evaluate and Treat   Certification Required? Yes   Medical Diagnosis Cervical Pain   Surgical/Medical history reviewed Yes   Precautions/Limitations no known precautions/limitations   General Information Comments HX TIa, borderline schizophrenia and bi-polar, lives in group home   Body Part(s)   Body Part(s) Cervical Spine   Presentation and Etiology   Pertinent history of current problem (include personal factors and/or comorbidities that impact the POC) I hurt my back at work in February and has had pain in neck and shoulder since.  He did go to chiropractor and physical therapy and it did not help. He went to the emergency room recently where he got a cortisone injection in his shoulder but feels it made it worse.  He continues to have a lot of pain   Impairments A. Pain;D. Decreased ROM;E. Decreased flexibility;F. Decreased strength and endurance;K. Numbness;L. Tingling   Functional Limitations perform activities of daily living;perform desired leisure / sports activities   Symptom Location Right neck and shoulder   How/Where did it occur While lifting   Onset date of current episode/exacerbation 02/01/22   Chronicity New   Pain rating (0-10 point scale) Best (/10);Worst (/10)   Best (/10) 8   Worst (/10) 10   Pain quality A. Sharp;C. Aching;E. Shooting   Frequency of pain/symptoms A. Constant   Pain/symptoms are: The same all the time   Pain/symptoms exacerbated by M. Other   Pain exacerbation comment lifting, sleeping, turning head   Pain/symptoms eased by D. Nothing   Progression of symptoms since onset: Worsened   Current / Previous Interventions   Diagnostic Tests: MRI;X-ray   X-ray Results Results   X-ray results shoulder-mild tendinosis   MRI Results Results   MRI results multi level degenerative changes, large extrusion C5-6 with cord compression   Prior Level of Function   Prior Level of Function-Mobility could do all tasks.   Current Level of  Function   Current Community Support Other   Patient role/employment history G. Disabled;H. 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 No   Have you fallen and had an injury in the past year? No   Is patient a fall risk? No   Abuse Screen (yes response referral indicated)   Feels Unsafe at Home or Work/School no   Feels Threatened by Someone no   Does Anyone Try to Keep You From Having Contact with Others or Doing Things Outside Your Home? no   Physical Signs of Abuse Present no   Patient needs abuse support services and resources No   Functional Scales   Functional Scales Other   Other Scales  NDI: 44   Cervical Spine   Cervical Right Rotation ROM 36, some times there is pain but not today   Cervical Left Rotation ROM 42 some times there is pain but not today   Cervical Flexion ROM WNL, pain in left and right shoulder as well as back of neck   Cervical Extension ROM 28 degrees, no discomfort   Shoulder AROM Screen Pt self limites Right Shoulder flex and abd to ~  degrees due to pain, normal R ER and IR ~ 50 degrees with pain, stiff on left   Shoulder/Wrist/Hand Strength Comments 4 to 4+/5 thorughout but unsure of accuracy of testing.   Upper Trapezius Flexibility fair   Levator Scapula Flexibility fair   Scalene Flexibility fair   Pectoralis Minor Flexibility poor   Vertebral Artery Test negative   Alar Ligament Test negative   Spurling Test + for pain on right   Cervical Distraction Test no change   Aviles Impingement Test pt guarding, unable to test   Segmental Mobility-Cervical hypomobile   Palpation Pain over B so region, cervical extensors, R UT, LS, scalene, Rc insertion, rhomboid, middle trapezius, tingling throughout R UE and hand   Posture head flexed (looking down toward floor), moderate+ forward shoulders   Planned  Therapy Interventions   Planned Therapy Interventions joint mobilization;manual therapy;neuromuscular re-education;ROM;strengthening;stretching   Planned Modality Interventions   Planned Modality Interventions Traction;Ultrasound   Clinical Impression   Criteria for Skilled Therapeutic Interventions Met yes, treatment indicated   PT Diagnosis Cervical Pain, shoulder pain   Influenced by the following impairments pain, numbness, tingling into R UE, + compression test   Functional limitations due to impairments turning head, looking up, lifting   Clinical Presentation Stable/Uncomplicated   Clinical Decision Making (Complexity) Low complexity   Therapy Frequency 2 times/Week   Predicted Duration of Therapy Intervention (days/wks) 8-10 visits, prn   Risk & Benefits of therapy have been explained Yes   Patient, Family & other staff in agreement with plan of care Yes   Clinical Impression Comments Pt presents with cervical pain and R UE numbness and tingling.  Pt reported symptoms started in February with a work incident but in reading pt's chart his report doesn't match.  He had physical therapy in 11/2021 for left shoulder pain, 2/4/22 for neck and R UE symptoms and now that symptoms are worse, he was referred again for his neck adn R UE.  He has not had any follow- through with any of his HEP. In February or March he moved to a different group home so [facility] was not able to provide further information.  He demonstrates + cervical compression test, poor posture, decrease cervical and shoulder AROM.  He did have significant relief in his R UE and scapular symptoms on R with cervical mechanical traction in clinic today.  He does see the neurosurgeon on thursday of this week.  Feel pt will benefit from physical therapy to decrease symptoms and return to PLOF.   Education Assessment   Barriers to Learning Cognitive   ORTHO GOALS   PT Ortho Eval Goals 1;2   Ortho Goal 1   Goal Identifier lift   Goal Description Pt will be able  to lift obejcts >10# such as laundry   Target Date 07/30/22   Ortho Goal 2   Goal Identifier turning head   Goal Description Pt will be able to turn head with more ease as AROM improves by 10 degrees   Target Date 07/30/22   Total Evaluation Time   PT Silva Low Complexity Minutes (22219) 30   Therapy Certification   Certification date from 05/02/22   Certification date to 07/30/22   Medical Diagnosis Cervical Pain

## 2022-05-03 DIAGNOSIS — J30.1 SEASONAL ALLERGIC RHINITIS DUE TO POLLEN: ICD-10-CM

## 2022-05-03 DIAGNOSIS — K59.00 CONSTIPATION, UNSPECIFIED CONSTIPATION TYPE: ICD-10-CM

## 2022-05-05 ENCOUNTER — PREP FOR PROCEDURE (OUTPATIENT)
Dept: NEUROSURGERY | Facility: CLINIC | Age: 52
End: 2022-05-05

## 2022-05-05 ENCOUNTER — TELEPHONE (OUTPATIENT)
Dept: NEUROSURGERY | Facility: CLINIC | Age: 52
End: 2022-05-05

## 2022-05-05 ENCOUNTER — HOSPITAL ENCOUNTER (OUTPATIENT)
Dept: RADIOLOGY | Facility: HOSPITAL | Age: 52
Discharge: HOME | DRG: 472 | End: 2022-05-05
Attending: SURGERY | Admitting: SURGERY
Payer: MEDICARE

## 2022-05-05 ENCOUNTER — HOSPITAL ENCOUNTER (INPATIENT)
Facility: HOSPITAL | Age: 52
LOS: 2 days | Discharge: NURSING, REHAB OR LONG-TERM CARE | DRG: 472 | End: 2022-05-07
Attending: EMERGENCY MEDICINE | Admitting: FAMILY MEDICINE
Payer: MEDICARE

## 2022-05-05 ENCOUNTER — OFFICE VISIT (OUTPATIENT)
Dept: NEUROSURGERY | Facility: CLINIC | Age: 52
End: 2022-05-05
Attending: FAMILY MEDICINE
Payer: MEDICARE

## 2022-05-05 ENCOUNTER — HOSPITAL ENCOUNTER (INPATIENT)
Facility: HOSPITAL | Age: 52
Setting detail: SURGERY ADMIT
End: 2022-05-05
Attending: SURGERY | Admitting: SURGERY
Payer: MEDICARE

## 2022-05-05 VITALS
SYSTOLIC BLOOD PRESSURE: 140 MMHG | WEIGHT: 183 LBS | BODY MASS INDEX: 24.25 KG/M2 | HEART RATE: 70 BPM | DIASTOLIC BLOOD PRESSURE: 79 MMHG | OXYGEN SATURATION: 98 % | HEIGHT: 73 IN

## 2022-05-05 DIAGNOSIS — G45.9 TRANSIENT ISCHEMIC ATTACK: ICD-10-CM

## 2022-05-05 DIAGNOSIS — Z72.0 TOBACCO ABUSE: ICD-10-CM

## 2022-05-05 DIAGNOSIS — G95.20 CORD COMPRESSION (H): ICD-10-CM

## 2022-05-05 DIAGNOSIS — M48.02 SPINAL STENOSIS IN CERVICAL REGION: ICD-10-CM

## 2022-05-05 DIAGNOSIS — M47.12 CERVICAL SPONDYLOSIS WITH MYELOPATHY: Primary | ICD-10-CM

## 2022-05-05 DIAGNOSIS — M54.2 NECK PAIN: ICD-10-CM

## 2022-05-05 DIAGNOSIS — R63.0 ANOREXIA: ICD-10-CM

## 2022-05-05 DIAGNOSIS — G95.20 CERVICAL CORD COMPRESSION WITH MYELOPATHY (H): ICD-10-CM

## 2022-05-05 DIAGNOSIS — R53.1 WEAKNESS: ICD-10-CM

## 2022-05-05 LAB
ABO/RH(D): NORMAL
ANION GAP SERPL CALCULATED.3IONS-SCNC: 7 MMOL/L (ref 5–18)
ANTIBODY SCREEN: NEGATIVE
APTT PPP: 29 SECONDS (ref 22–38)
BASOPHILS # BLD AUTO: 0 10E3/UL (ref 0–0.2)
BASOPHILS NFR BLD AUTO: 0 %
BLD PROD TYP BPU: NORMAL
BLD PROD TYP BPU: NORMAL
BLOOD COMPONENT TYPE: NORMAL
BLOOD COMPONENT TYPE: NORMAL
BUN SERPL-MCNC: 11 MG/DL (ref 8–22)
CALCIUM SERPL-MCNC: 9.5 MG/DL (ref 8.5–10.5)
CHLORIDE BLD-SCNC: 104 MMOL/L (ref 98–107)
CLOSURE TME COLL+ADP BLD: 131 SECONDS
CO2 SERPL-SCNC: 29 MMOL/L (ref 22–31)
CODING SYSTEM: NORMAL
CODING SYSTEM: NORMAL
CREAT SERPL-MCNC: 0.93 MG/DL (ref 0.7–1.3)
EOSINOPHIL # BLD AUTO: 0.1 10E3/UL (ref 0–0.7)
EOSINOPHIL NFR BLD AUTO: 1 %
ERYTHROCYTE [DISTWIDTH] IN BLOOD BY AUTOMATED COUNT: 13.8 % (ref 10–15)
GFR SERPL CREATININE-BSD FRML MDRD: >90 ML/MIN/1.73M2
GLUCOSE BLD-MCNC: 90 MG/DL (ref 70–125)
HCT VFR BLD AUTO: 44 % (ref 40–53)
HGB BLD-MCNC: 14.6 G/DL (ref 13.3–17.7)
IMM GRANULOCYTES # BLD: 0 10E3/UL
IMM GRANULOCYTES NFR BLD: 0 %
INR PPP: 1.09 (ref 0.9–1.15)
LYMPHOCYTES # BLD AUTO: 2 10E3/UL (ref 0.8–5.3)
LYMPHOCYTES NFR BLD AUTO: 36 %
MAGNESIUM SERPL-MCNC: 2.1 MG/DL (ref 1.8–2.6)
MCH RBC QN AUTO: 30.4 PG (ref 26.5–33)
MCHC RBC AUTO-ENTMCNC: 33.2 G/DL (ref 31.5–36.5)
MCV RBC AUTO: 92 FL (ref 78–100)
MONOCYTES # BLD AUTO: 0.4 10E3/UL (ref 0–1.3)
MONOCYTES NFR BLD AUTO: 7 %
NEUTROPHILS # BLD AUTO: 3.1 10E3/UL (ref 1.6–8.3)
NEUTROPHILS NFR BLD AUTO: 56 %
NRBC # BLD AUTO: 0 10E3/UL
NRBC BLD AUTO-RTO: 0 /100
PLATELET # BLD AUTO: 276 10E3/UL (ref 150–450)
POTASSIUM BLD-SCNC: 3.8 MMOL/L (ref 3.5–5)
RBC # BLD AUTO: 4.8 10E6/UL (ref 4.4–5.9)
SARS-COV-2 RNA RESP QL NAA+PROBE: NEGATIVE
SODIUM SERPL-SCNC: 140 MMOL/L (ref 136–145)
SPECIMEN EXPIRATION DATE: NORMAL
TROPONIN I SERPL-MCNC: <0.01 NG/ML (ref 0–0.29)
UNIT ABO/RH: NORMAL
UNIT ABO/RH: NORMAL
UNIT NUMBER: NORMAL
UNIT NUMBER: NORMAL
UNIT STATUS: NORMAL
UNIT STATUS: NORMAL
UNIT TYPE ISBT: 5100
UNIT TYPE ISBT: 6200
WBC # BLD AUTO: 5.6 10E3/UL (ref 4–11)

## 2022-05-05 PROCEDURE — 85004 AUTOMATED DIFF WBC COUNT: CPT | Performed by: FAMILY MEDICINE

## 2022-05-05 PROCEDURE — 36415 COLL VENOUS BLD VENIPUNCTURE: CPT | Performed by: EMERGENCY MEDICINE

## 2022-05-05 PROCEDURE — C9803 HOPD COVID-19 SPEC COLLECT: HCPCS

## 2022-05-05 PROCEDURE — 36415 COLL VENOUS BLD VENIPUNCTURE: CPT | Performed by: FAMILY MEDICINE

## 2022-05-05 PROCEDURE — 99205 OFFICE O/P NEW HI 60 MIN: CPT | Performed by: SURGERY

## 2022-05-05 PROCEDURE — 87635 SARS-COV-2 COVID-19 AMP PRB: CPT | Performed by: EMERGENCY MEDICINE

## 2022-05-05 PROCEDURE — 93005 ELECTROCARDIOGRAM TRACING: CPT

## 2022-05-05 PROCEDURE — 250N000013 HC RX MED GY IP 250 OP 250 PS 637: Performed by: EMERGENCY MEDICINE

## 2022-05-05 PROCEDURE — 85730 THROMBOPLASTIN TIME PARTIAL: CPT | Performed by: FAMILY MEDICINE

## 2022-05-05 PROCEDURE — 250N000011 HC RX IP 250 OP 636: Performed by: EMERGENCY MEDICINE

## 2022-05-05 PROCEDURE — 85576 BLOOD PLATELET AGGREGATION: CPT

## 2022-05-05 PROCEDURE — 86850 RBC ANTIBODY SCREEN: CPT | Performed by: FAMILY MEDICINE

## 2022-05-05 PROCEDURE — 83735 ASSAY OF MAGNESIUM: CPT | Performed by: FAMILY MEDICINE

## 2022-05-05 PROCEDURE — 72040 X-RAY EXAM NECK SPINE 2-3 VW: CPT

## 2022-05-05 PROCEDURE — 86901 BLOOD TYPING SEROLOGIC RH(D): CPT | Performed by: FAMILY MEDICINE

## 2022-05-05 PROCEDURE — 84484 ASSAY OF TROPONIN QUANT: CPT

## 2022-05-05 PROCEDURE — 99285 EMERGENCY DEPT VISIT HI MDM: CPT

## 2022-05-05 PROCEDURE — 120N000001 HC R&B MED SURG/OB

## 2022-05-05 PROCEDURE — 85610 PROTHROMBIN TIME: CPT | Performed by: FAMILY MEDICINE

## 2022-05-05 PROCEDURE — 82310 ASSAY OF CALCIUM: CPT | Performed by: FAMILY MEDICINE

## 2022-05-05 RX ORDER — PROCHLORPERAZINE MALEATE 10 MG
10 TABLET ORAL EVERY 6 HOURS PRN
Status: DISCONTINUED | OUTPATIENT
Start: 2022-05-05 | End: 2022-05-07 | Stop reason: HOSPADM

## 2022-05-05 RX ORDER — OXYCODONE HYDROCHLORIDE 5 MG/1
5 TABLET ORAL EVERY 6 HOURS PRN
Status: DISCONTINUED | OUTPATIENT
Start: 2022-05-05 | End: 2022-05-06

## 2022-05-05 RX ORDER — FAMOTIDINE 20 MG/1
20 TABLET, FILM COATED ORAL ONCE
Status: CANCELLED | OUTPATIENT
Start: 2022-05-05 | End: 2022-05-05

## 2022-05-05 RX ORDER — ONDANSETRON 4 MG/1
4 TABLET, ORALLY DISINTEGRATING ORAL EVERY 6 HOURS PRN
Status: DISCONTINUED | OUTPATIENT
Start: 2022-05-05 | End: 2022-05-06

## 2022-05-05 RX ORDER — KETOROLAC TROMETHAMINE 30 MG/ML
15 INJECTION, SOLUTION INTRAMUSCULAR; INTRAVENOUS ONCE
Status: COMPLETED | OUTPATIENT
Start: 2022-05-05 | End: 2022-05-05

## 2022-05-05 RX ORDER — OLANZAPINE 15 MG/1
15 TABLET ORAL AT BEDTIME
COMMUNITY

## 2022-05-05 RX ORDER — CLINDAMYCIN PHOSPHATE 900 MG/50ML
900 INJECTION, SOLUTION INTRAVENOUS SEE ADMIN INSTRUCTIONS
Status: CANCELLED | OUTPATIENT
Start: 2022-05-05

## 2022-05-05 RX ORDER — LIDOCAINE 40 MG/G
CREAM TOPICAL
Status: DISCONTINUED | OUTPATIENT
Start: 2022-05-05 | End: 2022-05-07 | Stop reason: HOSPADM

## 2022-05-05 RX ORDER — ACETAMINOPHEN 325 MG/1
650 TABLET ORAL EVERY 6 HOURS PRN
Status: DISCONTINUED | OUTPATIENT
Start: 2022-05-05 | End: 2022-05-06

## 2022-05-05 RX ORDER — PROCHLORPERAZINE 25 MG
25 SUPPOSITORY, RECTAL RECTAL EVERY 12 HOURS PRN
Status: DISCONTINUED | OUTPATIENT
Start: 2022-05-05 | End: 2022-05-07 | Stop reason: HOSPADM

## 2022-05-05 RX ORDER — NICOTINE 21 MG/24HR
1 PATCH, TRANSDERMAL 24 HOURS TRANSDERMAL DAILY
Status: DISCONTINUED | OUTPATIENT
Start: 2022-05-06 | End: 2022-05-07 | Stop reason: HOSPADM

## 2022-05-05 RX ORDER — CLINDAMYCIN PHOSPHATE 900 MG/50ML
900 INJECTION, SOLUTION INTRAVENOUS
Status: CANCELLED | OUTPATIENT
Start: 2022-05-05

## 2022-05-05 RX ORDER — MAGNESIUM HYDROXIDE/ALUMINUM HYDROXICE/SIMETHICONE 120; 1200; 1200 MG/30ML; MG/30ML; MG/30ML
30 SUSPENSION ORAL ONCE
Status: COMPLETED | OUTPATIENT
Start: 2022-05-05 | End: 2022-05-05

## 2022-05-05 RX ORDER — ONDANSETRON 2 MG/ML
4 INJECTION INTRAMUSCULAR; INTRAVENOUS EVERY 6 HOURS PRN
Status: DISCONTINUED | OUTPATIENT
Start: 2022-05-05 | End: 2022-05-06

## 2022-05-05 RX ORDER — CETIRIZINE HYDROCHLORIDE 10 MG/1
10 TABLET ORAL DAILY
Qty: 30 TABLET | Refills: 11 | Status: SHIPPED | OUTPATIENT
Start: 2022-05-05 | End: 2023-03-13

## 2022-05-05 RX ORDER — ACETAMINOPHEN 650 MG/1
650 SUPPOSITORY RECTAL EVERY 6 HOURS PRN
Status: DISCONTINUED | OUTPATIENT
Start: 2022-05-05 | End: 2022-05-07 | Stop reason: HOSPADM

## 2022-05-05 RX ORDER — CALCIUM POLYCARBOPHIL 625 MG 625 MG/1
2 TABLET ORAL DAILY
Qty: 60 TABLET | Refills: 11 | Status: SHIPPED | OUTPATIENT
Start: 2022-05-05 | End: 2023-04-06

## 2022-05-05 RX ADMIN — KETOROLAC TROMETHAMINE 15 MG: 30 INJECTION, SOLUTION INTRAMUSCULAR at 20:42

## 2022-05-05 RX ADMIN — ALUMINUM HYDROXIDE, MAGNESIUM HYDROXIDE, AND SIMETHICONE 30 ML: 200; 200; 20 SUSPENSION ORAL at 20:43

## 2022-05-05 ASSESSMENT — ACTIVITIES OF DAILY LIVING (ADL)
ADLS_ACUITY_SCORE: 12
ADLS_ACUITY_SCORE: 12

## 2022-05-05 NOTE — TELEPHONE ENCOUNTER
Called pt's group home and informed the house lead that the pt will need to proceed to [facility] emergency room for admission prior to surgery. Instructed them to bring the pt to the ED as quickly as they are able given staffing constraints.     [facility] lead verbalized understanding and confirmed the address of [facility]. Informed her that surgery will be performed tomorrow.     Kaylee Zapata RN

## 2022-05-05 NOTE — NURSING NOTE
Neurosurgery consultation was requested by: Dr. Albarran   Pain: neck pain   Radicular Pain is present: right shoulder pain   Lhermitte sign: no  Motor complaints: complete right arm weakness and left arm and bilateral leg weakness   Sensory complaints: numbness in all 4 extremities  Gait and balance issues: yes, falls a lot, and walks as stiff as a board.   Bowel or bladder issues: can not feel himself urinate and can not feel himself bare down to have bowel movement.   Duration of SX is: 1 month   The symptoms are worse with: constant   The symptoms are better with: constant   Injury: no   Severity is: severe  Patient has tried the following conservative measures: He had a injection which helped his neck and shoulder pain but then numbness became worse.   NDI score is : 47%  ED Riggs

## 2022-05-05 NOTE — PROGRESS NOTES
NEUROSURGERY CONSULTATION NOTE      Neurosurgery was asked to see this patient by Griselda Bains MD for evaluation of cervical myelopathy with stenosis.        CONSULTATION ASSESSMENT AND PLAN:     51 yo male with a history of bipolar 1 disorder, chemical dependence, gastritis, GERD, asthma, TIA who resides in a group home presents with 3 months of progressive cervical radiculopathy and myelopathy symptoms.  Patient is very myelopathic on exam including hyperreflexia, bilateral Neil's, Clonus and diffuse weakness worse in his  and hip flexion bilaterally.  MRI of his cervical spine shows a very large disc herniation at cervical 5-6 level which contributes to very severe spinal canal canal stenosis with cord compression and cord signal abnormality.  The disc extends caudally behind the cervical 6 body.  He also has moderate spinal canal stenosis at both cervical 3-4 and cervical 4-5 level.  Additionally has foraminal narrowing moderate left greater than right at cervical 4-5 and severe left and moderate right at cervical 3-4.  Patient's x-ray shows straightening of the normal lordosis lordosis but does not appear to have any instability. Final read pending.     Given his progressive myelopathy symptoms in addition to radiculopathy in the setting of severe compression of his spinal cord, I recommend urgent surgery including a cervical 5-cervical 6 anterior cervical decompression and fusion with plate to decompress the spinal cord.  Discussed with patient that he does have stenosis at the cervical 3-5 levels and may be at higher risk of needing additional surgery at these levels in the future.  He is an active every day smoker and has a higher risk for fusion failure.  Recommend immediate smoking cessation. Risks of anterior neck surgery include but are not limited to inadequate symptom relief, nerve or spinal cord damage, durotomy, hematoma, infection, injury to trachea or esophagus, speech disturbance  from injury to the recurrent laryngeal nerve, swallowing difficulties, failed fusion.  Risks were discussed with both patient and his legal guardian Anat Andrew.  Both agreed to proceed with surgery and appeared to understand the risks and benefits.    I spent more than 60 minutes in this apt, examining the pt, reviewing the scans, reviewing notes from chart, discussing treatment options with risks and benefits and coordinating care.     Laurie Gage MD      HPI:   53 yo male with a history of bipolar 1 disorder, chemical dependence, gastritis, GERD, asthma, TIA who resides in a group home presents with 3 months of progressive cervical radiculopathy and myelopathy symptoms.  He thinks his symptoms began sometime around the beginning of February.  Noted mostly pain in his neck into his right shoulder and arm  Seen in the ED on February 6, 2022 and was given a trigger point injection with improvement of his symptoms.  Since that time he is progressively worsened.  He now describes having increased pain and numbness in his bilateral arms as well as weakness particularly of  but notices arms and legs are weak as well. Right  worse then left.  Also is dropping objects frequently.  He is having difficulty ambulating upstairs.  He also has imbalance with walking.  More recently he has developed both urinary and bowel incontinence.  Has progressed within the last weeks to few days.      Asa 81 mg daily last this AM.     Past Medical History:   Diagnosis Date     Mild persistent asthma 4/27/2018       Past Surgical History:   Procedure Laterality Date     COLONOSCOPY N/A 8/13/2021    Procedure: COLONOSCOPY;  Surgeon: Stewart Monsalve MD;  Location: [facility]       REVIEW OF SYSTEMS:  Past DVT.     MEDICATIONS:  Current Outpatient Medications   Medication Sig Dispense Refill     acetaminophen (TYLENOL) 500 MG tablet Take 1 tablet (500 mg) by mouth every 4 hours as needed for mild pain 180 tablet 1     aspirin  (ASA) 81 MG chewable tablet Take 1 tablet (81 mg) by mouth daily 90 tablet 3     calcium polycarbophil (FIBERCON) 625 MG tablet Take 2 tablets (1,250 mg) by mouth daily 60 tablet 11     cetirizine (ZYRTEC) 10 MG tablet Take 1 tablet (10 mg) by mouth daily 30 tablet 11     diclofenac (VOLTAREN) 1 % topical gel Apply 4 g topically 4 times daily As needed for pain in the neck and upper right arm. 150 g 1     famotidine (PEPCID) 20 MG tablet Take 1 tablet (20 mg) by mouth 2 times daily as needed (for symptoms of reflux) 180 tablet 3     gabapentin (NEURONTIN) 100 MG capsule Take 1 capsule (100 mg) by mouth 3 times daily as needed for neuropathic pain 90 capsule 0     guaiFENesin (ROBITUSSIN) 100 MG/5ML liquid Take 10 mLs (200 mg) by mouth every 4 hours as needed for cough 473 mL 3     ibuprofen (ADVIL/MOTRIN) 600 MG tablet Take 1 tablet (600 mg) by mouth every 6 hours as needed for moderate pain 90 tablet 0     ketoconazole (NIZORAL) 2 % external shampoo Apply topically daily as needed for itching or irritation 100 mL 1     melatonin 3 MG tablet Take 6 mg by mouth nightly as needed        Menthol-Camphor (ICY HOT ADVANCED PAIN RELIEF) 16-11 % CREA Externally apply 2 g topically 5 x daily PRN (for pain on shoulder for muscle spasm and joint pain) 56 g 0     OLANZapine (ZYPREXA) 10 MG tablet TAKE ONE AND ONE HALF TABLETS BY MOUTH EVERY DAY AS NEEDED FOR ANXIETY, AGITATION, SLEEP       rosuvastatin (CRESTOR) 10 MG tablet TAKE ONE TABLET BY MOUTH EVERY DAY 90 tablet 3     salicylic acid (COMPOUND W MAX STRENGTH) 17 % external gel Apply topically daily TO LESIONS ON FEET UNTIL CLEAR. 7 g 1     senna-docusate (SENOKOT-S/PERICOLACE) 8.6-50 MG tablet Take 1 tablet by mouth daily as needed for constipation 90 tablet 2     tiZANidine (ZANAFLEX) 4 MG capsule Take 1 capsule (4 mg) by mouth 3 times daily As needed for muscle spasm 20 capsule 0         ALLERGIES/SENSITIVITIES:     Allergies   Allergen Reactions     Fish Swelling and  "Other (See Comments)     Facial swelling and sleepiness     Latex      Other reaction(s): Other (see comments)     Mushroom Hives     Olive Oil Unknown and Other (See Comments)     Peanut (Diagnostic)      Other reaction(s): Edema     Penicillins      Other reaction(s): Other (see comments)     Trazodone      Other reaction(s): Other (see comments)       PERTINENT SOCIAL HISTORY:  Social History     Socioeconomic History     Marital status: Single     Spouse name: None     Number of children: None     Years of education: None     Highest education level: None   Tobacco Use     Smoking status: Current Every Day Smoker     Packs/day: 0.50     Years: 23.00     Pack years: 11.50     Types: Cigarettes     Smokeless tobacco: Never Used     Tobacco comment: Slowing down a lot   Vaping Use     Vaping Use: Never used   Substance and Sexual Activity     Alcohol use: No     Drug use: No     Sexual activity: Yes         FAMILY HISTORY:  Family History   Problem Relation Age of Onset     Diabetes Mother      Coronary Artery Disease Mother      Coronary Artery Disease Father      Cancer No family hx of         PHYSICAL EXAM:   Constitutional: BP (!) 140/79   Pulse 70   Ht 6' 1\" (1.854 m)   Wt 183 lb (83 kg)   SpO2 98%   BMI 24.14 kg/m       Mental Status: A & O in no acute distress.  Affect is appropriate.  Speech is fluent.  Recent and remote memory are intact.  Attention span and concentration are normal.     Motor:  Increased stiffness and tone in all muscle groups of upper and lower extremities.    Strength:    and interossei both 4-/5 rest UE 4+/5  Right hip flexion 1-2/5 left hip flexion 4/5 rest LE 4+/5     Sensory: Sensation diminished bilaterally to light touch diffusely      Coordination:  Imbalanced wide based short gait      Reflexes: supinator, biceps, triceps, knee/ ankle jerk intact- brisk x 4. Bilateral alfaro's R>L / few beats clonus.    IMAGING:  I personally reviewed all radiographic " images         Cc:   Mayda Albarran

## 2022-05-05 NOTE — ED NOTES
Expected Patient Referral to ED  4:04 PM    Referring Clinic/Provider:  Neurosurgery    Reason for referral/Clinical facts:  52-year-old male with severe cervical myelopathy, scheduled for surgery tomorrow, admit for preop history and labs including platelet function test    Recommendations provided:  Contact inpatient service to discuss direct admission, no beds available.  Did discuss ED preop evaluation and discharge for surgery tomorrow but not feasible due to insurance issues.    Caller was informed that this institution does possess the capabilities and/or resources to provide for patient and should be transferred to our facility.    Discussed that if direct admit is sought and any hurdles are encountered, this ED would be happy to see the patient and evaluate.    Informed caller that recommendations provided are recommendations based only on the facts provided and that they responsible to accept or reject the advice, or to seek a formal in person consultation as needed and that this ED will see/treat patient should they arrive.      Alejandro Cortes MD  [facility] EMERGENCY DEPARTMENT  [address] [place] [id number]  [phone]       Alejandro Cortes MD  05/05/22 [id number]

## 2022-05-05 NOTE — ED TRIAGE NOTES
Pt is c/o bilateral arm and leg numbness for past two months.  Pt has diagnosis of cervical myelopathy and is supposed to have surgery tomorrow.     Triage Assessment     Row Name 05/05/22 5616       Triage Assessment (Adult)    Airway WDL WDL       Respiratory WDL    Respiratory WDL WDL       Skin Circulation/Temperature WDL    Skin Circulation/Temperature WDL WDL       Cardiac WDL    Cardiac WDL WDL       Peripheral/Neurovascular WDL    Peripheral Neurovascular WDL WDL       Cognitive/Neuro/Behavioral WDL    Cognitive/Neuro/Behavioral WDL WDL

## 2022-05-05 NOTE — PROGRESS NOTES
Neurosurgery Treatment Plan:   Reviewed patients chart  Patient seen in clinic by Dr. Gage with bilateral upper and lower extremity weakness, urinary and bowel incontinence by 2 weeks   Symptoms progressive over past 3 months      Images:   Cervical MRI   Large disc extrusion at C5-C6 contributes to severe spinal canal stenosis with cord compression and cord signal abnormality     Plan:   Patient to be admitted to [facility] for urgent surgical intervention planned for tomorrow  Hold ASA   NPO at midnight   CBC  BMP   PT and INR   Type and cross 2 units platelets to be on hold for OR      Ruthann Law PA-C  [facility]  O: [phone]

## 2022-05-06 ENCOUNTER — ANESTHESIA (OUTPATIENT)
Dept: SURGERY | Facility: HOSPITAL | Age: 52
DRG: 472 | End: 2022-05-06
Payer: MEDICARE

## 2022-05-06 ENCOUNTER — APPOINTMENT (OUTPATIENT)
Dept: RADIOLOGY | Facility: HOSPITAL | Age: 52
DRG: 472 | End: 2022-05-06
Attending: SURGERY
Payer: MEDICARE

## 2022-05-06 ENCOUNTER — ANESTHESIA EVENT (OUTPATIENT)
Dept: SURGERY | Facility: HOSPITAL | Age: 52
DRG: 472 | End: 2022-05-06
Payer: MEDICARE

## 2022-05-06 DIAGNOSIS — R52 PAIN: ICD-10-CM

## 2022-05-06 DIAGNOSIS — K21.9 GASTROESOPHAGEAL REFLUX DISEASE WITHOUT ESOPHAGITIS: ICD-10-CM

## 2022-05-06 LAB
ATRIAL RATE - MUSE: 69 BPM
DIASTOLIC BLOOD PRESSURE - MUSE: NORMAL MMHG
INTERPRETATION ECG - MUSE: NORMAL
P AXIS - MUSE: 36 DEGREES
PR INTERVAL - MUSE: 186 MS
QRS DURATION - MUSE: 98 MS
QT - MUSE: 376 MS
QTC - MUSE: 402 MS
R AXIS - MUSE: 40 DEGREES
SYSTOLIC BLOOD PRESSURE - MUSE: NORMAL MMHG
T AXIS - MUSE: -2 DEGREES
TROPONIN I SERPL-MCNC: 0.01 NG/ML (ref 0–0.29)
VENTRICULAR RATE- MUSE: 69 BPM

## 2022-05-06 PROCEDURE — 710N000009 HC RECOVERY PHASE 1, LEVEL 1, PER MIN: Performed by: SURGERY

## 2022-05-06 PROCEDURE — 250N000011 HC RX IP 250 OP 636: Performed by: SURGERY

## 2022-05-06 PROCEDURE — 258N000003 HC RX IP 258 OP 636: Performed by: PHYSICIAN ASSISTANT

## 2022-05-06 PROCEDURE — 22853 INSJ BIOMECHANICAL DEVICE: CPT | Performed by: SURGERY

## 2022-05-06 PROCEDURE — 250N000011 HC RX IP 250 OP 636: Performed by: PHYSICIAN ASSISTANT

## 2022-05-06 PROCEDURE — 999N000180 XR SURGERY CARM FLUORO LESS THAN 5 MIN

## 2022-05-06 PROCEDURE — 258N000003 HC RX IP 258 OP 636: Performed by: ANESTHESIOLOGY

## 2022-05-06 PROCEDURE — 250N000009 HC RX 250: Performed by: NURSE ANESTHETIST, CERTIFIED REGISTERED

## 2022-05-06 PROCEDURE — 120N000001 HC R&B MED SURG/OB

## 2022-05-06 PROCEDURE — 36415 COLL VENOUS BLD VENIPUNCTURE: CPT

## 2022-05-06 PROCEDURE — C1713 ANCHOR/SCREW BN/BN,TIS/BN: HCPCS | Performed by: SURGERY

## 2022-05-06 PROCEDURE — 250N000013 HC RX MED GY IP 250 OP 250 PS 637

## 2022-05-06 PROCEDURE — 278N000051 HC OR IMPLANT GENERAL: Performed by: SURGERY

## 2022-05-06 PROCEDURE — 22551 ARTHRD ANT NTRBDY CERVICAL: CPT | Mod: AS | Performed by: PHYSICIAN ASSISTANT

## 2022-05-06 PROCEDURE — 0RB30ZZ EXCISION OF CERVICAL VERTEBRAL DISC, OPEN APPROACH: ICD-10-PCS | Performed by: SURGERY

## 2022-05-06 PROCEDURE — 0RG10A0 FUSION OF CERVICAL VERTEBRAL JOINT WITH INTERBODY FUSION DEVICE, ANTERIOR APPROACH, ANTERIOR COLUMN, OPEN APPROACH: ICD-10-PCS | Performed by: SURGERY

## 2022-05-06 PROCEDURE — 250N000013 HC RX MED GY IP 250 OP 250 PS 637: Performed by: ANESTHESIOLOGY

## 2022-05-06 PROCEDURE — 20930 SP BONE ALGRFT MORSEL ADD-ON: CPT | Performed by: SURGERY

## 2022-05-06 PROCEDURE — C1762 CONN TISS, HUMAN(INC FASCIA): HCPCS | Performed by: SURGERY

## 2022-05-06 PROCEDURE — 370N000017 HC ANESTHESIA TECHNICAL FEE, PER MIN: Performed by: SURGERY

## 2022-05-06 PROCEDURE — 250N000011 HC RX IP 250 OP 636: Performed by: NURSE ANESTHETIST, CERTIFIED REGISTERED

## 2022-05-06 PROCEDURE — 258N000003 HC RX IP 258 OP 636: Performed by: NURSE ANESTHETIST, CERTIFIED REGISTERED

## 2022-05-06 PROCEDURE — 360N000077 HC SURGERY LEVEL 4, PER MIN: Performed by: SURGERY

## 2022-05-06 PROCEDURE — 22853 INSJ BIOMECHANICAL DEVICE: CPT | Mod: AS | Performed by: PHYSICIAN ASSISTANT

## 2022-05-06 PROCEDURE — 99223 1ST HOSP IP/OBS HIGH 75: CPT | Mod: AI

## 2022-05-06 PROCEDURE — 999N000141 HC STATISTIC PRE-PROCEDURE NURSING ASSESSMENT: Performed by: SURGERY

## 2022-05-06 PROCEDURE — 22551 ARTHRD ANT NTRBDY CERVICAL: CPT | Performed by: SURGERY

## 2022-05-06 PROCEDURE — 250N000013 HC RX MED GY IP 250 OP 250 PS 637: Performed by: PHYSICIAN ASSISTANT

## 2022-05-06 PROCEDURE — 22845 INSERT SPINE FIXATION DEVICE: CPT | Mod: 59 | Performed by: SURGERY

## 2022-05-06 PROCEDURE — 84484 ASSAY OF TROPONIN QUANT: CPT

## 2022-05-06 PROCEDURE — 250N000009 HC RX 250: Performed by: SURGERY

## 2022-05-06 PROCEDURE — 22845 INSERT SPINE FIXATION DEVICE: CPT | Mod: AS | Performed by: PHYSICIAN ASSISTANT

## 2022-05-06 PROCEDURE — 272N000001 HC OR GENERAL SUPPLY STERILE: Performed by: SURGERY

## 2022-05-06 DEVICE — GRAFT BONE FIBERS GRAFTON DBM DBF 1ML T50101: Type: IMPLANTABLE DEVICE | Site: NECK | Status: FUNCTIONAL

## 2022-05-06 DEVICE — IMP PLATE CERV MEDT ZEVO 23MM 1 LVL 3001023: Type: IMPLANTABLE DEVICE | Site: NECK | Status: FUNCTIONAL

## 2022-05-06 DEVICE — INTERBODY FUSION DEVICE  6 DEGREE MEDIUM 7MM
Type: IMPLANTABLE DEVICE | Site: NECK | Status: FUNCTIONAL
Brand: ENDOSKELETON® TC NANOLOCK® SURFACE TECHNOLOGY

## 2022-05-06 RX ORDER — SODIUM CHLORIDE 9 MG/ML
INJECTION, SOLUTION INTRAVENOUS CONTINUOUS
Status: DISCONTINUED | OUTPATIENT
Start: 2022-05-06 | End: 2022-05-07

## 2022-05-06 RX ORDER — PROPOFOL 10 MG/ML
INJECTION, EMULSION INTRAVENOUS CONTINUOUS PRN
Status: DISCONTINUED | OUTPATIENT
Start: 2022-05-06 | End: 2022-05-06

## 2022-05-06 RX ORDER — NALOXONE HYDROCHLORIDE 0.4 MG/ML
0.4 INJECTION, SOLUTION INTRAMUSCULAR; INTRAVENOUS; SUBCUTANEOUS
Status: DISCONTINUED | OUTPATIENT
Start: 2022-05-06 | End: 2022-05-07 | Stop reason: HOSPADM

## 2022-05-06 RX ORDER — FENTANYL CITRATE 50 UG/ML
INJECTION, SOLUTION INTRAMUSCULAR; INTRAVENOUS PRN
Status: DISCONTINUED | OUTPATIENT
Start: 2022-05-06 | End: 2022-05-06

## 2022-05-06 RX ORDER — ONDANSETRON 4 MG/1
4 TABLET, ORALLY DISINTEGRATING ORAL EVERY 6 HOURS PRN
Status: DISCONTINUED | OUTPATIENT
Start: 2022-05-06 | End: 2022-05-07 | Stop reason: HOSPADM

## 2022-05-06 RX ORDER — OXYCODONE HYDROCHLORIDE 5 MG/1
5-10 TABLET ORAL EVERY 4 HOURS PRN
Status: DISCONTINUED | OUTPATIENT
Start: 2022-05-06 | End: 2022-05-06 | Stop reason: HOSPADM

## 2022-05-06 RX ORDER — METHOCARBAMOL 500 MG/1
500 TABLET, FILM COATED ORAL EVERY 6 HOURS PRN
Status: DISCONTINUED | OUTPATIENT
Start: 2022-05-06 | End: 2022-05-07 | Stop reason: HOSPADM

## 2022-05-06 RX ORDER — ACETAMINOPHEN 325 MG/1
650 TABLET ORAL EVERY 4 HOURS PRN
Status: DISCONTINUED | OUTPATIENT
Start: 2022-05-09 | End: 2022-05-07 | Stop reason: HOSPADM

## 2022-05-06 RX ORDER — LORATADINE 10 MG/1
10 TABLET ORAL DAILY PRN
Status: DISCONTINUED | OUTPATIENT
Start: 2022-05-06 | End: 2022-05-07 | Stop reason: HOSPADM

## 2022-05-06 RX ORDER — SODIUM CHLORIDE, SODIUM LACTATE, POTASSIUM CHLORIDE, CALCIUM CHLORIDE 600; 310; 30; 20 MG/100ML; MG/100ML; MG/100ML; MG/100ML
INJECTION, SOLUTION INTRAVENOUS CONTINUOUS
Status: DISCONTINUED | OUTPATIENT
Start: 2022-05-06 | End: 2022-05-06 | Stop reason: HOSPADM

## 2022-05-06 RX ORDER — PROCHLORPERAZINE MALEATE 10 MG
10 TABLET ORAL EVERY 6 HOURS PRN
Status: DISCONTINUED | OUTPATIENT
Start: 2022-05-06 | End: 2022-05-06

## 2022-05-06 RX ORDER — ONDANSETRON 2 MG/ML
4 INJECTION INTRAMUSCULAR; INTRAVENOUS EVERY 6 HOURS PRN
Status: DISCONTINUED | OUTPATIENT
Start: 2022-05-06 | End: 2022-05-07 | Stop reason: HOSPADM

## 2022-05-06 RX ORDER — ROSUVASTATIN CALCIUM 10 MG/1
10 TABLET, COATED ORAL DAILY
Status: DISCONTINUED | OUTPATIENT
Start: 2022-05-06 | End: 2022-05-07 | Stop reason: HOSPADM

## 2022-05-06 RX ORDER — CLINDAMYCIN PHOSPHATE 900 MG/50ML
900 INJECTION, SOLUTION INTRAVENOUS
Status: COMPLETED | OUTPATIENT
Start: 2022-05-06 | End: 2022-05-06

## 2022-05-06 RX ORDER — ACETAMINOPHEN 325 MG/1
975 TABLET ORAL EVERY 8 HOURS
Status: DISCONTINUED | OUTPATIENT
Start: 2022-05-06 | End: 2022-05-07 | Stop reason: HOSPADM

## 2022-05-06 RX ORDER — HYDROMORPHONE HYDROCHLORIDE 1 MG/ML
0.2 INJECTION, SOLUTION INTRAMUSCULAR; INTRAVENOUS; SUBCUTANEOUS
Status: DISCONTINUED | OUTPATIENT
Start: 2022-05-06 | End: 2022-05-07 | Stop reason: HOSPADM

## 2022-05-06 RX ORDER — ENOXAPARIN SODIUM 100 MG/ML
40 INJECTION SUBCUTANEOUS EVERY 24 HOURS
Status: DISCONTINUED | OUTPATIENT
Start: 2022-05-08 | End: 2022-05-07 | Stop reason: HOSPADM

## 2022-05-06 RX ORDER — CALCIUM POLYCARBOPHIL 625 MG 625 MG/1
1250 TABLET ORAL DAILY
Status: DISCONTINUED | OUTPATIENT
Start: 2022-05-06 | End: 2022-05-07 | Stop reason: HOSPADM

## 2022-05-06 RX ORDER — OXYCODONE HYDROCHLORIDE 5 MG/1
10 TABLET ORAL EVERY 4 HOURS PRN
Status: DISCONTINUED | OUTPATIENT
Start: 2022-05-06 | End: 2022-05-07 | Stop reason: HOSPADM

## 2022-05-06 RX ORDER — LIDOCAINE HYDROCHLORIDE 20 MG/ML
INJECTION, SOLUTION INFILTRATION; PERINEURAL PRN
Status: DISCONTINUED | OUTPATIENT
Start: 2022-05-06 | End: 2022-05-06

## 2022-05-06 RX ORDER — LIDOCAINE 40 MG/G
CREAM TOPICAL
Status: DISCONTINUED | OUTPATIENT
Start: 2022-05-06 | End: 2022-05-06 | Stop reason: HOSPADM

## 2022-05-06 RX ORDER — ONDANSETRON 4 MG/1
4 TABLET, ORALLY DISINTEGRATING ORAL EVERY 30 MIN PRN
Status: DISCONTINUED | OUTPATIENT
Start: 2022-05-06 | End: 2022-05-06 | Stop reason: HOSPADM

## 2022-05-06 RX ORDER — DEXAMETHASONE SODIUM PHOSPHATE 10 MG/ML
INJECTION, SOLUTION INTRAMUSCULAR; INTRAVENOUS PRN
Status: DISCONTINUED | OUTPATIENT
Start: 2022-05-06 | End: 2022-05-06

## 2022-05-06 RX ORDER — DEXMEDETOMIDINE HYDROCHLORIDE 4 UG/ML
.1-1.2 INJECTION, SOLUTION INTRAVENOUS CONTINUOUS
Status: DISCONTINUED | OUTPATIENT
Start: 2022-05-06 | End: 2022-05-06 | Stop reason: HOSPADM

## 2022-05-06 RX ORDER — BISACODYL 10 MG
10 SUPPOSITORY, RECTAL RECTAL DAILY PRN
Status: DISCONTINUED | OUTPATIENT
Start: 2022-05-06 | End: 2022-05-07 | Stop reason: HOSPADM

## 2022-05-06 RX ORDER — MULTIVITAMIN,THERAPEUTIC
1 TABLET ORAL DAILY
Status: DISCONTINUED | OUTPATIENT
Start: 2022-05-07 | End: 2022-05-07 | Stop reason: HOSPADM

## 2022-05-06 RX ORDER — HYDROMORPHONE HYDROCHLORIDE 1 MG/ML
0.4 INJECTION, SOLUTION INTRAMUSCULAR; INTRAVENOUS; SUBCUTANEOUS
Status: DISCONTINUED | OUTPATIENT
Start: 2022-05-06 | End: 2022-05-07 | Stop reason: HOSPADM

## 2022-05-06 RX ORDER — HYDROMORPHONE HYDROCHLORIDE 1 MG/ML
0.4 INJECTION, SOLUTION INTRAMUSCULAR; INTRAVENOUS; SUBCUTANEOUS EVERY 5 MIN PRN
Status: DISCONTINUED | OUTPATIENT
Start: 2022-05-06 | End: 2022-05-06 | Stop reason: HOSPADM

## 2022-05-06 RX ORDER — PROPOFOL 10 MG/ML
INJECTION, EMULSION INTRAVENOUS PRN
Status: DISCONTINUED | OUTPATIENT
Start: 2022-05-06 | End: 2022-05-06

## 2022-05-06 RX ORDER — OXYCODONE HYDROCHLORIDE 5 MG/1
5 TABLET ORAL EVERY 4 HOURS PRN
Status: DISCONTINUED | OUTPATIENT
Start: 2022-05-06 | End: 2022-05-07 | Stop reason: HOSPADM

## 2022-05-06 RX ORDER — POLYETHYLENE GLYCOL 3350 17 G/17G
17 POWDER, FOR SOLUTION ORAL DAILY
Status: DISCONTINUED | OUTPATIENT
Start: 2022-05-07 | End: 2022-05-07 | Stop reason: HOSPADM

## 2022-05-06 RX ORDER — ACETAMINOPHEN 500 MG
500 TABLET ORAL EVERY 4 HOURS PRN
Qty: 180 TABLET | Refills: 1 | Status: SHIPPED | OUTPATIENT
Start: 2022-05-06 | End: 2022-05-07

## 2022-05-06 RX ORDER — ASPIRIN 81 MG/1
81 TABLET, CHEWABLE ORAL DAILY
Status: DISCONTINUED | OUTPATIENT
Start: 2022-05-06 | End: 2022-05-07 | Stop reason: HOSPADM

## 2022-05-06 RX ORDER — NALOXONE HYDROCHLORIDE 0.4 MG/ML
0.2 INJECTION, SOLUTION INTRAMUSCULAR; INTRAVENOUS; SUBCUTANEOUS
Status: DISCONTINUED | OUTPATIENT
Start: 2022-05-06 | End: 2022-05-07 | Stop reason: HOSPADM

## 2022-05-06 RX ORDER — MEPERIDINE HYDROCHLORIDE 25 MG/ML
12.5 INJECTION INTRAMUSCULAR; INTRAVENOUS; SUBCUTANEOUS EVERY 5 MIN PRN
Status: DISCONTINUED | OUTPATIENT
Start: 2022-05-06 | End: 2022-05-06 | Stop reason: HOSPADM

## 2022-05-06 RX ORDER — GLYCOPYRROLATE 0.2 MG/ML
INJECTION, SOLUTION INTRAMUSCULAR; INTRAVENOUS PRN
Status: DISCONTINUED | OUTPATIENT
Start: 2022-05-06 | End: 2022-05-06

## 2022-05-06 RX ORDER — ONDANSETRON 2 MG/ML
INJECTION INTRAMUSCULAR; INTRAVENOUS PRN
Status: DISCONTINUED | OUTPATIENT
Start: 2022-05-06 | End: 2022-05-06

## 2022-05-06 RX ORDER — FAMOTIDINE 20 MG/1
20 TABLET, FILM COATED ORAL ONCE
Status: COMPLETED | OUTPATIENT
Start: 2022-05-06 | End: 2022-05-06

## 2022-05-06 RX ORDER — FENTANYL CITRATE 50 UG/ML
50 INJECTION, SOLUTION INTRAMUSCULAR; INTRAVENOUS EVERY 5 MIN PRN
Status: DISCONTINUED | OUTPATIENT
Start: 2022-05-06 | End: 2022-05-06 | Stop reason: HOSPADM

## 2022-05-06 RX ORDER — ONDANSETRON 2 MG/ML
4 INJECTION INTRAMUSCULAR; INTRAVENOUS EVERY 30 MIN PRN
Status: DISCONTINUED | OUTPATIENT
Start: 2022-05-06 | End: 2022-05-06 | Stop reason: HOSPADM

## 2022-05-06 RX ORDER — AMOXICILLIN 250 MG
1 CAPSULE ORAL 2 TIMES DAILY
Status: DISCONTINUED | OUTPATIENT
Start: 2022-05-06 | End: 2022-05-07 | Stop reason: HOSPADM

## 2022-05-06 RX ORDER — CLINDAMYCIN PHOSPHATE 900 MG/50ML
900 INJECTION, SOLUTION INTRAVENOUS SEE ADMIN INSTRUCTIONS
Status: DISCONTINUED | OUTPATIENT
Start: 2022-05-06 | End: 2022-05-06 | Stop reason: HOSPADM

## 2022-05-06 RX ADMIN — SUGAMMADEX 400 MG: 100 INJECTION, SOLUTION INTRAVENOUS at 14:29

## 2022-05-06 RX ADMIN — OLANZAPINE 15 MG: 10 TABLET, FILM COATED ORAL at 21:12

## 2022-05-06 RX ADMIN — HYDROMORPHONE HYDROCHLORIDE 1 MG: 1 INJECTION, SOLUTION INTRAMUSCULAR; INTRAVENOUS; SUBCUTANEOUS at 14:53

## 2022-05-06 RX ADMIN — FAMOTIDINE 20 MG: 20 TABLET, FILM COATED ORAL at 12:07

## 2022-05-06 RX ADMIN — SODIUM CHLORIDE: 9 INJECTION, SOLUTION INTRAVENOUS at 18:59

## 2022-05-06 RX ADMIN — PROPOFOL 200 MG: 10 INJECTION, EMULSION INTRAVENOUS at 14:00

## 2022-05-06 RX ADMIN — PHENYLEPHRINE HYDROCHLORIDE 0.3 MCG/KG/MIN: 10 INJECTION INTRAVENOUS at 14:13

## 2022-05-06 RX ADMIN — OXYCODONE HYDROCHLORIDE 5 MG: 5 TABLET ORAL at 18:13

## 2022-05-06 RX ADMIN — SODIUM CHLORIDE, POTASSIUM CHLORIDE, SODIUM LACTATE AND CALCIUM CHLORIDE: 600; 310; 30; 20 INJECTION, SOLUTION INTRAVENOUS at 12:08

## 2022-05-06 RX ADMIN — MIDAZOLAM 2 MG: 1 INJECTION INTRAMUSCULAR; INTRAVENOUS at 13:53

## 2022-05-06 RX ADMIN — SENNOSIDES AND DOCUSATE SODIUM 1 TABLET: 8.6; 5 TABLET ORAL at 19:26

## 2022-05-06 RX ADMIN — CLINDAMYCIN PHOSPHATE 900 MG: 900 INJECTION, SOLUTION INTRAVENOUS at 13:41

## 2022-05-06 RX ADMIN — DEXAMETHASONE SODIUM PHOSPHATE 10 MG: 10 INJECTION, SOLUTION INTRAMUSCULAR; INTRAVENOUS at 14:00

## 2022-05-06 RX ADMIN — ROCURONIUM BROMIDE 50 MG: 50 INJECTION, SOLUTION INTRAVENOUS at 14:00

## 2022-05-06 RX ADMIN — GLYCOPYRROLATE 0.2 MG: 0.2 INJECTION, SOLUTION INTRAMUSCULAR; INTRAVENOUS at 14:00

## 2022-05-06 RX ADMIN — LIDOCAINE HYDROCHLORIDE 50 MG: 20 INJECTION, SOLUTION INFILTRATION; PERINEURAL at 14:00

## 2022-05-06 RX ADMIN — DEXMEDETOMIDINE HYDROCHLORIDE 0.5 MCG/KG/HR: 400 INJECTION INTRAVENOUS at 13:58

## 2022-05-06 RX ADMIN — SODIUM CHLORIDE, POTASSIUM CHLORIDE, SODIUM LACTATE AND CALCIUM CHLORIDE: 600; 310; 30; 20 INJECTION, SOLUTION INTRAVENOUS at 14:25

## 2022-05-06 RX ADMIN — ONDANSETRON 4 MG: 2 INJECTION INTRAMUSCULAR; INTRAVENOUS at 16:16

## 2022-05-06 RX ADMIN — ACETAMINOPHEN 975 MG: 325 TABLET ORAL at 19:26

## 2022-05-06 RX ADMIN — Medication 100 MG: at 14:00

## 2022-05-06 RX ADMIN — FENTANYL CITRATE 100 MCG: 50 INJECTION, SOLUTION INTRAMUSCULAR; INTRAVENOUS at 14:00

## 2022-05-06 RX ADMIN — PROPOFOL 180 MCG/KG/MIN: 10 INJECTION, EMULSION INTRAVENOUS at 13:57

## 2022-05-06 ASSESSMENT — ACTIVITIES OF DAILY LIVING (ADL)
ADLS_ACUITY_SCORE: 12
ADLS_ACUITY_SCORE: 8
ADLS_ACUITY_SCORE: 12
ADLS_ACUITY_SCORE: 8
ADLS_ACUITY_SCORE: 12
ADLS_ACUITY_SCORE: 12
ADLS_ACUITY_SCORE: 8
ADLS_ACUITY_SCORE: 12
ADLS_ACUITY_SCORE: 8
ADLS_ACUITY_SCORE: 12
ADLS_ACUITY_SCORE: 12
ADLS_ACUITY_SCORE: 8
ADLS_ACUITY_SCORE: 12
ADLS_ACUITY_SCORE: 10
ADLS_ACUITY_SCORE: 8
ADLS_ACUITY_SCORE: 12
ADLS_ACUITY_SCORE: 8
ADLS_ACUITY_SCORE: 12

## 2022-05-06 ASSESSMENT — LIFESTYLE VARIABLES: TOBACCO_USE: 1

## 2022-05-06 NOTE — ED NOTES
He is sleeping at this time. Waiting to hear when surgery is planned for him. He has been NPO since about 2100 last night os greater than 11 hours.

## 2022-05-06 NOTE — BRIEF OP NOTE
[facility] Brief Operative Note    Pre-operative diagnosis: Cervical spondylosis with myelopathy [M47.12]  Spinal stenosis in cervical region [M48.02]  Cord compression (H) [G95.20]  Weakness [R53.1]   Post-operative diagnosis same   Procedure: Procedure(s):  Cervical 5 - Cervical 6 anterior cervical decompression and fusion with plate; Possible Cervical  6 corpectomy   Surgeon(s): Surgeon(s) and Role:     * Laurie Gage MD - Primary     * Ruthann Law PA-C - Assisting   Estimated blood loss: 25 mL    Specimens: * No specimens in log *   Findings: Large disc herniation resulting in severe cord compression.   Implant Name Type Inv. Item Serial No.  Lot No. LRB No. Used Action   GRAFT BONE FIBERS LAM DBM DBF 1ML [id number] - [id number] Bone/Tissue/Biologic GRAFT BONE FIBERS LAM DBM DBF 1ML [id number] [employer]  N/A 1 Implanted   CAGE SPNL 7MM TTN 6D MED CRV [id number] - [id number] Metallic Hardware/[place] CAGE SPNL 7MM TTN 6D MED CRV [id number]  [employer] [id number] N/A 1 Implanted   IMP PLATE CERV MEDT ZEVO 23MM 1 LVL [id number] - [id number] Metallic Hardware/[place] IMP PLATE CERV MEDT ZEVO 23MM 1 LVL [id number]  [employer]  N/A 1 Implanted   IMP SCR MEDT ZEVO 3.5X17MM SD VA [id number] - [id number] Metallic Hardware/[place] IMP SCR MEDT ZEVO 3.5X17MM SD VA [id number]  [employer]  N/A 4 Implanted

## 2022-05-06 NOTE — OR NURSING
Spoke with Monique, pt's guardian, to confirm that she is aware of Ihsan having surgery today, She is aware, and she is our .  Pt. Did sign his consent form, because he is alert and oriented x 4,  and knows why he is having surgery.

## 2022-05-06 NOTE — H&P
[facility]    History and Physical - Hospitalist Service       Date of Admission:  5/5/2022    Assessment & Plan      Ihsan Marcus is a 52 year old male presenting with neck pain, numbness of extremities and incontinence found to have cervical spondylosis with myelopathy, admitted from neurosurgery clinic for urgent surgery planned for 5/6/22.     Cervical spondylosis with myelopathy   Patient presents with three months of neck pain and two weeks of progressively worsening bilateral arm weakness and pain, and bilateral leg weakness and decreasing sensation. Also with two days of bladder incontinence. MRI performed on 4/20 showed large disc extrusion at C5-C6 contributes to severe spinal canal stenosis with cord compression and cord signal abnormality. Patient was seen at neurosurgery clinic today, from which he was sent to the emergency room with plans for urgent surgery on 5/6/22 due to concern for cord compression.   - Neurosurgery consult  - Urgent surgical intervention per neurosurgery planned for 5/6/22  - Hold ASA  - NPO at midnight  - Tylenol with oxycodone PRN for breakthrough pain     Pre-operative clearance  Patient with no prior history of MI or stroke; history of TIA in 2018 currently on aspirin.  Patient notes one isolated episode of chest pain a few days ago that resolved spontaneously, not currently experiencing chest pain.  Patient denies having any shortness of breath at rest or with exertion.  Patient states he is able to go on long walks without any significant shortness of breath. [facility] perioperative calculator predicts 0.3% risk of MI.  EKG shows ST elevation, but without current chest pain and his history of ST elevation on previous EKG (including last in system from 2007) most likely attributed to repolarization. Will obtain serial troponin overnight.  - Serial troponin    History of TIA  Patient had a history of a TIA in 2018, at which time he was placed on aspirin  and a statin.   - Hold ASA for neurosurgery tomorrow    Current smoker  Patient reports currently smoking two packs a day. He notes that his smoking habits have increased over the past few months in response to his pain, and he knows this is not good for him. He started smoking at age [age].  - Nicotine gum  - Nicotine patch, 21    Diet: NPO per Anesthesia Guidelines for Procedure/Surgery Except for: Meds    DVT Prophylaxis: Pneumatic Compression Devices  Harvey Catheter: Not present  Fluids: None   Central Lines: None  Cardiac Monitoring: None  Code Status:  Full code     Clinically Significant Risk Factors Present on Admission                # Platelet Defect: home medication list includes an antiplatelet medication       Disposition Plan   Expected Discharge: Dependent on post-surgical recovery    Anticipated discharge location: home versus U     Ruthann Moreira MD  [facility] - PGY-1  __________________________________________________________________    Chief Complaint   Neck pain and bilateral upper and lower extremity weakness    History of Present Illness   Ihsan Marcus is a 52 year old male with PMH significant for multiple mood disorders and previous TIA presenting with neck pain and bilateral upper and lower extremity weakness from neurosurgery clinic. Patient has started to experience neck pain about three months ago. The neck pain is in the center of his posterior neck and radiates to the right side. He also started to experience pain of his bilateral shoulder blades around that time. Denies any history of trauma to his neck. About two weeks ago, the pain progressively worsened, and has been getting more severe daily. He also notes that two weeks ago, he started to experience a tingling sensation with a sharp aching pain of his bilateral arms, hands and lower legs. This also progressively worsened daily. He started to experience weakness of his hands and arms that is worse  "on the left side. He has decreased sensation of his hands bilaterally, also mildly worse on the left side. He denies pain of his lower extremities, but notes that he started to experience difficulty walking about a week and a half ago due to weakness of the lower extremities bilaterally. He describes the sensation as his \"muscles just don't work the way I want\". He has also experienced progressing loss of sensation of the bilateral lower extremities. He is still able to walk, but has a hard time doing so, and cannot sustain this for long due to weakness. He notes that two days ago, he started to become intermittently incontinent of urine. Denies incontinence of bowels.     Denies fevers, fatigue, headache, shortness of breath, cough, abdominal pain, nausea/vomiting, diarrhea, black or bloody stools, new rash or skin changes. He notes that he did have 10 minutes of mild chest pain two days ago, but denies anything since that time. Patient notes that he has a history of heart attack, but upon close review of his chart, he was admitted for chest pain and cardiac work-up in 2018 which cardiology found unrevealing; he was not treated for MI. He did experience a TIA in 2018.     Review of Systems    The 10 point Review of Systems is negative other than noted in the HPI or here.     Past Medical History    I have reviewed this patient's medical history and updated it with pertinent information if needed. Multiple mood disorders are listed under patient's original problem list; he notes that his mood disorder is bipolar.   Past Medical History:   Diagnosis Date     Cervical spondylosis with myelopathy 5/5/2022     Mild persistent asthma 4/27/2018       Past Surgical History   I have reviewed this patient's surgical history and updated it with pertinent information if needed.  Past Surgical History:   Procedure Laterality Date     COLONOSCOPY N/A 8/13/2021    Procedure: COLONOSCOPY;  Surgeon: Stewart Monsalve MD;  Location: [facility]       Social History   I have reviewed this patient's social history and updated it with pertinent information if needed. Ihsan Mracus  reports that he has been smoking cigarettes; he smokes two packs a day, and has been smoking since age [age]. He has never used smokeless tobacco. He reports that he does not drink alcohol and does not use drugs. He lives in a boarding house with multiple roommates. He notes that he currently works a  job.     Family History   I have reviewed this patient's family history and updated it with pertinent information if needed.  Family History   Problem Relation Age of Onset     Diabetes Mother      Coronary Artery Disease Mother      Coronary Artery Disease Father      Cancer No family hx of        Prior to Admission Medications   Prior to Admission Medications   Prescriptions Last Dose Informant Patient Reported? Taking?   Menthol-Camphor (ICY HOT ADVANCED PAIN RELIEF) 16-11 % CREA   No No   Sig: Externally apply 2 g topically 5 x daily PRN (for pain on shoulder for muscle spasm and joint pain)   OLANZapine (ZYPREXA) 10 MG tablet   Yes No   Sig: TAKE ONE AND ONE HALF TABLETS BY MOUTH EVERY DAY AS NEEDED FOR ANXIETY, AGITATION, SLEEP   acetaminophen (TYLENOL) 500 MG tablet   No No   Sig: Take 1 tablet (500 mg) by mouth every 4 hours as needed for mild pain   aspirin (ASA) 81 MG chewable tablet   No No   Sig: Take 1 tablet (81 mg) by mouth daily   calcium polycarbophil (FIBERCON) 625 MG tablet   No No   Sig: Take 2 tablets (1,250 mg) by mouth daily   cetirizine (ZYRTEC) 10 MG tablet   No No   Sig: Take 1 tablet (10 mg) by mouth daily   diclofenac (VOLTAREN) 1 % topical gel   No No   Sig: Apply 4 g topically 4 times daily As needed for pain in the neck and upper right arm.   famotidine (PEPCID) 20 MG tablet   No No   Sig: Take 1 tablet (20 mg) by mouth 2 times daily as needed (for symptoms of reflux)   gabapentin (NEURONTIN) 100 MG capsule   No No   Sig: Take 1 capsule (100  mg) by mouth 3 times daily as needed for neuropathic pain   guaiFENesin (ROBITUSSIN) 100 MG/5ML liquid   No No   Sig: Take 10 mLs (200 mg) by mouth every 4 hours as needed for cough   ibuprofen (ADVIL/MOTRIN) 600 MG tablet   No No   Sig: Take 1 tablet (600 mg) by mouth every 6 hours as needed for moderate pain   ketoconazole (NIZORAL) 2 % external shampoo   No No   Sig: Apply topically daily as needed for itching or irritation   melatonin 3 MG tablet   Yes No   Sig: Take 6 mg by mouth nightly as needed    rosuvastatin (CRESTOR) 10 MG tablet   No No   Sig: TAKE ONE TABLET BY MOUTH EVERY DAY   salicylic acid (COMPOUND W MAX STRENGTH) 17 % external gel   No No   Sig: Apply topically daily TO LESIONS ON FEET UNTIL CLEAR.   senna-docusate (SENOKOT-S/PERICOLACE) 8.6-50 MG tablet   No No   Sig: Take 1 tablet by mouth daily as needed for constipation   tiZANidine (ZANAFLEX) 4 MG capsule   No No   Sig: Take 1 capsule (4 mg) by mouth 3 times daily As needed for muscle spasm      Facility-Administered Medications: None     Allergies   Allergies   Allergen Reactions     Fish Swelling and Other (See Comments)     Facial swelling and sleepiness     Latex      Other reaction(s): Other (see comments)     Mushroom Hives     Olive Oil Unknown and Other (See Comments)     Peanut (Diagnostic)      Other reaction(s): Edema     Penicillins      Other reaction(s): Other (see comments)     Trazodone      Other reaction(s): Other (see comments)       Physical Exam   Vital Signs: Temp: 97.5  F (36.4  C) Temp src: Oral BP: 126/88 Pulse: 87   Resp: 16 SpO2: 98 %      Weight: 184 lbs 0 oz    General:  Appears well and in no acute distress  Psych:  Alert and oriented to person, place, and time. Able to articulate logical thoughts.   HEENT:  Eyes grossly normal to inspection. Extraocular movements intact. Pupils equal, round, and reactive to light. Mucous membranes moist. No ulcers, erythremia, or lesions noted in the oropharynx.  Cardiovascular:   Regular rate and rhythm, normal S1 and S2 without murmur. No extra heartsounds or friction rub. Radial pulses present and equal bilaterally.  Respiratory:  Lungs clear to auscultation bilaterally. No wheezing or crackles. No prolonged expiration. Symmetrical chest rise.  Musculoskeletal:  No gross extremity deformities. No peripheral edema.  GI:  Soft, non-tender abdomen. No hepatosplenomegaly. Normal active bowel sounds.  Derm:  No suspicious lesions or rashes    Neuro:  Mental status: Awake, alert, attentive, oriented to self, time, place, and circumstance. Language is fluent and coherent with intact comprehension of complex commands, naming and repetition.  Cranial nerves: VFF, PERRL, conjugate gaze, EOMI, facial sensation intact, face symmetric, shoulder shrug strong, tongue/uvula midline, no dysarthria.   Motor: Normal bulk and tone. No abnormal movements. 3/5 strength bilaterally in upper extremities and lower extremities.    Sensory: Diminished sensation of skin overlying hands. Patient denies any sensation below bilateral knees.   Coordination: FNF and HS without ataxia or dysmetria. Rapid alternating movements intact.     Data   Data reviewed today: I reviewed all medications, new labs and imaging results over the last 24 hours.     Recent Labs   Lab 05/05/22  1817   WBC 5.6   HGB 14.6   MCV 92      INR 1.09      POTASSIUM 3.8   CHLORIDE 104   CO2 29   BUN 11   CR 0.93   ANIONGAP 7   SUAD 9.5   GLC 90

## 2022-05-06 NOTE — ANESTHESIA PROCEDURE NOTES
Arterial Line Procedure Note    Pre-Procedure   Staff -        Anesthesiologist:  Kimberly Andrew MD       Performed By: anesthesiologist       Location: OR       Pre-Anesthestic Checklist: patient identified, IV checked, risks and benefits discussed, informed consent, monitors and equipment checked, pre-op evaluation and at physician/surgeon's request  Timeout:       Correct Patient: Yes        Correct Procedure: Yes        Correct Site: Yes        Correct Position: Yes   Line Placement:   This line was placed Post Induction  Procedure   Procedure: arterial line       Laterality: right       Insertion Site: radial.  Sterile Prep        Standard elements of sterile barrier followed       Skin prep: Chloraprep  Insertion/Injection        Catheter Type/Size: 20 G, 12 cm  Narrative         Secured by: suture       Biopatch dressing used.       Complications: None apparent,        Arterial waveform: Yes        IBP within 10% of NIBP: Yes

## 2022-05-06 NOTE — ED NOTES
He is asking when the surgery will be. I talked to him about surgery having a schedule and waiting for a whole to open for him. He still has the numbness but at this time denies any pain. He is resting comfortably and does not appear to be in any distress. He is comfortable with the plan.

## 2022-05-06 NOTE — ED PROVIDER NOTES
EMERGENCY DEPARTMENT NOTE     Name: Ihsan Marcus    Age/Sex: 52 year old male   MRN: [id number]   Evaluation Date & Time:  5/5/2022  7:14 PM    PCP:    Mayda Albarran   ED Provider: Brody Cortez D.O.       CHIEF COMPLAINT    Bilateral arm numbness and Bilateral leg weakness       DIAGNOSIS & DISPOSITION     1. Cervical spondylosis with myelopathy      DISPOSITION: Admit to MedSurg/\[facility] [place] [facility]\""    At the conclusion of the encounter I discussed the results of all of the tests and the disposition. The questions were answered. The patient or family acknowledged understanding and was agreeable with the care plan.    TOTAL CRITICAL CARE TIME (EXCLUDING PROCEDURES): Not applicable    PROCEDURES:   None    EMERGENCY DEPARTMENT COURSE/MEDICAL DECISION MAKING   7:26 PM I met with the patient to gather history and to perform my initial exam.  We discussed treatment options and the plan for care while in the Emergency Department. PPE: Provider wore gloves, N95 mask, and paper mask.     Ihsan Marcus is a 52 year old year old male with a relevant past history of bipolar 1 disorder, drug dependence, gastritis, GERD, asthma, cannabis abuse, TIA, H. Pylori, who presents to this ED by personal vehicle for admission for preoperative evaluation for surgery for cervical myelopathy.    Triage note reviewed:  Pt is c/o bilateral arm and leg numbness for past two months.  Pt has diagnosis of cervical myelopathy and is supposed to have surgery tomorrow.     Triage Assessment     Row Name 05/05/22 1634       Triage Assessment (Adult)    Airway WDL WDL       Respiratory WDL    Respiratory WDL WDL       Skin Circulation/Temperature WDL    Skin Circulation/Temperature WDL WDL       Cardiac WDL    Cardiac WDL WDL       Peripheral/Neurovascular WDL    Peripheral Neurovascular WDL WDL       Cognitive/Neuro/Behavioral WDL    Cognitive/Neuro/Behavioral WDL WDL                Vital signs:/88   Pulse 60   Temp 97.5  F (36.4  C) (Oral)   Resp 16  "  Ht 1.854 m (6' 1\")   Wt 83.5 kg (184 lb)   SpO2 97%   BMI 24.28 kg/m    Pertinent physical exam findings:  Neuro: 4-5 motor strength of upper and lower extremities with weak hand grasp and reports paresthesias of bilateral forearms and lower legs    Diagnostic studies:  Imaging:  No orders to display      Lab:  Labs Ordered and Resulted from Time of ED Arrival to Time of ED Departure   INR - Normal       Result Value    INR 1.09     PARTIAL THROMBOPLASTIN TIME - Normal    aPTT 29     BASIC METABOLIC PANEL - Normal    Sodium 140      Potassium 3.8      Chloride 104      Carbon Dioxide (CO2) 29      Anion Gap 7      Urea Nitrogen 11      Creatinine 0.93      Calcium 9.5      Glucose 90      GFR Estimate >90     MAGNESIUM - Normal    Magnesium 2.1     CBC WITH PLATELETS AND DIFFERENTIAL    WBC Count 5.6      RBC Count 4.80      Hemoglobin 14.6      Hematocrit 44.0      MCV 92      MCH 30.4      MCHC 33.2      RDW 13.8      Platelet Count 276      % Neutrophils 56      % Lymphocytes 36      % Monocytes 7      % Eosinophils 1      % Basophils 0      % Immature Granulocytes 0      NRBCs per 100 WBC 0      Absolute Neutrophils 3.1      Absolute Lymphocytes 2.0      Absolute Monocytes 0.4      Absolute Eosinophils 0.1      Absolute Basophils 0.0      Absolute Immature Granulocytes 0.0      Absolute NRBCs 0.0     PLATELET FUNCTION CLOSURE ADP   COVID-19 VIRUS (CORONAVIRUS) BY PCR   TYPE AND SCREEN, ADULT   ABO/RH TYPE AND SCREEN      Interventions:None  Medical decision making: Discussed case with the residency service and will admit with neurosurgical consultation in a.m. for planned surgery.  Patient will be kept n.p.o. after midnight    ED INTERVENTIONS   Medications - No data to display    DISCHARGE MEDICATIONS        Review of your medicines      UNREVIEWED medicines. Ask your doctor about these medicines      Dose / Directions   acetaminophen 500 MG tablet  Commonly known as: TYLENOL  Used for: Pain      Dose: " 500 mg  Take 1 tablet (500 mg) by mouth every 4 hours as needed for mild pain  Quantity: 180 tablet  Refills: 1     aspirin 81 MG chewable tablet  Commonly known as: ASA  Used for: Transient ischemic attack      Dose: 81 mg  Take 1 tablet (81 mg) by mouth daily  Quantity: 90 tablet  Refills: 3     calcium polycarbophil 625 MG tablet  Commonly known as: FIBERCON  Used for: Constipation, unspecified constipation type      Dose: 2 tablet  Take 2 tablets (1,250 mg) by mouth daily  Quantity: 60 tablet  Refills: 11     cetirizine 10 MG tablet  Commonly known as: zyrTEC  Used for: Seasonal allergic rhinitis due to pollen      Dose: 10 mg  Take 1 tablet (10 mg) by mouth daily  Quantity: 30 tablet  Refills: 11     diclofenac 1 % topical gel  Commonly known as: VOLTAREN  Used for: Spasmodic torticollis      Dose: 4 g  Apply 4 g topically 4 times daily As needed for pain in the neck and upper right arm.  Quantity: 150 g  Refills: 1     famotidine 20 MG tablet  Commonly known as: PEPCID  Used for: Gastroesophageal reflux disease without esophagitis      Dose: 20 mg  Take 1 tablet (20 mg) by mouth 2 times daily as needed (for symptoms of reflux)  Quantity: 180 tablet  Refills: 3     gabapentin 100 MG capsule  Commonly known as: NEURONTIN  Used for: Neck pain, Cervical radiculopathy      Dose: 100 mg  Take 1 capsule (100 mg) by mouth 3 times daily as needed for neuropathic pain  Quantity: 90 capsule  Refills: 0     guaiFENesin 100 MG/5ML liquid  Commonly known as: ROBITUSSIN  Used for: Cough      Dose: 200 mg  Take 10 mLs (200 mg) by mouth every 4 hours as needed for cough  Quantity: 473 mL  Refills: 3     ibuprofen 600 MG tablet  Commonly known as: ADVIL/MOTRIN  Used for: Spasmodic torticollis      Dose: 600 mg  Take 1 tablet (600 mg) by mouth every 6 hours as needed for moderate pain  Quantity: 90 tablet  Refills: 0     Icy Hot Advanced Pain Relief 16-11 % Crea  Used for: Spasmodic torticollis  Generic drug: Menthol-Camphor       Dose: 2 g  Externally apply 2 g topically 5 x daily PRN (for pain on shoulder for muscle spasm and joint pain)  Quantity: 56 g  Refills: 0     ketoconazole 2 % external shampoo  Commonly known as: NIZORAL  Used for: Tinea versicolor      Apply topically daily as needed for itching or irritation  Quantity: 100 mL  Refills: 1     OLANZapine 10 MG tablet  Commonly known as: zyPREXA      TAKE ONE AND ONE HALF TABLETS BY MOUTH EVERY DAY AS NEEDED FOR ANXIETY, AGITATION, SLEEP  Refills: 0     rosuvastatin 10 MG tablet  Commonly known as: CRESTOR  Used for: Mixed hyperlipidemia      TAKE ONE TABLET BY MOUTH EVERY DAY  Quantity: 90 tablet  Refills: 3     salicylic acid 17 % external gel  Commonly known as: COMPOUND W MAX STRENGTH  Used for: Callus of foot      Apply topically daily TO LESIONS ON FEET UNTIL CLEAR.  Quantity: 7 g  Refills: 1     senna-docusate 8.6-50 MG tablet  Commonly known as: SENOKOT-S/PERICOLACE  Used for: Slow transit constipation      Dose: 1 tablet  Take 1 tablet by mouth daily as needed for constipation  Quantity: 90 tablet  Refills: 2     tiZANidine 4 MG capsule  Commonly known as: ZANAFLEX  Used for: Spasmodic torticollis      Dose: 4 mg  Take 1 capsule (4 mg) by mouth 3 times daily As needed for muscle spasm  Quantity: 20 capsule  Refills: 0        CONTINUE these medicines which have NOT CHANGED      Dose / Directions   melatonin 3 MG tablet      Dose: 6 mg  Take 6 mg by mouth nightly as needed  Refills: 0              INFORMATION SOURCE AND LIMITATIONS    History/Exam limitations: none  Patient information was obtained from: the patient   Use of : N/A    HISTORY OF PRESENT ILLNESS   Ihsan Marcus is a 52 year old year old male with a relevant past history of schizoaffectivebipolar 1 disorder,  GERD, asthma,  who presents to this ED by group homevehicle for evaluation of preoperative admission for cervical myelopathy  The patient presents today with 2 months progressive BU/LE  weakness and numbness.  Patient has been followed by neurosurgery with MRI performed April 20 which showed disc extrusion C5-C6 but has had progressive weakness in arms and legs and was referred for admission for urgent surgical intervention planned for 5/6/2022  No other complaints.         REVIEW OF SYSTEMS:   Constitutional: Negative for  fever.   HENT: Negative for URI symptoms or sore throat.    Cardiac: Negative for  chest pain,palpitations, near syncope or syncope  Respiratory: Negative for cough and shortness of breath.    Gastrointestinal: Negative for abdominal pain, nausea, vomiting, constipation, diarrhea, rectal bleeding or melena.  Genitourinary: Negative for dysuria, flank pain and hematuria.   Musculoskeletal: Negative for back pain.   Skin: Negative for  rash  Neurological: Negative for dizziness, headache, syncope, speech difficulty, imbalance with walking. Positive for bilateral upper extremity weakness, numbness.  Hematological: Negative for adenopathy. Does not bruise/bleed easily.   Psychiatric/Behavioral: Negative for confusion.       PATIENT HISTORY     Past Medical History:   Diagnosis Date     Mild persistent asthma 4/27/2018     Patient Active Problem List   Diagnosis     Schizophrenia (H)     Positive reaction to tuberculin skin test     Anorexia     Bipolar I disorder, single manic episode (H)     Borderline intellectual functioning     Candidiasis of esophagus (H)     Constipation     Delusional disorder (H)     Drug dependence, in remission (H)     Drug induced subacute dyskinesia     Gastritis due to Helicobacter species     Gastroesophageal reflux disease without esophagitis     Hyperlipidemia     Mild persistent asthma     Nocturnal enuresis     Nondependent cannabis abuse     Pityriasis versicolor     Polyp of colon     Psychophysiological insomnia     Rectal hemorrhage     Rhinitis, allergic     Schizoaffective disorder, bipolar type (H)     Transient ischemic attack     Urinary  urgency     Verruca plantaris     History of Helicobacter pylori infection     Past Surgical History:   Procedure Laterality Date     COLONOSCOPY N/A 8/13/2021    Procedure: COLONOSCOPY;  Surgeon: Stewart Monsalve MD;  Location: [facility]     Social Histrory  Smoking:None  Alcohol Use:None  Allergies   Allergen Reactions     Fish Swelling and Other (See Comments)     Facial swelling and sleepiness     Latex      Other reaction(s): Other (see comments)     Mushroom Hives     Olive Oil Unknown and Other (See Comments)     Peanut (Diagnostic)      Other reaction(s): Edema     Penicillins      Other reaction(s): Other (see comments)     Trazodone      Other reaction(s): Other (see comments)         OUTPATIENT MEDICATIONS     New Prescriptions    No medications on file      Vitals:    05/05/22 1633   BP: 126/88   Pulse: 87   Resp: 16   Temp: 97.5  F (36.4  C)   TempSrc: Oral   SpO2: 98%   Weight: 83.5 kg (184 lb)       Physical Exam   Constitutional: Oriented to person, place, and time. Appears well-developed and well-nourished.   HEENT:    Head: Atraumatic.   Neck: Normal range of motion. Neck supple.   Cardiovascular: Normal rate, regular rhythm and normal heart sounds.    Pulmonary/Chest: Normal effort  and breath sounds normal.   Abdominal: Soft. Bowel sounds are normal.   Musculoskeletal: Normal range of motion.   Neurological: Alert and oriented to person, place, and time.   strength weak bilaterally.  4 out of 5 motor strength of upper and lower extremities symmetric.  Patient reports paresthesias decreased sensation to forearm and lower legs no cranial nerve deficit .   Skin: Skin is warm and dry.   Psychiatric: Normal mood and affect. Behavior is normal. Thought content normal.       DIAGNOSTICS    LABORATORY FINDINGS (REVIEWED AND INTERPRETED):  Labs Ordered and Resulted from Time of ED Arrival to Time of ED Departure   INR - Normal       Result Value    INR 1.09     PARTIAL THROMBOPLASTIN TIME - Normal    aPTT  29     BASIC METABOLIC PANEL - Normal    Sodium 140      Potassium 3.8      Chloride 104      Carbon Dioxide (CO2) 29      Anion Gap 7      Urea Nitrogen 11      Creatinine 0.93      Calcium 9.5      Glucose 90      GFR Estimate >90     MAGNESIUM - Normal    Magnesium 2.1     CBC WITH PLATELETS AND DIFFERENTIAL    WBC Count 5.6      RBC Count 4.80      Hemoglobin 14.6      Hematocrit 44.0      MCV 92      MCH 30.4      MCHC 33.2      RDW 13.8      Platelet Count 276      % Neutrophils 56      % Lymphocytes 36      % Monocytes 7      % Eosinophils 1      % Basophils 0      % Immature Granulocytes 0      NRBCs per 100 WBC 0      Absolute Neutrophils 3.1      Absolute Lymphocytes 2.0      Absolute Monocytes 0.4      Absolute Eosinophils 0.1      Absolute Basophils 0.0      Absolute Immature Granulocytes 0.0      Absolute NRBCs 0.0     PLATELET FUNCTION CLOSURE ADP   COVID-19 VIRUS (CORONAVIRUS) BY PCR   TYPE AND SCREEN, ADULT   ABO/RH TYPE AND SCREEN         IMAGING (REVIEWED AND INTERPRETED):  No orders to display           ECG (REVIEWED AND INTERPRETED):   ECG:   Performed at: 21:45:00  HR:  69 bpm  Rhythm: Sinus  Axis: 36  40  -2  QRS duration: 98 ms  QTC: 402 ms    Interpretation: Normal sinus rhythm with early repolarization  Compared to most recent ECG from: 2007 no acute change    I have reviewed the patient's ECG, with comments made as listed above. Please see scanned image for full interpretation.         I, Phan Thrasher, am serving as a scribe to document services personally performed by Brody Cortez D.O., based on my observation and the provider s statements to me.    I, Brody Cortez D.O., attest that Phan Thrasher, is acting in a scribe capacity, has observed my performance of the services and has documented them in accordance with my direction.    Brody Cortez D.O.  EMERGENCY MEDICINE   05/05/22  [facility] EMERGENCY DEPARTMENT  [facility]  [id number]  [phone]  Dept: [phone]     Brody Cortez DO  05/06/22 0206

## 2022-05-06 NOTE — ANESTHESIA PREPROCEDURE EVALUATION
Anesthesia Pre-Procedure Evaluation    Patient: Ihsan Marcus   MRN: [id number] : 1970        Preoperative Diagnosis: Colon cancer screening [Z12.11]   Procedure : Procedure(s):  C5-6 anterior cervical decompression and fusion with plate     Past Medical History:   Diagnosis Date     Cervical spondylosis with myelopathy 2022     Mild persistent asthma 2018      Past Surgical History:   Procedure Laterality Date     COLONOSCOPY N/A 2021    Procedure: COLONOSCOPY;  Surgeon: Stewart Monsalve MD;  Location: [facility]      Allergies   Allergen Reactions     Fish Swelling and Other (See Comments)     Facial swelling and sleepiness     Latex      Other reaction(s): Other (see comments)     Mushroom Hives     Olive Oil Unknown and Other (See Comments)     Peanut (Diagnostic)      Other reaction(s): Edema     Penicillins      Other reaction(s): Other (see comments)     Trazodone      Other reaction(s): Other (see comments)      Social History     Tobacco Use     Smoking status: Current Every Day Smoker     Packs/day: 0.50     Years: 23.00     Pack years: 11.50     Types: Cigarettes     Smokeless tobacco: Never Used     Tobacco comment: Slowing down a lot   Substance Use Topics     Alcohol use: No      Wt Readings from Last 1 Encounters:   22 83.5 kg (184 lb)        Anesthesia Evaluation   Pt has had prior anesthetic. Type: General.    No history of anesthetic complications       ROS/MED HX  ENT/Pulmonary:     (+) allergic rhinitis, tobacco use (& cannibis use), Current use, 12  Pack-Year Hx,  Mild Persistent, asthma     Neurologic:       Cardiovascular:     (+) Dyslipidemia -----    METS/Exercise Tolerance:     Hematologic:  - neg hematologic  ROS     Musculoskeletal:  - neg musculoskeletal ROS     GI/Hepatic:     (+) GERD,     Renal/Genitourinary:  - neg Renal ROS     Endo:  - neg endo ROS     Psychiatric/Substance Use: Comment: Delusional disorder    (+) psychiatric history schizophrenia, bipolar  and other (comment)     Infectious Disease: Comment: H Pylori Infection history  - neg infectious disease ROS     Malignancy:  - neg malignancy ROS     Other:            Physical Exam    Airway        Mallampati: II   TM distance: > 3 FB   Neck ROM: full   Mouth opening: > 3 cm    Respiratory Devices and Support         Dental  no notable dental history         Cardiovascular   cardiovascular exam normal          Pulmonary   pulmonary exam normal                OUTSIDE LABS:  CBC:   Lab Results   Component Value Date    WBC 5.6 05/05/2022    WBC 6.3 12/07/2021    HGB 14.6 05/05/2022    HGB 12.5 (L) 12/07/2021    HCT 44.0 05/05/2022    HCT 38.4 (L) 12/07/2021     05/05/2022     12/07/2021     BMP:   Lab Results   Component Value Date     05/05/2022     12/07/2021    POTASSIUM 3.8 05/05/2022    POTASSIUM 3.8 12/07/2021    CHLORIDE 104 05/05/2022    CHLORIDE 111 (H) 12/07/2021    CO2 29 05/05/2022    CO2 29 12/07/2021    BUN 11 05/05/2022    BUN 12 12/07/2021    CR 0.93 05/05/2022    CR 0.97 12/07/2021    GLC 90 05/05/2022    GLC 91 12/07/2021     COAGS:   Lab Results   Component Value Date    PTT 29 05/05/2022    INR 1.09 05/05/2022     POC: No results found for: BGM, HCG, HCGS  HEPATIC:   Lab Results   Component Value Date    ALBUMIN 3.5 12/07/2021    PROTTOTAL 6.9 12/07/2021    ALT 22 12/07/2021    AST 16 12/07/2021    GGT 13 12/07/2007    ALKPHOS 75 12/07/2021    BILITOTAL 0.5 12/07/2021     OTHER:   Lab Results   Component Value Date    A1C 6 (H) 12/04/2020    SUAD 9.5 05/05/2022    MAG 2.1 05/05/2022    TSH 0.6 09/30/2020       Anesthesia Plan    ASA Status:  3      Anesthesia Type: General.     - Airway: ETT         Techniques and Equipment:     - Lines/Monitors: Arterial Line     Consents    Anesthesia Plan(s) and associated risks, benefits, and realistic alternatives discussed. Questions answered and patient/representative(s) expressed understanding.     - Discussed: Risks, Benefits  and Alternatives for BOTH SEDATION and the PROCEDURE were discussed     - Discussed with:  Patient         Postoperative Care       PONV prophylaxis: Ondansetron (or other 5HT-3), Dexamethasone or Solumedrol     Comments:                    Kimberly Andrew MD

## 2022-05-06 NOTE — PROGRESS NOTES
[facility]    Medicine Progress Note - Hospitalist Service    Date of Admission:  5/5/2022    Assessment & Plan            Ihsan Marcus is a 52 year old male presenting with neck pain, numbness of extremities and incontinence found to have cervical spondylosis with myelopathy, admitted from neurosurgery clinic for urgent surgery planned for 5/6/22.      Cervical spondylosis with myelopathy   Patient presents with three months of neck pain and two weeks of progressively worsening bilateral arm weakness and pain, and bilateral leg weakness and decreasing sensation. Also with two days of bladder incontinence. MRI performed on 4/20 showed large disc extrusion at C5-C6 contributes to severe spinal canal stenosis with cord compression and cord signal abnormality. Patient was seen at neurosurgery clinic 5/5, from which he was sent to the emergency room with plans for urgent surgery on 5/6/22 due to concern for cord compression.   - Neurosurgery consulted; surgery 5/6  - Urgent surgical intervention per neurosurgery planned for 5/6/22  - Hold ASA  - NPO at midnight  - Tylenol with oxycodone PRN for breakthrough pain      Pre-operative clearance  Patient with no prior history of MI or stroke; history of TIA in 2018 currently on aspirin.  Patient notes one isolated episode of chest pain a few days ago that resolved spontaneously, not currently experiencing chest pain.  Patient denies having any shortness of breath at rest or with exertion.  Patient states he is able to go on long walks without any significant shortness of breath. [facility] perioperative calculator predicts 0.3% risk of MI.  EKG shows ST elevation, but without current chest pain and his history of ST elevation on previous EKG (including last in system from 2007) most likely attributed to repolarization. Trops negative. Cleared for surgery.     History of TIA  Patient had a history of a TIA in 2018, at which time he was placed on aspirin  and a statin.   - Hold ASA for neurosurgery tomorrow. Resume per neurosurgery guidance.     Current smoker  Patient reports currently smoking two packs a day. He notes that his smoking habits have increased over the past few months in response to his pain, and he knows this is not good for him. He started smoking at age [age].  - Nicotine gum  - Nicotine patch, 21    Hx Bipolar:  - Continue PTA Zyprexa      Diet: NPO per Anesthesia Guidelines for Procedure/Surgery Except for: Meds    DVT Prophylaxis: Pneumatic Compression Devices  Harvey Catheter: Not present  Fluids: None   Central Lines: None  Cardiac Monitoring: None  Code Status:  Full code          Disposition Plan   Expected Discharge:  unknown at this time  Anticipated discharge location:  Awaiting care coordination huddle  Delays:           The patient's care was discussed with Dr. Rosenstein Lucas John Gregory Kuzj, MD  Hospitalist Service  [facility]  Securely message with the Vocera Web Console (learn more here)  Text page via [employer] Paging/Directory         Clinically Significant Risk Factors Present on Admission                # Platelet Defect: home medication list includes an antiplatelet medication       ______________________________________________________________________    Interval History   Patient denies any new concerns, other than wanting his phone  so that he is something to watch him his phone like TV.    Data reviewed today: I reviewed all medications, new labs and imaging results over the last 24 hours.     Physical Exam   Vital Signs: Temp: 98.3  F (36.8  C) Temp src: Oral BP: 119/81 Pulse: 65   Resp: 18 SpO2: 98 % O2 Device: None (Room air)    Weight: 184 lbs 0 oz  General:  Appears well and in no acute distress  HEENT:  Eyes grossly normal to inspection. Extraocular movements intact. Pupils equal, round, and reactive to light.  Cardiovascular:  Regular rate and rhythm  Respiratory:  Lungs clear to  auscultation bilaterally. Musculoskeletal:  No gross extremity deformities. No peripheral edema.  Derm:  No suspicious lesions or rashes     Neuro:  Mental status: Awake, alert, attentive, oriented to self, time, place, and circumstance. Language is fluent and coherent with intact comprehension of complex commands, naming and repetition.  Cranial nerves: shoulder shrug strong, tongue/uvula midline, no dysarthria.   Motor: Normal bulk and tone. No abnormal movements. 3/5 strength bilaterally in upper extremities. Decreased strength L>R lower extremities.  Sensory: Diminished sensation of skin overlying hands. Patient denies any sensation below bilateral knees.   Coordination: FNF and HS without ataxia or dysmetria. Rapid alternating movements intact.     Data

## 2022-05-06 NOTE — ANESTHESIA PROCEDURE NOTES
Airway       Patient location during procedure: OR       Procedure Start/Stop Times: 5/6/2022 2:03 PM  Staff -        Anesthesiologist:  Kimberly Anrdew MD       CRNA: Ismael Kelly APRN CRNA       Other Anesthesia Staff: Veronique Lozano       Performed By: CHEN and with CRNAs       Procedure performed by resident/fellow/CRNA in presence of a teaching physician.    Consent for Airway        Urgency: elective  Indications and Patient Condition       Indications for airway management: ralph-procedural       Induction type:intravenous       Mask difficulty assessment: 2 - vent by mask + OA or adjuvant +/- NMBA    Final Airway Details       Final airway type: endotracheal airway       Successful airway: ETT - single and Oral  Endotracheal Airway Details        ETT size (mm): 8.0       Cuffed: yes       Successful intubation technique: video laryngoscopy       VL Blade Size: Glidescope 4       Grade View of Cords: 1       Adjucts: stylet       Position: Center       Measured from: gums/teeth       Secured at (cm): 22       Bite block used: Soft    Post intubation assessment        Placement verified by: capnometry, equal breath sounds and chest rise        Number of attempts at approach: 1       Number of other approaches attempted: 0       Secured with: silk tape       Ease of procedure: easy       Dentition: Intact and Unchanged    Medication(s) Administered   Medication Administration Time: 5/6/2022 2:03 PM

## 2022-05-06 NOTE — PHARMACY-ADMISSION MEDICATION HISTORY
Pharmacy Note - Admission Medication History    Pertinent Provider Information: none     ______________________________________________________________________    Prior To Admission (PTA) med list completed and updated in EMR.       PTA Med List   Medication Sig Last Dose     acetaminophen (TYLENOL) 500 MG tablet Take 1 tablet (500 mg) by mouth every 4 hours as needed for mild pain not recently     aspirin (ASA) 81 MG chewable tablet Take 1 tablet (81 mg) by mouth daily 5/5/2022 at am     calcium polycarbophil (FIBERCON) 625 MG tablet Take 2 tablets (1,250 mg) by mouth daily 5/5/2022 at am     cetirizine (ZYRTEC) 10 MG tablet Take 1 tablet (10 mg) by mouth daily 5/5/2022 at am     famotidine (PEPCID) 20 MG tablet Take 1 tablet (20 mg) by mouth 2 times daily as needed (for symptoms of reflux) not recently     gabapentin (NEURONTIN) 100 MG capsule Take 1 capsule (100 mg) by mouth 3 times daily as needed for neuropathic pain not recently     guaiFENesin (ROBITUSSIN) 100 MG/5ML liquid Take 10 mLs (200 mg) by mouth every 4 hours as needed for cough not recently     ibuprofen (ADVIL/MOTRIN) 600 MG tablet Take 1 tablet (600 mg) by mouth every 6 hours as needed for moderate pain not recently     ketoconazole (NIZORAL) 2 % external shampoo Apply topically daily as needed for itching or irritation not recently     melatonin 3 MG tablet Take 6 mg by mouth nightly as needed  not recently     Menthol-Camphor (ICY HOT ADVANCED PAIN RELIEF) 16-11 % CREA Externally apply 2 g topically 5 x daily PRN (for pain on shoulder for muscle spasm and joint pain) not recently     OLANZapine (ZYPREXA) 15 MG tablet Take 15 mg by mouth At Bedtime 5/4/2022 at pm     rosuvastatin (CRESTOR) 10 MG tablet TAKE ONE TABLET BY MOUTH EVERY DAY 5/5/2022 at am     salicylic acid (COMPOUND W MAX STRENGTH) 17 % external gel Apply topically daily TO LESIONS ON FEET UNTIL CLEAR. (Patient taking differently: Apply topically daily as needed TO LESIONS ON FEET  UNTIL CLEAR.) not recently     senna-docusate (SENOKOT-S/PERICOLACE) 8.6-50 MG tablet Take 1 tablet by mouth daily as needed for constipation 5/5/2022 at am     tiZANidine (ZANAFLEX) 4 MG capsule Take 1 capsule (4 mg) by mouth 3 times daily As needed for muscle spasm not recently       Information source(s): Patient, Clinic records and [facility]  Method of interview communication: in-person    Summary of Changes to PTA Med List  New: none  Discontinued: voltaren gel  Changed: compund W gel to prn,     Patient was asked about OTC/herbal products specifically.  PTA med list reflects this.    In the past week, patient estimated taking medication this percent of the time:  greater than 90%.    Allergies were reviewed, assessed, and updated with the patient.      Patient does not anticipate needing any multi-use medications during admission.    The information provided in this note is only as accurate as the sources available at the time of the update(s).    Thank you for the opportunity to participate in the care of this patient.    [facility], located within [facility]  5/5/2022 9:22 PM

## 2022-05-06 NOTE — ANESTHESIA CARE TRANSFER NOTE
Patient: Ihsan Marcus    Procedure: Procedure(s):  Cervical 5 - Cervical 6 anterior cervical decompression and fusion with plate; Possible Cervical  6 corpectomy       Diagnosis: Cervical spondylosis with myelopathy [M47.12]  Spinal stenosis in cervical region [M48.02]  Cord compression (H) [G95.20]  Weakness [R53.1]  Diagnosis Additional Information: No value filed.    Anesthesia Type:   General     Note:    Oropharynx: oropharynx clear of all foreign objects and spontaneously breathing  Level of Consciousness: drowsy  Oxygen Supplementation: face mask  Level of Supplemental Oxygen (L/min / FiO2): 6  Independent Airway: airway patency satisfactory and stable  Dentition: dentition unchanged  Vital Signs Stable: post-procedure vital signs reviewed and stable  Report to RN Given: handoff report given  Patient transferred to: PACU    Handoff Report: Identifed the Patient, Identified the Reponsible Provider, Reviewed the pertinent medical history, Discussed the surgical course, Reviewed Intra-OP anesthesia mangement and issues during anesthesia, Set expectations for post-procedure period and Allowed opportunity for questions and acknowledgement of understanding      Vitals:  Vitals Value Taken Time   /76 05/06/22 1646   Temp 36.2  C (97.2  F) 05/06/22 1645   Pulse 51 05/06/22 1647   Resp 21 05/06/22 1647   SpO2 100 % 05/06/22 1647   Vitals shown include unvalidated device data.    Electronically Signed By: TIERRA Cordova CRNA  May 6, 2022  4:48 PM

## 2022-05-06 NOTE — PROGRESS NOTES
Neurosurgery Progress Note:  5/6/2022     A/P: Mr. Marcus is a pleasant 52 year old right handed male that has progressive bilateral upper extremity and lower extremity weakness, gait instability, urinary and bowel incontinence. Seen in office yesterday noted with severe spinal stenosis worst at C5-C6 with disc herniation . Currently on ASA with history of TIA in past. Will require further cervical fusion in the future.      Plan:  Surgical intervention today to include C5-C6 anterior cervical discectomy and fusion   NPO   Facial droop more noticeable today patient noted that it he has had facial droop for months unsure when it started will plan to consult neurology postoperatively with possible MRI brain      Neurosurgery Attending: The patient's clinical examination, laboratory data, and plan was discussed with Dr. Gage     HPI: 53 yo male with a history of bipolar 1 disorder, chemical dependence, gastritis, GERD, asthma, TIA who resides in a group home presents with 3 months of progressive cervical radiculopathy and myelopathy symptoms.  He thinks his symptoms began sometime around the beginning of February.  Noted mostly pain in his neck into his right shoulder and arm  Seen in the ED on February 6, 2022 and was given a trigger point injection with improvement of his symptoms.  Since that time he is progressively worsened.  He now describes having increased pain and numbness in his bilateral arms as well as weakness particularly of  but notices arms and legs are weak as well. Right  worse then left.  Also is dropping objects frequently.  He is having difficulty ambulating upstairs.  He also has imbalance with walking.  More recently he has developed both urinary and bowel incontinence.  Has progressed within the last weeks to few days.     S:   Continued complaint of upper and lower extremity weakness notes worsening right hand strength and numbness since yesterday, continued complaint of      O:  BP  "128/72   Pulse 67   Temp 97.5  F (36.4  C) (Oral)   Resp 17   Ht 1.854 m (6' 1\")   Wt 83.5 kg (184 lb)   SpO2 98%   BMI 24.28 kg/m         General: Awake, alert, sitting upright in bed    Motor: normal bulk and tone     Strength:   Mental status: alert and oriented x3 speech clear and appropriate   Cranial nerves: II-XII intact, left facial droop  Motor strength: weakness throughout   Biceps: 4+/5 right, 4+/5 left   Wrist extensors:4+/5 right, 4+/5 left   Triceps: 4+/5 right, 4+/5 left   Deltoids: 4+/5 right, 4+/5 left   Finger flexion: 3+/5 right, 4-/5 left   Finger abduction:3+/5 right, 4-/5 left   Hand : 3/5 right, 4-/5 left   Hip flexors: 1-2/5 right, 4/5 left   Quadriceps: 4/5 right, 4+/5 left  Ankle dorsiflexion: 4/5 right, 4/5 left    Ankle plantar flexion:4/5 right, 4/5 left   Extensor hallicus longus: 2/5 right, 4/5 left    Sensation: decreased to LT throughout      KISHOR Black [facility] Neurosurgery  O: [phone]  "

## 2022-05-07 ENCOUNTER — APPOINTMENT (OUTPATIENT)
Dept: OCCUPATIONAL THERAPY | Facility: HOSPITAL | Age: 52
DRG: 472 | End: 2022-05-07
Payer: MEDICARE

## 2022-05-07 ENCOUNTER — APPOINTMENT (OUTPATIENT)
Dept: MRI IMAGING | Facility: HOSPITAL | Age: 52
DRG: 472 | End: 2022-05-07
Attending: PSYCHIATRY & NEUROLOGY
Payer: MEDICARE

## 2022-05-07 ENCOUNTER — APPOINTMENT (OUTPATIENT)
Dept: RADIOLOGY | Facility: HOSPITAL | Age: 52
DRG: 472 | End: 2022-05-07
Attending: NURSE PRACTITIONER
Payer: MEDICARE

## 2022-05-07 ENCOUNTER — APPOINTMENT (OUTPATIENT)
Dept: PHYSICAL THERAPY | Facility: HOSPITAL | Age: 52
DRG: 472 | End: 2022-05-07
Attending: PHYSICIAN ASSISTANT
Payer: MEDICARE

## 2022-05-07 VITALS
HEART RATE: 93 BPM | SYSTOLIC BLOOD PRESSURE: 150 MMHG | RESPIRATION RATE: 18 BRPM | WEIGHT: 184 LBS | OXYGEN SATURATION: 95 % | DIASTOLIC BLOOD PRESSURE: 71 MMHG | TEMPERATURE: 98.2 F | HEIGHT: 73 IN | BODY MASS INDEX: 24.39 KG/M2

## 2022-05-07 PROCEDURE — 99238 HOSP IP/OBS DSCHRG MGMT 30/<: CPT | Mod: GC | Performed by: STUDENT IN AN ORGANIZED HEALTH CARE EDUCATION/TRAINING PROGRAM

## 2022-05-07 PROCEDURE — 70553 MRI BRAIN STEM W/O & W/DYE: CPT

## 2022-05-07 PROCEDURE — 250N000013 HC RX MED GY IP 250 OP 250 PS 637: Performed by: PHYSICIAN ASSISTANT

## 2022-05-07 PROCEDURE — 999N000065 XR CERVICAL SPINE 2/3 VWS

## 2022-05-07 PROCEDURE — 97535 SELF CARE MNGMENT TRAINING: CPT | Mod: GO

## 2022-05-07 PROCEDURE — 250N000013 HC RX MED GY IP 250 OP 250 PS 637

## 2022-05-07 PROCEDURE — 97166 OT EVAL MOD COMPLEX 45 MIN: CPT | Mod: GO

## 2022-05-07 PROCEDURE — 70544 MR ANGIOGRAPHY HEAD W/O DYE: CPT

## 2022-05-07 PROCEDURE — 97161 PT EVAL LOW COMPLEX 20 MIN: CPT | Mod: GP

## 2022-05-07 PROCEDURE — 97530 THERAPEUTIC ACTIVITIES: CPT | Mod: GP

## 2022-05-07 PROCEDURE — 255N000002 HC RX 255 OP 636: Performed by: FAMILY MEDICINE

## 2022-05-07 PROCEDURE — A9585 GADOBUTROL INJECTION: HCPCS | Performed by: FAMILY MEDICINE

## 2022-05-07 PROCEDURE — 70549 MR ANGIOGRAPH NECK W/O&W/DYE: CPT

## 2022-05-07 PROCEDURE — 97116 GAIT TRAINING THERAPY: CPT | Mod: GP

## 2022-05-07 PROCEDURE — 99222 1ST HOSP IP/OBS MODERATE 55: CPT | Performed by: PSYCHIATRY & NEUROLOGY

## 2022-05-07 RX ORDER — NICOTINE 21 MG/24HR
1 PATCH, TRANSDERMAL 24 HOURS TRANSDERMAL DAILY
Qty: 30 PATCH | Refills: 0 | Status: SHIPPED | OUTPATIENT
Start: 2022-05-08 | End: 2023-05-25

## 2022-05-07 RX ORDER — ASPIRIN 81 MG/1
81 TABLET, CHEWABLE ORAL DAILY
Qty: 90 TABLET | Refills: 3 | COMMUNITY
Start: 2022-05-07 | End: 2023-01-02

## 2022-05-07 RX ORDER — OXYCODONE HYDROCHLORIDE 5 MG/1
5 TABLET ORAL EVERY 4 HOURS PRN
Qty: 20 TABLET | Refills: 0 | Status: SHIPPED | OUTPATIENT
Start: 2022-05-07 | End: 2022-06-14

## 2022-05-07 RX ORDER — GADOBUTROL 604.72 MG/ML
10 INJECTION INTRAVENOUS ONCE
Status: COMPLETED | OUTPATIENT
Start: 2022-05-07 | End: 2022-05-07

## 2022-05-07 RX ORDER — MULTIVITAMIN,THERAPEUTIC
1 TABLET ORAL DAILY
Qty: 30 TABLET | Refills: 0 | Status: SHIPPED | OUTPATIENT
Start: 2022-05-08 | End: 2022-05-09

## 2022-05-07 RX ORDER — ACETAMINOPHEN 325 MG/1
975 TABLET ORAL EVERY 8 HOURS
COMMUNITY
Start: 2022-05-07 | End: 2022-10-25

## 2022-05-07 RX ADMIN — NICOTINE 1 PATCH: 21 PATCH, EXTENDED RELEASE TRANSDERMAL at 00:32

## 2022-05-07 RX ADMIN — THERA TABS 1 TABLET: TAB at 09:56

## 2022-05-07 RX ADMIN — ACETAMINOPHEN 975 MG: 325 TABLET ORAL at 09:55

## 2022-05-07 RX ADMIN — SENNOSIDES AND DOCUSATE SODIUM 1 TABLET: 8.6; 5 TABLET ORAL at 09:56

## 2022-05-07 RX ADMIN — GADOBUTROL 10 ML: 604.72 INJECTION INTRAVENOUS at 13:07

## 2022-05-07 RX ADMIN — ACETAMINOPHEN 975 MG: 325 TABLET ORAL at 03:49

## 2022-05-07 ASSESSMENT — ACTIVITIES OF DAILY LIVING (ADL)
ADLS_ACUITY_SCORE: 7
ADLS_ACUITY_SCORE: 8
ADLS_ACUITY_SCORE: 7
ADLS_ACUITY_SCORE: 10
ADLS_ACUITY_SCORE: 8
ADLS_ACUITY_SCORE: 7
ADLS_ACUITY_SCORE: 7
ADLS_ACUITY_SCORE: 10
ADLS_ACUITY_SCORE: 8
ADLS_ACUITY_SCORE: 7
ADLS_ACUITY_SCORE: 10
ADLS_ACUITY_SCORE: 8

## 2022-05-07 NOTE — PROGRESS NOTES
"Neurosurgery Progress Note  May 7, 2022    A/P: Ihsan Marcus is POD #1 C5-6 ACDF with Dr Gage for cervical spondylosis with myelopathy, spinal stenosis, cord compression.     Ihsan tells me he feels great and is ready to go home. He denies pain. He feels his arms and legs are better in terms of sensation and mobility.     We need PT/OT eval this am. He was myelopathic pre-op. Need to determine ability to be up moving and assure returning to group home is appropriate. He also is still in need of [place] and [place] as well as XR prior to discharge. OK to discontinue HEIDI drain. Need one more PVR.     I will call his guardian this am. Left voice mail to return call     1600 ADDENDUM: second call to guardian without response. Call placed to group home. Staff which answered said they have not been able to reach their supervisor to approve his return. If no response in the next 1-2 hours we will hopefully discharge tomorrow. PT has cleared patient without rec's for home PT. XR completed. Brace is in place. Patient on tizanidine pre-op which has been continued. He has not taken anything for pain except tylenol as of the time of this note.     HPI: 51 yo male with a history of bipolar 1 disorder, chemical dependence, gastritis, GERD, asthma, TIA who resides in a group home presents with 3 months of progressive cervical radiculopathy and myelopathy symptoms. He thinks his symptoms began sometime around the beginning of February.  Noted mostly pain in his neck into his right shoulder and arm  Seen in the ED on February 6, 2022 and was given a trigger point injection with improvement of his symptoms. Since that time he is progressively worsened. He now describes having increased pain and numbness in his bilateral arms as well as weakness particularly of  but notices arms and legs are weak as well with legs \"giving out\". Right  worse then left.  Also is dropping objects frequently. He is having difficulty ambulating " "upstairs. He also has imbalance with walking. More recently he has developed both urinary and bowel incontinence. Has progressed within the last weeks to few days. Hyperreflexic on exam pre-op with diminished sensation to touch bilateral LE, UE and torso. Imaging reveals large disc herniation C5-6 creating severe spinal canal stenosis, cord signal change. He also has stenosis at C3-4 and C4-5. Foraminal narrowing left greater than right at C4-5 and severe left and moderate right C3-4. Every day current smoker 2 packs per day.     Subjective: denies pain. Endorses improved sensation to touch to LE and abdomen which is improved since yesterday exam. Denies difficulty swallowing. Feels his gait is improved. Has improved bladder control. Feels his strength is better.     Physical Exam  BP (!) 133/90 (BP Location: Right arm, Patient Position: Supine, Cuff Size: Adult Regular)   Pulse 101   Temp 98.3  F (36.8  C) (Oral)   Resp 18   Ht 1.854 m (6' 1\")   Wt 83.5 kg (184 lb)   SpO2 96%   BMI 24.28 kg/m      General: alert, oriented. Seated in chair.     Motor: BANDA. Normal bulk and tone     Strength: (improved since pre-op)  biceps 5/5 right, 5/5 left   Triceps 5/5 right, 5/5 left   Deltoid 5/5 right, 5/5 left  Hand grasp 4+/5 right, 4+/5 left   Hip flexors 5/5 right, 5/5 left   Quadriceps: 5/5 right, 5/5 left   Ankle dorsiflexion: 5/5 right, 5/5 left   Extensor hallicus longus: 5/5 right, 5/5 left   Ankle plantar flexion : 5/5 right, 5/5 left     Sensation: intact to light touch to LE, UE (improved since pre-op)     Coordination: not observed     Incision: covered with surgical dressing; not removed     HEIDI: 25 ml out on evening with zero out on nights     Harvey: out and voiding with one recorded PVR which was normal     Imaging: needs XR     Yessica Giron, JOSEFINA-CNP  [facility] Neurosurgery  O: [phone]        "

## 2022-05-07 NOTE — PLAN OF CARE
Goal Outcome Evaluation:                Patient finished third and final bladder scan PVR. Bladder protocol complete.

## 2022-05-07 NOTE — DISCHARGE INSTRUCTIONS
DO NOT TAKE  NSAIDs  for 6 months after fusion surgery, as these medications may delay bone healing  NSAIDS include such drugs as:  Aspirin  Ibuprofen  Motrin  Advil  Aleve  Naproxen     This  also includes NSAIDS that you apply to the skin such as Diclofenac gel     You may resume your aspirin in another 4 days.     If you are unsure if a medication is a NSAID ask your pharmacist or call our office and discuss with a nurse    Smoking will also interfere with full bony fusion. Avoid smoking if possible.

## 2022-05-07 NOTE — PLAN OF CARE
Problem: Plan of Care - These are the overarching goals to be used throughout the patient stay.    Goal: Optimal Comfort and Wellbeing  Outcome: Ongoing, Progressing  Intervention: Monitor Pain and Promote Comfort  Recent Flowsheet Documentation  Taken 5/7/2022 0043 by Braden Pickering, RN  Pain Management Interventions: emotional support   Goal Outcome Evaluation:      Pain control with scheduled Tylenol.

## 2022-05-07 NOTE — ANESTHESIA POSTPROCEDURE EVALUATION
Patient: Ihsan Marcus    Procedure: Procedure(s):  Cervical 5 - Cervical 6 anterior cervical decompression and fusion with plate; Possible Cervical  6 corpectomy       Anesthesia Type:  General    Note:  Disposition: Inpatient   Postop Pain Control: Uneventful            Sign Out: Well controlled pain   PONV: No   Neuro/Psych: Uneventful            Sign Out: Acceptable/Baseline neuro status   Airway/Respiratory: Uneventful            Sign Out: Acceptable/Baseline resp. status   CV/Hemodynamics: Uneventful            Sign Out: Acceptable CV status; No obvious hypovolemia; No obvious fluid overload   Other NRE: NONE   DID A NON-ROUTINE EVENT OCCUR? No           Last vitals:  Vitals Value Taken Time   /78 05/06/22 1830   Temp 36.4  C (97.5  F) 05/06/22 1830   Pulse 64 05/06/22 1837   Resp 19 05/06/22 1837   SpO2 98 % 05/06/22 1837   Vitals shown include unvalidated device data.    Electronically Signed By: Don Ortiz MD  May 6, 2022  7:48 PM

## 2022-05-07 NOTE — PLAN OF CARE
Problem: Plan of Care - These are the overarching goals to be used throughout the patient stay.    Goal: Optimal Comfort and Wellbeing  Outcome: Ongoing, Progressing   Goal Outcome Evaluation:      Pt denied pain, SOB, numbness and tingling. Pt has been continent this shift with bladder scan roughly 30ml. Pt states he is passing gas but no bowel movement. Denied nausea, tolerating reg diet and eating. Drinking well. Ambulating with staff using cane. Pt fit for [facility] today. Incision dressing changed and HEIDI removed per order. Incision is clean, dry and intact with surgical glue. No s/s of infection assessed, VSS, afebrile. Possible discharge later today or tomorrow morning.   Problem: Plan of Care - These are the overarching goals to be used throughout the patient stay.    Goal: Optimal Comfort and Wellbeing  Outcome: Ongoing, Progressing  Goal: Readiness for Transition of Care  Outcome: Ongoing, Progressing     Problem: Surgical Site Infection  Goal: Absence of Infection Signs and Symptoms  Outcome: Ongoing, Progressing     Problem: Mobility Impairment  Goal: Optimal Mobility  Outcome: Ongoing, Progressing  Intervention: Optimize Mobility  Recent Flowsheet Documentation  Taken 5/7/2022 0752 by Yessica Laura RN  Assistive Device Utilized: gait belt  Activity Management:   activity adjusted per tolerance   activity encouraged     Problem: Plan of Care - These are the overarching goals to be used throughout the patient stay.    Goal: Absence of Hospital-Acquired Illness or Injury  Intervention: Identify and Manage Fall Risk  Recent Flowsheet Documentation  Taken 5/7/2022 0752 by Yessica Laura RN  Safety Promotion/Fall Prevention:   activity supervised   assistive device/personal items within reach   patient and family education   nonskid shoes/slippers when out of bed   room door open   room near nurse's station  Intervention: Prevent Skin Injury  Recent Flowsheet Documentation  Taken 5/7/2022 0752 by Valentín  Yessica MORENO, RN  Body Position: position changed independently  Intervention: Prevent and Manage VTE (Venous Thromboembolism) Risk  Recent Flowsheet Documentation  Taken 5/7/2022 5564 by Yessica Laura, RN  Activity Management:   activity adjusted per tolerance   activity encouraged

## 2022-05-07 NOTE — DISCHARGE SUMMARY
[facility]  Discharge Summary - Medicine & Pediatrics       Date of Admission:  5/5/2022  Date of Discharge:  5/7/2022  Discharging Provider: Dick Jolly MD.  Discharge Service: [facility].    Discharge Diagnoses   Cervical spondylosis with myelopathy status post successful C5-6 anterior cervical discectomy with fusion with excellent results.    Follow-ups Needed After Discharge   Follow-up Appointments     Follow-up and recommended labs and tests       Neurosurgery will call you to set up your follow up. Please call [phone] with questions or if you do not hear from us.         Follow-up and recommended labs and tests       Follow up with primary care provider, Mayda Albarran, within 7 days for   hospital follow- up.  No follow up labs or test are needed.         Assist with smoking cessation.    Unresulted Labs Ordered in the Past 30 Days of this Admission     Date and Time Order Name Status Description    5/5/2022  8:15 PM Prepare pheresed platelets (unit) Preliminary     5/5/2022  8:15 PM Prepare pheresed platelets (unit) Preliminary       These results will be followed up by PCP.    Discharge Disposition   Discharged to home  Condition at discharge: Stable    Hospital Course   Ihsan Marcus is a 52 year old male presenting with neck pain, numbness of extremities and incontinence found to have cervical spondylosis with myelopathy, admitted 5/5/2022 from neurosurgery clinic for urgent C5-6 anterior cervical discectomy and fusion performed 5/6/2022 with excellent results.     Cervical spondylosis with myelopathy   Patient presents with three months of neck pain and two weeks of progressively worsening bilateral arm weakness and pain, and bilateral leg weakness and decreasing sensation. Also with two days of bladder incontinence. MRI performed on 4/20 showed large disc extrusion at C5-C6 contributes to severe spinal canal stenosis with cord compression and  cord signal abnormality. Patient was seen at neurosurgery clinic 5/5 and was sent to [facility] for urgent anterior cervical discectomy and fusion on 5/6/22 due to concern for cord compression.  Procedure completed with excellent results and patient actually improved so quickly that he was stable for discharge 5/7/2022 per neurology/neurosurgery.  -Neurosurgery consulted and performed surgery, applied cervical collar after procedure and patient will have to continue using this.  -Resume ASA several days after discharge per neurology.  -Pain control after discharge prescribed by neurosurgery.  -Activity restrictions given by neurosurgery.  -Follow-up with PCP within 7 days and with neurology/neurosurgery per their team's recommendations.     History of TIA  Patient had a history of a TIA in 2018, at which time he was placed on aspirin and a statin.   -Normal brain/head/neck MRI/MRA 5/7.  Stable to discharge per neurology.     Current smoker  Patient reports currently smoking two packs a day. He notes that his smoking habits have increased over the past few months in response to his pain, and he knows this is not good for him. He started smoking at age [age].  - Nicotine gum, nicotine patch 21 mg both prescribed on discharge.     Hx Bipolar:  - Continue mental health regimen.    Consultations This Hospital Stay   NEUROSURGERY IP CONSULT  CARE MANAGEMENT / SOCIAL WORK IP CONSULT  NEUROLOGY IP CONSULT  ORTHOSIS CERVICAL COLLAR IP CONSULT  PHYSICAL THERAPY ADULT IP CONSULT  OCCUPATIONAL THERAPY ADULT IP CONSULT  SOCIAL WORK IP CONSULT  ORTHOSIS BRACE IP CONSULT    Code Status   Full Code       The patient was discussed with Dr. Eduard Thao.    Alex Jolly MD  [facility], PGY-3  ______________________________________________________________________    Physical Exam   Vital Signs: Temp: 98.2  F (36.8  C) Temp src: Oral BP: (!) 150/71 Pulse: 93   Resp: 18 SpO2: 95 % O2 Device:  None (Room air)    Weight: 184 lbs 0 oz    General:  Appears well and in no acute distress  HEENT:  Eyes grossly normal to inspection. Extraocular movements intact. Pupils equal, round, and reactive to light.  Cardiovascular:  Regular rate and rhythm  Respiratory:  Lungs clear to auscultation bilaterally.  Musculoskeletal:  No gross extremity deformities. No peripheral edema.  Derm:  No suspicious lesions or rashes     Neuro:  Mental status: Awake, alert, attentive, oriented to self, time, place, and circumstance. Language is fluent and coherent with intact comprehension of complex commands, naming and repetition.  Cranial nerves: Grossly intact.  Motor: Normal strength, bulk and tone. No abnormal movements.  Sensory:  Normal sensation globally to light touch.   Coordination: Grossly well-coordinated.      Primary Care Physician   Mayda Albarran    Discharge Orders      Reason for your hospital stay    Spine surgery     Follow-up and recommended labs and tests     Neurosurgery will call you to set up your follow up. Please call  with questions or if you do not hear from us.     Activity    Your activity upon discharge:  *No lifting, pushing or pulling greater than 5-10 pound (this is about a gallon of milk).  *No repetitive bending, twisting, or jarring activities  *No overhead work  *No aerobic or strenuous activity  *No activities with increased risk of falls  *You may move about your home as tolerated  *You may walk up and down stairs as tolerated  *You may increase your activity slowly over the next 4-6 weeks    WALKING PROGRAM: As you can tolerate, walk daily-start with 5-10 minutes of continuous walking. This is in addition to the walking that you do as part of your daily activities. Increase the time that you walk by 5 minutes every couple of days. Do not exceed 30-45 minutes of continuous walking until seen in follow-up. Walking is the best exercise after surgery.  **Listen to your body, if you  find that you are more painful or fatigued, you may need to proceed more slowly.    **Do not smoke or expose yourself to second hand smoke. Cigarette smoke can delay healing and cause complications.     DRIVING:  We recommend that you do not drive while taking medications for pain or muscle spasms. Always read and follow the advice on your prescription bottle. If you have questions, speak with your pharmacist.  We recommend that you do not drive while wearing a brace, as it could limit your range of motion.    WORK: If you plan to return to work before you 4-6 weeks appointment, call and discuss with one of the nurses in the neurosurgery office.     USE OF ICE OR  HEAT:  *Icing can decrease post op swelling, thereby helping with post op pain control. Always protect your skin to prevent frostbite or burn.    *For the first 48 hours we recommend ice to the surgical area for 15-20 minutes at a time several times a day.      *Any longer than 15-20 minutes can cause damage to the tissues, including frostbite.     *Allow area to warm for at least 45 minutes or an hour between treatments.    *Ice as frequently as you wish, so long as the area is warm to touch and has normal sensation before repeating.    *After 48 hours, you may use heat or ice, which ever feels best to you.  Always remember to protect your skin, and to use no greater than 15-20 minutes at a time.     *You can make your own ice bags at home by mixing 3 parts water with 1 part rubbing alcohol in a Ziplock bag and placing in the freezer.  It will not freeze solid.      BOWEL MOVEMENT:  If you did not have a bowel movement (pooped) before you were discharged from the hospital, and do not have a bowel movement within 2 days of discharge. Please call our office and request to speak to a nurse.    Your insurance may not cover medications to treat or prevent  constipation.      There are many  over the counter medications for constipation including: Colace,  Senakot, Dulcolax, and Miralax. In addition to medications eat a high fiber diet and drink plenty of water.    If you are taking narcotics, you should being taking medication daily for constipation.      If you develop loose or runny stools, stop taking the medications for constipation.      WEARING THE COLLAR:   Nelson J: (collar with blue pads) wear at all times. May remove for meals once seated in chair.    * The patient should have two sets of blue pads for the collar   Wash and exchange the pads at least daily. Wash the pads in mild soap and lay  flat to dry.   *The collar should fit snuggly.   *Clean and check the skin under the collar at least daily.              *Can remove twice daily for skin care 10 minutes once seated in a chair     Belkis: If the patient has a Belkis collar (pink foam collar) use for showers.   ** If the patient does not have a Belkis collar, shower in the Nelson J wipe dry and replace pads with the patient sitting.    *The patient should sit to shower to limit the risk of falling.     When to contact your care team    Call [phone] with the following symptoms:    *Temperature 101(fever) or greater or chills  *Redness, swelling or increased drainage from the wound  *Worsening pain not relieved by the pain prescription given  *Worsening or new onset of weakness, or numbness and tingling  *Loss or change in your ability to control bowel or bladder function  *Change in your ability to walk, talk, see or think  *If you did not have a bowel movement before leaving the hospital and your bowels have not moved within 48 hours of your discharge    !!!! IF YOU HAVE A SERIOUS OR THREATENING EMERGENCY, CALL 911 OR COME TO THE EMERGENCY ROOM.  IF YOUR CONDITIONS ALLOWS COME TO THE HOSPITAL WHERE YOUR SURGERY WAS PERFORMED.    QUESTIONS OR CONCERNS:   OUR OFFICE HOURS ARE FROM 9:00 A.M -4:30 P.M. MONDAY-FRIDAY.  AFTER OFFICE HOURS, CALLS ARE ANSWERED BY THE ON-CALL PROVIDER. PLEASE CALL WITH  ROUTINE QUESTIONS DURING OFFICE HOURS. THE ON-CALL PROVIDER WILL NOT REFILL PRESCRIPTIONS.     Wound care and dressings    Instructions to care for your wound at home:   WOUND CARE:  A dressing is not required.      Wash your hands before touching the wound.  Ensure that anyone assisting you in the care of your wound washes her/his hands before touching the wound. Good handwashing can decrease the risk of serious infection.    If you are unable to see your wound, have someone check the wound daily for redness, swelling,or drainage.   You may shower. Wash your wound daily.  Apply mild soap directly to the wound, form a light lather and rinse with running water. Pat the wound dry.  Do not rub, pick, or otherwise help the dermabond come off more quickly.  Do not place tape or adhesive over the dermabond.    Do not submerge the wound under water. No baths or swimming for 6 weeks.     If you develop redness, swelling, drainage, or temp 101 or greater, call our office at [phone]    You may have an appointment in 7-10 days to have your wound checked.     Reason for your hospital stay    Cervical compression now status post decompression surgery with good result.     Follow-up and recommended labs and tests     Follow up with primary care provider, Mayda Albarran, within 7 days for hospital follow- up.  No follow up labs or test are needed.     Discharge patient     Cane Order for DME - ONLY FOR DME    I, the undersigned, certify that the above prescribed supplies are medically necessary for this patient and is both reasonable and necessary in reference to accepted standards of medical and necessary in reference to accepted standards of medical practice in the treatment of this patient's condition and is not prescribed as a convenience.      Diet    Follow this diet upon discharge: Resume diet as prior to hospital stay       Significant Results and Procedures   Results for orders placed or performed during the  hospital encounter of 05/05/22   XR Surgery PANCHITO Fluoro L/T 5 Min    Narrative    This exam was marked as non-reportable because it will not be read by a   radiologist or a [facility] non-radiologist provider.         MR Brain w/o & w Contrast    Narrative    EXAM: MR BRAIN WITHOUT AND WITH CONTRAST, MRA NECK (CAROTIDS) WITHOUT AND WITH CONTRAST, MRA BRAIN ([place]) WITHOUT CONTRAST  LOCATION: [facility]  DATE/TIME: 05/07/2022, 12:36 PM    INDICATION: Stroke, follow-up.  COMPARISON: None.  CONTRAST: 10 mL Gadavist.  TECHNIQUE:   1) Routine multiplanar multisequence head MRI without and with intravenous contrast.  2) 3D time-of-flight head MRA without intravenous contrast.  3) Neck MRA without and with IV contrast. Stenosis measurements made according to NASCET criteria unless otherwise specified.    FINDINGS:  HEAD MRI: Image quality is mildly degraded by motion.    INTRACRANIAL CONTENTS: No acute or subacute infarct. No mass, acute hemorrhage, or extra-axial fluid collections. Normal brain parenchymal signal. Normal ventricles and sulci. Normal position of the cerebellar tonsils. No pathologic contrast enhancement.    SELLA: No abnormality accounting for technique.    OSSEOUS STRUCTURES/SOFT TISSUES: Normal marrow signal. The major intracranial vascular flow-voids are maintained.     ORBITS: No abnormality accounting for technique.     SINUSES/MASTOIDS: Small retention cyst right maxillary sinus. Paranasal sinuses are otherwise clear. No middle ear or mastoid effusion.     HEAD MRA:   ANTERIOR CIRCULATION: No stenosis/occlusion, aneurysm, or high flow vascular malformation. 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    POSTERIOR CIRCULATION: No stenosis/occlusion, aneurysm, or high flow vascular malformation. Balanced vertebral arteries supply a normal basilar artery.     NECK MRA:   RIGHT CAROTID: No measurable stenosis or dissection.    LEFT CAROTID: No  measurable stenosis or dissection.    VERTEBRAL ARTERIES: No focal stenosis or dissection. Balanced vertebral arteries.    AORTIC ARCH: Classic aortic arch anatomy with no significant stenosis at the origin of the great vessels.      Impression    IMPRESSION:  HEAD MRI:   1.  Normal head MRI.    HEAD MRA:   1.  Normal MRA [place].    NECK MRA:  1.  Normal neck MRA.     MRA Brain ([place]) wo Contrast    Narrative    EXAM: MR BRAIN WITHOUT AND WITH CONTRAST, MRA NECK (CAROTIDS) WITHOUT AND WITH CONTRAST, MRA BRAIN ([place]) WITHOUT CONTRAST  LOCATION: [facility]  DATE/TIME: 05/07/2022, 12:36 PM    INDICATION: Stroke, follow-up.  COMPARISON: None.  CONTRAST: 10 mL Gadavist.  TECHNIQUE:   1) Routine multiplanar multisequence head MRI without and with intravenous contrast.  2) 3D time-of-flight head MRA without intravenous contrast.  3) Neck MRA without and with IV contrast. Stenosis measurements made according to NASCET criteria unless otherwise specified.    FINDINGS:  HEAD MRI: Image quality is mildly degraded by motion.    INTRACRANIAL CONTENTS: No acute or subacute infarct. No mass, acute hemorrhage, or extra-axial fluid collections. Normal brain parenchymal signal. Normal ventricles and sulci. Normal position of the cerebellar tonsils. No pathologic contrast enhancement.    SELLA: No abnormality accounting for technique.    OSSEOUS STRUCTURES/SOFT TISSUES: Normal marrow signal. The major intracranial vascular flow-voids are maintained.     ORBITS: No abnormality accounting for technique.     SINUSES/MASTOIDS: Small retention cyst right maxillary sinus. Paranasal sinuses are otherwise clear. No middle ear or mastoid effusion.     HEAD MRA:   ANTERIOR CIRCULATION: No stenosis/occlusion, aneurysm, or high flow vascular malformation. 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    POSTERIOR CIRCULATION: No stenosis/occlusion, aneurysm, or  high flow vascular malformation. Balanced vertebral arteries supply a normal basilar artery.     NECK MRA:   RIGHT CAROTID: No measurable stenosis or dissection.    LEFT CAROTID: No measurable stenosis or dissection.    VERTEBRAL ARTERIES: No focal stenosis or dissection. Balanced vertebral arteries.    AORTIC ARCH: Classic aortic arch anatomy with no significant stenosis at the origin of the great vessels.      Impression    IMPRESSION:  HEAD MRI:   1.  Normal head MRI.    HEAD MRA:   1.  Normal MRA [place].    NECK MRA:  1.  Normal neck MRA.     MRA Neck (Carotids) wo & w Contrast    Narrative    EXAM: MR BRAIN WITHOUT AND WITH CONTRAST, MRA NECK (CAROTIDS) WITHOUT AND WITH CONTRAST, MRA BRAIN ([place]) WITHOUT CONTRAST  LOCATION: [facility]  DATE/TIME: 05/07/2022, 12:36 PM    INDICATION: Stroke, follow-up.  COMPARISON: None.  CONTRAST: 10 mL Gadavist.  TECHNIQUE:   1) Routine multiplanar multisequence head MRI without and with intravenous contrast.  2) 3D time-of-flight head MRA without intravenous contrast.  3) Neck MRA without and with IV contrast. Stenosis measurements made according to NASCET criteria unless otherwise specified.    FINDINGS:  HEAD MRI: Image quality is mildly degraded by motion.    INTRACRANIAL CONTENTS: No acute or subacute infarct. No mass, acute hemorrhage, or extra-axial fluid collections. Normal brain parenchymal signal. Normal ventricles and sulci. Normal position of the cerebellar tonsils. No pathologic contrast enhancement.    SELLA: No abnormality accounting for technique.    OSSEOUS STRUCTURES/SOFT TISSUES: Normal marrow signal. The major intracranial vascular flow-voids are maintained.     ORBITS: No abnormality accounting for technique.     SINUSES/MASTOIDS: Small retention cyst right maxillary sinus. Paranasal sinuses are otherwise clear. No middle ear or mastoid effusion.     HEAD MRA:   ANTERIOR CIRCULATION: No stenosis/occlusion,  aneurysm, or high flow vascular malformation. 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    POSTERIOR CIRCULATION: No stenosis/occlusion, aneurysm, or high flow vascular malformation. Balanced vertebral arteries supply a normal basilar artery.     NECK MRA:   RIGHT CAROTID: No measurable stenosis or dissection.    LEFT CAROTID: No measurable stenosis or dissection.    VERTEBRAL ARTERIES: No focal stenosis or dissection. Balanced vertebral arteries.    AORTIC ARCH: Classic aortic arch anatomy with no significant stenosis at the origin of the great vessels.      Impression    IMPRESSION:  HEAD MRI:   1.  Normal head MRI.    HEAD MRA:   1.  Normal MRA [place].    NECK MRA:  1.  Normal neck MRA.     XR Cervical Spine 2/3 Views    Narrative    EXAM: XR CERVICAL SPINE 2/3 VWS  LOCATION: [facility]  DATE/TIME: 5/7/2022 3:12 PM    INDICATION: Neck pain status post cervical fusion  COMPARISON: Cervical spine x-ray dated 05/05/2022.  TECHNIQUE: CR Cervical Spine.      Impression    IMPRESSION: C5-C6 ACDF without complication Reversal of usual cervical lordosis without significant spondylolisthesis. No acute fracture. Scattered multilevel degenerative change most prominent at C3-C4 where there is mild to moderate disc height loss             Discharge Medications   Current Discharge Medication List      START taking these medications    Details   acetaminophen (TYLENOL) 325 MG tablet Take 3 tablets (975 mg) by mouth every 8 hours    Associated Diagnoses: Cervical spondylosis with myelopathy      multivitamin, therapeutic (THERA-VIT) TABS tablet Take 1 tablet by mouth daily  Qty: 30 tablet, Refills: 0    Associated Diagnoses: Anorexia      nicotine (NICODERM CQ) 21 MG/24HR 24 hr patch Place 1 patch onto the skin daily  Qty: 30 patch, Refills: 0    Associated Diagnoses: Tobacco abuse      nicotine polacrilex (NICORETTE) 4 MG gum Place 1 each (4 mg) inside cheek every  hour as needed for other (nicotine withdrawal symptoms)  Qty: 100 each, Refills: 0    Associated Diagnoses: Tobacco abuse      oxyCODONE (ROXICODONE) 5 MG tablet Take 1 tablet (5 mg) by mouth every 4 hours as needed for moderate to severe pain  Qty: 20 tablet, Refills: 0    Associated Diagnoses: Cervical spondylosis with myelopathy         CONTINUE these medications which have CHANGED    Details   aspirin (ASA) 81 MG chewable tablet Take 1 tablet (81 mg) by mouth daily RESUME 5/10/2022  Qty: 90 tablet, Refills: 3    Associated Diagnoses: Transient ischemic attack         CONTINUE these medications which have NOT CHANGED    Details   calcium polycarbophil (FIBERCON) 625 MG tablet Take 2 tablets (1,250 mg) by mouth daily  Qty: 60 tablet, Refills: 11    Associated Diagnoses: Constipation, unspecified constipation type      cetirizine (ZYRTEC) 10 MG tablet Take 1 tablet (10 mg) by mouth daily  Qty: 30 tablet, Refills: 11    Associated Diagnoses: Seasonal allergic rhinitis due to pollen      famotidine (PEPCID) 20 MG tablet Take 1 tablet (20 mg) by mouth 2 times daily as needed (for symptoms of reflux)  Qty: 180 tablet, Refills: 3    Associated Diagnoses: Gastroesophageal reflux disease without esophagitis      gabapentin (NEURONTIN) 100 MG capsule Take 1 capsule (100 mg) by mouth 3 times daily as needed for neuropathic pain  Qty: 90 capsule, Refills: 0    Associated Diagnoses: Neck pain; Cervical radiculopathy      guaiFENesin (ROBITUSSIN) 100 MG/5ML liquid Take 10 mLs (200 mg) by mouth every 4 hours as needed for cough  Qty: 473 mL, Refills: 3    Associated Diagnoses: Cough      ketoconazole (NIZORAL) 2 % external shampoo Apply topically daily as needed for itching or irritation  Qty: 100 mL, Refills: 1    Associated Diagnoses: Tinea versicolor      melatonin 3 MG tablet Take 6 mg by mouth nightly as needed       Menthol-Camphor (ICY HOT ADVANCED PAIN RELIEF) 16-11 % CREA Externally apply 2 g topically 5 x daily PRN  (for pain on shoulder for muscle spasm and joint pain)  Qty: 56 g, Refills: 0    Associated Diagnoses: Spasmodic torticollis      OLANZapine (ZYPREXA) 15 MG tablet Take 15 mg by mouth At Bedtime      rosuvastatin (CRESTOR) 10 MG tablet TAKE ONE TABLET BY MOUTH EVERY DAY  Qty: 90 tablet, Refills: 3    Associated Diagnoses: Mixed hyperlipidemia      salicylic acid (COMPOUND W MAX STRENGTH) 17 % external gel Apply topically daily TO LESIONS ON FEET UNTIL CLEAR.  Qty: 7 g, Refills: 1    Associated Diagnoses: Callus of foot      senna-docusate (SENOKOT-S/PERICOLACE) 8.6-50 MG tablet Take 1 tablet by mouth daily as needed for constipation  Qty: 90 tablet, Refills: 2    Associated Diagnoses: Slow transit constipation      tiZANidine (ZANAFLEX) 4 MG capsule Take 1 capsule (4 mg) by mouth 3 times daily As needed for muscle spasm  Qty: 20 capsule, Refills: 0    Associated Diagnoses: Spasmodic torticollis         STOP taking these medications       diclofenac (VOLTAREN) 1 % topical gel Comments:   Reason for Stopping:         ibuprofen (ADVIL/MOTRIN) 600 MG tablet Comments:   Reason for Stopping:             Allergies   Allergies   Allergen Reactions     Fish Swelling and Other (See Comments)     Facial swelling and sleepiness     Latex      Other reaction(s): Other (see comments)     Mushroom Hives     Olive Oil Unknown and Other (See Comments)     Peanut (Diagnostic)      Other reaction(s): Edema     Penicillins      Other reaction(s): Other (see comments)     Trazodone      Other reaction(s): Other (see comments)

## 2022-05-07 NOTE — PROGRESS NOTES
[facility]    Medicine Progress Note - Hospitalist Service    Date of Admission:  5/5/2022    Assessment & Plan        Ihsan Marcus is a 52 year old male presenting with neck pain, numbness of extremities and incontinence found to have cervical spondylosis with myelopathy, admitted from neurosurgery clinic for urgent C5-6 anterior cervical discectomy and fusion performed 5/6/2022.      Cervical spondylosis with myelopathy   Patient presents with three months of neck pain and two weeks of progressively worsening bilateral arm weakness and pain, and bilateral leg weakness and decreasing sensation. Also with two days of bladder incontinence. MRI performed on 4/20 showed large disc extrusion at C5-C6 contributes to severe spinal canal stenosis with cord compression and cord signal abnormality. Patient was seen at neurosurgery clinic 5/5 and was sent to [facility] for urgent anterior cervical discectomy and fusion on 5/6/22 due to concern for cord compression.  Procedure completed with excellent results and patient actually stable for discharge 5/7/2022 per neurology/neurosurgery if we could get him back to the group home, but unfortunately have been unable to reach the correct staff members to make this happen.  -Neurosurgery consulted.  -We will resume ASA when appropriate from a surgical standpoint.  -Pain control per neurosurgery.  -Per neurology, could discharge today if group home can take him back.    History of TIA  Patient had a history of a TIA in 2018, at which time he was placed on aspirin and a statin.   -Normal brain/head/neck MRI/MRA 5/7.  Stable to discharge per neurology.     Current smoker  Patient reports currently smoking two packs a day. He notes that his smoking habits have increased over the past few months in response to his pain, and he knows this is not good for him. He started smoking at age [age].  - Nicotine gum  - Nicotine patch, 21    Hx Bipolar:  - Continue PTA  Zyprexa      Diet: NPO per Anesthesia Guidelines for Procedure/Surgery Except for: Meds    DVT Prophylaxis: Pneumatic Compression Devices  Harvey Catheter: Not present  Fluids: None   Central Lines: None  Cardiac Monitoring: None  Code Status:  Full code    Disposition Plan   Expected Discharge: 05/08/2022 unknown at this time  Anticipated discharge location:  Awaiting care coordination huddle       The patient's care was discussed with Dr. Eduard Thao.    Felton Jolly MD  Hospitalist Service  [facility]  Securely message with the Vocera Web Console (learn more here)  Text page via [facility] Paging/Directory       Clinically Significant Risk Factors Present on Admission   # Platelet Defect: home medication list includes an antiplatelet medication.            ______________________________________________________________________    Interval History   Patient feels good after surgery and states his symptoms have all been reversed.  Eating and drinking, passing flatus and urinating.  Would like to go home if appropriate.    Data reviewed today: I reviewed all medications, new labs and imaging results over the last 24 hours.     Physical Exam   Vital Signs: Temp: 98.2  F (36.8  C) Temp src: Oral BP: (!) 150/71 Pulse: 93   Resp: 18 SpO2: 95 % O2 Device: None (Room air) Oxygen Delivery: 5 LPM  Weight: 184 lbs 0 oz  General:  Appears well and in no acute distress  HEENT:  Eyes grossly normal to inspection. Extraocular movements intact. Pupils equal, round, and reactive to light.  Cardiovascular:  Regular rate and rhythm  Respiratory:  Lungs clear to auscultation bilaterally.  Musculoskeletal:  No gross extremity deformities. No peripheral edema.  Derm:  No suspicious lesions or rashes     Neuro:  Mental status: Awake, alert, attentive, oriented to self, time, place, and circumstance. Language is fluent and coherent with intact comprehension of complex commands, naming and  repetition.  Cranial nerves: Grossly intact.  Motor: Normal strength, bulk and tone. No abnormal movements.  Sensory:  Normal sensation globally to light touch.   Coordination: Grossly well-coordinated.    Data

## 2022-05-07 NOTE — PLAN OF CARE
Problem: Mobility Impairment  Goal: Optimal Mobility  Outcome: Adequate for Care Transition     Problem: Surgical Site Infection  Goal: Absence of Infection Signs and Symptoms  Outcome: Adequate for Care Transition     Problem: Pain Acute  Goal: Acceptable Pain Control and Functional Ability  Outcome: Adequate for Care Transition       Patient discharge under orders form CMSW and hospitalist. Patient enthusiastic regarding discharge. Patient helped downstairs in wheelchair and assisted into  care workers van. Care worker signed paperwork before driving patient back to group home.

## 2022-05-07 NOTE — PROGRESS NOTES
05/07/22 0845   Quick Adds   Type of Visit Initial Occupational Therapy Evaluation   Living Environment   People in Home other (see comments)  (Group home staff)   Current Living Arrangements residential facility  (group home)   Home Accessibility no concerns;wheelchair accessible   Living Environment Comments Pt. bedroom on main level, tub/shower w/ GB   Self-Care   Usual Activity Tolerance good   Current Activity Tolerance fair   Regular Exercise Yes   Activity/Exercise Type other (see comments)  (Outpatient PT)   Equipment Currently Used at Home grab bar, toilet;grab bar, tub/shower;raised toilet seat   Fall history within last six months yes   Number of times patient has fallen within last six months 1   Activity/Exercise/Self-Care Comment Ind. all basic self cares, mobility.  Assist w/ meal prep, meds, cleaning and laundry.   Uses [employer] mobility.   Instrumental Activities of Daily Living (IADL)   IADL Comments works as an / work   General Information   Onset of Illness/Injury or Date of Surgery 05/05/22   Referring Physician Ruthann Law PA-C   Patient/Family Therapy Goal Statement (OT) To get healed up   Existing Precautions/Restrictions fall;spinal;brace worn when out of bed  (miami-J collar)   General Observations and Info 51 yo male with a history of bipolar 1 disorder, chemical dependence, gastritis, GERD, asthma, TIA who resides in a group home presents with 3 months of progressive cervical radiculopathy and myelopathy symptoms.  S/p C5-6 cercial fusion   Cognitive Status Examination   Orientation Status orientation to person, place and time   Visual Perception   Visual Impairment/Limitations corrective lenses full-time   Sensory   Sensory Comments Pt. reports numbness/tingling in UE's has resolved since surgery.   Pain Assessment   Patient Currently in Pain No   Integumentary/Edema   Integumentary/Edema no deficits were identifed   Posture   Posture not impaired    Range of Motion Comprehensive   General Range of Motion no range of motion deficits identified   Strength Comprehensive (MMT)   General Manual Muscle Testing (MMT) Assessment no strength deficits identified   Coordination   Upper Extremity Coordination No deficits were identified   Bed Mobility   Bed Mobility sit-supine   Sit-Supine [place] (Bed Mobility) contact guard   Comment (Bed Mobility) log roll technique   Transfers   Transfer Comments Pt. up in chair without Lu[place] J collar.  Min. assist chair>bed transf without device to return to supine until Lupton brace arrives.  Will further address mobility once Lupton J arrives.   Balance   Balance Comments CGA static and dynamic balance unsupported.   Activities of Daily Living   BADL Assessment/Intervention lower body dressing   Lower Body Dressing Assessment/Training   Comment, (Lower Body Dressing) Don pants   [place] Level (Lower Body Dressing) contact guard assist   Clinical Impression   Criteria for Skilled Therapeutic Interventions Met (OT) Yes, treatment indicated   OT Diagnosis impaired self cares and functional mobility.   OT Problem List-Impairments impacting ADL activity tolerance impaired;balance;flexibility;mobility   Assessment of Occupational Performance 3-5 Performance Deficits   Identified Performance Deficits dressing, toileting, transfers, bathing, g/h standing   Planned Therapy Interventions (OT) ADL retraining;bed mobility training;transfer training;home program guidelines   Clinical Decision Making Complexity (OT) moderate complexity   Risk & Benefits of therapy have been explained evaluation/treatment results reviewed;care plan/treatment goals reviewed;participants voiced agreement with care plan;participants included;patient   OT Discharge Planning   OT Discharge Recommendation (DC Rec) home with assist   OT Rationale for DC Rec Anticipate pt. will be able to return home with group home staff assisting as needed pending pt.'s  progression with mobility.   Total Evaluation Time (Minutes)   Total Evaluation Time (Minutes) 10   OT Goals   Therapy Frequency (OT) 2 times/day   OT Predicted Duration/Target Date for Goal Attainment 05/13/22   OT Goals Lower Body Dressing;Hygiene/Grooming;Transfers;Toilet Transfer/Toileting   OT: Hygiene/Grooming modified independent;while standing   OT: Lower Body Dressing Modified independent   OT: Transfer Modified independent   OT: Toilet Transfer/Toileting Modified independent

## 2022-05-07 NOTE — PROGRESS NOTES
05/07/22 1300   Quick Adds   Type of Visit Initial PT Evaluation   Living Environment   People in Home other (see comments)  (Group home)   Current Living Arrangements residential facility  (group home)   Home Accessibility no concerns   Living Environment Comments Pt lives on the main level of his group home.   Self-Care   Usual Activity Tolerance good   Current Activity Tolerance fair   Regular Exercise Yes   Activity/Exercise Type other (see comments)  (Outpatient PT)   Equipment Currently Used at Home grab bar, toilet;grab bar, tub/shower;raised toilet seat   Fall history within last six months yes   Number of times patient has fallen within last six months 1   Activity/Exercise/Self-Care Comment Ind. all basic self cares, mobility.  Assist w/ meal prep, meds, cleaning and laundry.   Uses [employer] mobility. Was working as construction/, has been on light duty at a desk.   General Information   Onset of Illness/Injury or Date of Surgery 05/05/22   Referring Physician Dr. Eduard Thao.   Patient/Family Therapy Goals Statement (PT) To go home.   Pertinent History of Current Problem (include personal factors and/or comorbidities that impact the POC) From chart: Ihsan Marcus is POD #1 C5-6 ACDF with Dr Gage for cervical spondylosis with myelopathy, spinal stenosis, cord compression.   Existing Precautions/Restrictions brace worn when out of bed;fall;spinal   Weight-Bearing Status - LUE partial weight-bearing (% in comments)  (10 lbs)   Weight-Bearing Status - RUE partial weight-bearing (% in comments)  (10 lbs)   Weight-Bearing Status - LLE weight-bearing as tolerated   Weight-Bearing Status - RLE weight-bearing as tolerated   Heart Disease Risk Factors Gender;Race   General Observations Male sitting in chair.   Cognition   Affect/Mental Status (Cognition) WFL   Orientation Status (Cognition) oriented x 4   Follows Commands (Cognition) WFL   Cognitive Status Comments Pleasant, cooperative.   Pain  Assessment   Patient Currently in Pain No   Integumentary/Edema   Integumentary/Edema Comments Not formally assessed.   Posture    Posture Not impaired   Range of Motion (ROM)   ROM Comment Grossly WFL.   Strength (Manual Muscle Testing)   Strength Comments Grossly WFL.   Bed Mobility   Comment, (Bed Mobility) Not observed, per OT, log roll with CGA.   Transfers   Comment, (Transfers) Sit-stand with SBA.   Gait/Stairs (Locomotion)   Comment, (Gait/Stairs) Ambulated x 300 ft with no AD, CGA. Slightly unsteady. 200 ft with cane-improved. Up and down 4 steps with L rail, CGA, twice. Safe and steady.   Balance   Balance Comments Adequate for ambulation with cane.   Sensory Examination   Sensory Perception Comments Some intermittent numbness/tingling in various limbs.   Coordination   Coordination no deficits were identified   Muscle Tone   Muscle Tone no deficits were identified   Clinical Impression   Criteria for Skilled Therapeutic Intervention Yes, treatment indicated   PT Diagnosis (PT) Impaired functional mobility   Influenced by the following impairments S/p surgery, history of myelopathy.   Functional limitations due to impairments Ambulation, falls risk.   Clinical Presentation (PT Evaluation Complexity) Stable/Uncomplicated   Clinical Presentation Rationale Clinician judgment.   Clinical Decision Making (Complexity) low complexity   Planned Therapy Interventions (PT) bed mobility training;gait training;patient/family education;stair training;strengthening;transfer training   Anticipated Equipment Needs at Discharge (PT) cane, straight   Risk & Benefits of therapy have been explained patient   PT Discharge Planning   PT Discharge Recommendation (DC Rec) home with assist   PT Rationale for DC Rec Pt mobilizing well, anticipate safe d/c to group home with assist from staff as needed. Would eventually benefit from resuming OP PT when cleared by surgeon.   PT Brief overview of current status PT eval completed. Pt  mobilizing sufficiently well for discharge. More steady with cane, will issue.   Plan of Care Review   Plan of Care Reviewed With patient   Physical Therapy Goals   PT Frequency One time eval and treatment only   PT Predicted Duration/Target Date for Goal Attainment 05/07/22   PT Goals Bed Mobility;Transfers;Gait;Stairs   PT: Bed Mobility Independent;Supine to/from sit;Within precautions;Goal Met   PT: Transfers Modified independent;Sit to/from stand;Assistive device;Within precautions;Goal Met   PT: Gait Modified independent;Assistive device;Greater than 200 feet;Goal Met   PT: Stairs Modified independent;Within precautions;4 stairs;Goal Met

## 2022-05-07 NOTE — PROGRESS NOTES
S: Patient was seen today at   for an eval for a cervical collars as ordered by Dr.Rosenstein    O/G: The objective/goal of the collar is to help reduce motion/give cervical stability.    A: Pt was fit with a [place] J cervical collar, size 300 and a Belkis collar.  Starting with the anterior section, the correct size and height was chosen that supported the patient in the desired position.  Attention was given to the chin support being sure that the correct height was selected to support the chin.   The posterior section was centered on the patients head .  With the side closure straps extending equally on both sides, the Velcro was secured.  An extra padding set was also provided.  Patient instructed on donning, doffing, use and care    P: Patient has been instructed to contact our facility with any questions and/or concerns.  MEDHAT Tsang.

## 2022-05-07 NOTE — CONSULTS
NEUROLOGY CONSULTATION NOTE     Ihsan Marcus,  1970, MRN [id number] Date: 2022     [facility]   Code status:  Full Code   PCP: Mayda Albarran, [phone]      ASSESSMENT & PLAN   Diagnosis code: History of TIA.  Facial droop.    52-year-old man who is postop day 1 for cervical decompression, completed for progressive myelopathy.  History of right-sided facial droop of unclear duration.    Recommend brain MRI/MRA.  If there are no acute findings I do not think any additional work-up is needed at this time .  Resume aspirin when appropriate from a surgical standpoint.  physical therapy/OT  Spoke with Neurosurgery about the patient. Plan is for possible discharge if he does well with physical therapy today.    We will watch for imaging results to come back.       Chief Complaint   Patient presents with     Bilateral arm numbness     Bilateral leg weakness        HISTORY OF PRESENT ILLNESS     We have been requested by Dr. Law found to evaluate Ihsan Marcus who is a 52 year old  male for facial droop of unclear duration.  Spoke with neurosurgery who says that the patient has given different variations of the story with regards to the facial droop and there is not a whole lot in the chart regarding this.  Patient has history of TIA, bipolar disorder, chemical dependence, gastritis, asthma admitted for progressive cervical radiculopathy and myelopathy.  He is day 1 status-post decompression surgery.    Per Ms. Law's note 22:  Facial droop more noticeable today patient noted that it he has had facial droop for months unsure when it started will plan to consult neurology postoperatively with possible MRI brain      Ihsan says he has had the right-sided weakness in his face since a stroke in December.  He said he did not seek medical attention right away but a week later was told he had had a stroke.  Unclear where this evaluation was completed.  He says at first he also had some  clumsiness in his right hand that he noticed at work.  He is hoping to his group home today.  He is tolerating the nurse and myself that he had some normal sensation around his abdomen just after going to the bathroom today but now the sensation is back.  Ihsan says he is on a baby aspirin at home.     PAST MEDICAL & SURGICAL HISTORY     Medical History  Past Medical History:   Diagnosis Date     Cervical spondylosis with myelopathy 05/05/2022     Mild persistent asthma 04/27/2018     Surgical History  Past Surgical History:   Procedure Laterality Date     COLONOSCOPY N/A 8/13/2021    Procedure: COLONOSCOPY;  Surgeon: Stewart Monsalve MD;  Location: [facility]        SOCIAL HISTORY     Social History      FAMILY HISTORY     Reviewed, and family history includes Coronary Artery Disease in his father and mother; Diabetes in his mother.     ALLERGIES     Allergies   Allergen Reactions     Fish Swelling and Other (See Comments)     Facial swelling and sleepiness     Latex      Other reaction(s): Other (see comments)     Mushroom Hives     Olive Oil Unknown and Other (See Comments)     Peanut (Diagnostic)      Other reaction(s): Edema     Penicillins      Other reaction(s): Other (see comments)     Trazodone      Other reaction(s): Other (see comments)        REVIEW OF SYSTEMS     Pertinent items are noted in HPI.     HOME & HOSPITAL MEDICATIONS     Prior to Admission Medications  Medications Prior to Admission   Medication Sig Dispense Refill Last Dose     aspirin (ASA) 81 MG chewable tablet Take 1 tablet (81 mg) by mouth daily 90 tablet 3 5/5/2022 at am     calcium polycarbophil (FIBERCON) 625 MG tablet Take 2 tablets (1,250 mg) by mouth daily 60 tablet 11 5/5/2022 at am     cetirizine (ZYRTEC) 10 MG tablet Take 1 tablet (10 mg) by mouth daily 30 tablet 11 5/5/2022 at am     famotidine (PEPCID) 20 MG tablet Take 1 tablet (20 mg) by mouth 2 times daily as needed (for symptoms of reflux) 180 tablet 3 not recently     gabapentin  (NEURONTIN) 100 MG capsule Take 1 capsule (100 mg) by mouth 3 times daily as needed for neuropathic pain 90 capsule 0 not recently     guaiFENesin (ROBITUSSIN) 100 MG/5ML liquid Take 10 mLs (200 mg) by mouth every 4 hours as needed for cough 473 mL 3 not recently     ibuprofen (ADVIL/MOTRIN) 600 MG tablet Take 1 tablet (600 mg) by mouth every 6 hours as needed for moderate pain 90 tablet 0 not recently     ketoconazole (NIZORAL) 2 % external shampoo Apply topically daily as needed for itching or irritation 100 mL 1 not recently     melatonin 3 MG tablet Take 6 mg by mouth nightly as needed    not recently     Menthol-Camphor (ICY HOT ADVANCED PAIN RELIEF) 16-11 % CREA Externally apply 2 g topically 5 x daily PRN (for pain on shoulder for muscle spasm and joint pain) 56 g 0 not recently     OLANZapine (ZYPREXA) 15 MG tablet Take 15 mg by mouth At Bedtime   5/4/2022 at pm     rosuvastatin (CRESTOR) 10 MG tablet TAKE ONE TABLET BY MOUTH EVERY DAY 90 tablet 3 5/5/2022 at am     salicylic acid (COMPOUND W MAX STRENGTH) 17 % external gel Apply topically daily TO LESIONS ON FEET UNTIL CLEAR. (Patient taking differently: Apply topically daily as needed TO LESIONS ON FEET UNTIL CLEAR.) 7 g 1 not recently     senna-docusate (SENOKOT-S/PERICOLACE) 8.6-50 MG tablet Take 1 tablet by mouth daily as needed for constipation 90 tablet 2 5/5/2022 at am     tiZANidine (ZANAFLEX) 4 MG capsule Take 1 capsule (4 mg) by mouth 3 times daily As needed for muscle spasm 20 capsule 0 not recently     diclofenac (VOLTAREN) 1 % topical gel Apply 4 g topically 4 times daily As needed for pain in the neck and upper right arm. (Patient not taking: Reported on 5/5/2022) 150 g 1 Not Taking at Unknown time       Hospital Medications    acetaminophen  975 mg Oral Q8H     [Held by provider] aspirin  81 mg Oral Daily     [Held by provider] calcium polycarbophil  1,250 mg Oral Daily     [START ON 5/8/2022] enoxaparin ANTICOAGULANT  40 mg Subcutaneous Q24H      multivitamin, therapeutic  1 tablet Oral Daily     nicotine  1 patch Transdermal Daily     nicotine   Transdermal Q8H     OLANZapine  15 mg Oral At Bedtime     polyethylene glycol  17 g Oral Daily     [Held by provider] rosuvastatin  10 mg Oral Daily     senna-docusate  1 tablet Oral BID     sodium chloride (PF)  3 mL Intracatheter Q8H        PHYSICAL EXAM     Vital signs  Temp:  [97.2  F (36.2  C)-98.4  F (36.9  C)] 98.3  F (36.8  C)  Pulse:  [] 101  Resp:  [13-20] 18  BP: (119-157)/(69-90) 133/90  MAP:  [92 mmHg-98 mmHg] 97 mmHg  Arterial Line BP: (139-143)/(72-74) 142/73  SpO2:  [94 %-100 %] 96 %    Weight:   [unfilled]    General Physical Exam: Patient is alert and oriented x 3. Vital signs were reviewed and are documented in EMR. Neck was supple.  No carotid bruit, thyromegaly, JVD or lymphadenopathy noted.  Neurological Exam:  Mental status: Alert, attentive.  Speech: Fluent, childlike.  Cranial nerves: Slight drooping of the mouth on the right at rest, smile is nearly symmetric.  Otherwise 2-12 intact.  Motor: 5/5 in the upper and lower extremities.  Reflexes: Remains a little bit brisk at the knees (R>L)  No clonus.  Sensory: Intact light touch throughout.  Coordination: Normal finger tapping and heel-to-shin bilaterally.  Gait: Deferred for safety.     DIAGNOSTIC STUDIES     Pertinent Radiology   Radiology Results: Pending.    MRI Brain  pending    Pertinent Labs   Lab Results: No new labs to review.  No results found for this or any previous visit (from the past 24 hour(s)).    Total time spent for face to face visit, reviewing labs/imaging studies, counseling and coordination of care was: 1 Hour. More than 50% of this time was spent on counseling and coordination of care.    Dragon software used in the dictation of this note.    Hattie Carrion MD  [facility] - [address], [address]  [place] [address] [phone]

## 2022-05-07 NOTE — PROGRESS NOTES
Brief neurology note:    MRI/MRA reviewed.  No evidence of recent/acute stroke, no significant vessel stenosis.  Okay to to discharge from a neurology standpoint.  We will sign off.    Hattie Carrion MD

## 2022-05-07 NOTE — PROGRESS NOTES
Physical Therapy Discharge Summary    Reason for therapy discharge:    All goals and outcomes met, no further needs identified.    Progress towards therapy goal(s). See goals on Care Plan in Epic electronic health record for goal details.  Goals met    Therapy recommendation(s):    Continued therapy is recommended.  Rationale/Recommendations:  OP PT when cleared by surgeon as pt had been attending this prior to hospitalization.     Pt mobilizing well, no barriers to discharge from PT perspective. Orders in for cane, will issue prior to discharge. Thank you!

## 2022-05-07 NOTE — PLAN OF CARE
Problem: Pain Acute  Goal: Acceptable Pain Control and Functional Ability  Intervention: Prevent or Manage Pain  Recent Flowsheet Documentation  Taken 5/6/2022 1900 by Cayetano Collazo RN  Medication Review/Management: medications reviewed     Problem: Surgical Site Infection  Goal: Absence of Infection Signs and Symptoms  Outcome: Ongoing, Progressing     Problem: Mobility Impairment  Goal: Optimal Mobility  Outcome: Ongoing, Progressing   Goal Outcome Evaluation:        Patient arrived on P4 at 1900. CMS intact, patient has 75 ml hr NS running. Patient has small doll he keeps for comfort. Patient able to stand and walk to bathroom with assist of staff and gait belt. Patient urinated and PVR was 56. Patient verbalized discomfort but didn't want anything past tylenol. Patient was reminded that there were other pain meds available. Patient verbalized understanding.Dressing on neck CDI. HEIDI drain had 5 ml out.

## 2022-05-08 ENCOUNTER — TELEPHONE (OUTPATIENT)
Dept: NEUROSURGERY | Facility: CLINIC | Age: 52
End: 2022-05-08
Payer: MEDICARE

## 2022-05-08 DIAGNOSIS — M47.12 CERVICAL SPONDYLOSIS WITH MYELOPATHY: Primary | ICD-10-CM

## 2022-05-08 NOTE — CONFIDENTIAL NOTE
1 level ACDF 5/6/2022 with Dr Gage    7-10 day wound check   6 weeks with IVÁN and XR    JOSEFINA Flanagan-CNP  [facility]  O: [phone]

## 2022-05-08 NOTE — PLAN OF CARE
Occupational Therapy Discharge Summary    Reason for therapy discharge:    Discharged to home.    Progress towards therapy goal(s). See goals on Care Plan in [facility] electronic health record for goal details.  Goals partially met.  Barriers to achieving goals:   discharge from facility.    Therapy recommendation(s):    No further therapy is recommended.

## 2022-05-09 RX ORDER — FAMOTIDINE 20 MG/1
20 TABLET, FILM COATED ORAL 2 TIMES DAILY PRN
Qty: 180 TABLET | Refills: 3 | Status: SHIPPED | OUTPATIENT
Start: 2022-05-09 | End: 2023-06-15

## 2022-05-09 RX ORDER — MULTIVITAMIN WITH FOLIC ACID 400 MCG
1 TABLET ORAL DAILY
Qty: 30 TABLET | Refills: 0 | Status: SHIPPED | OUTPATIENT
Start: 2022-05-09 | End: 2022-05-31

## 2022-05-09 NOTE — SIGNIFICANT EVENT
Multivitamin prescribed at discharge not covered by insurance. Changed to Tab-a-corrine.    Laura Cardoza MD  [facility] Res[facility], PGY-3

## 2022-05-09 NOTE — OP NOTE
NEUROSURGERY OPERATIVE REPORT     DATE OF SERVICE: 5/6/2022     PREOPERATIVE DIAGNOSES:  Cervical spondylosis with myelopathy   Spinal stenosis in cervical region  Cord compression   Weakness     POSTOPERATIVE DIAGNOSES:  same    PROCEDURE:   1.  Anterior cervical discectomy and arthrodesis cervical 5-cervical 6  2.  Use of Titan interbody with use of allograft (Jarad) at cervical 5-cervical 6  3.  Use of anterior cervical plate,   Zevo (Medtronic)  4.  Use of operating room microscope.   5.  Use of neuro monitoring     SURGEON: Laurie Gage MD    ASSISTANT: Ruthann Law PA-C     INDICATIONS:  Ihsan Marcus is a 52 year old male who presents with a history of bipolar 1 disorder, chemical dependence, gastritis, GERD, asthma, TIA who resides in a group home presents with 3 months of progressive cervical radiculopathy and myelopathy symptoms.  Patient is very myelopathic on exam including hyperreflexia, bilateral Neil's, Clonus and diffuse weakness worse in his  and hip flexion bilaterally.  MRI of his cervical spine shows a very large disc herniation at cervical 5-6 level which contributes to very severe spinal canal canal stenosis with cord compression and cord signal abnormality.  The disc extends caudally behind the cervical 6 body.  He also has moderate spinal canal stenosis at both cervical 3-4 and cervical 4-5 level.  Additionally has foraminal narrowing moderate left greater than right at cervical 4-5 and severe left and moderate right at cervical 3-4.  Patient's x-ray shows straightening of the normal lordosis lordosis. Given his progressive myelopathy symptoms in addition to radiculopathy in the setting of severe compression of his spinal cord, I recommend urgent surgery including a cervical 5-cervical 6 anterior cervical decompression and fusion with plate to decompress the spinal cord.  Discussed with patient that he does have stenosis at the cervical 3-5 levels and may be at higher  risk of needing additional surgery at these levels in the future.  He is an active every day smoker and has a higher risk for fusion failure.  Recommend immediate smoking cessation. Risks of anterior neck surgery include but are not limited to inadequate symptom relief, nerve or spinal cord damage, durotomy, hematoma, infection, injury to trachea or esophagus, speech disturbance from injury to the recurrent laryngeal nerve, swallowing difficulties, failed fusion.  Risks were discussed with both patient and his legal guardian Anat Andrew.  Both agreed to proceed with surgery and appeared to understand the risks and benefits.     PROCEDURE:  After obtaining informed consent, the patient was brought to the operating room with TEDs and pneumatic stockings in place.  IV antibiotics was administered. He was intubated under general endotracheal anesthesia.  He was positioned supine on the operating room table with a bump under the shoulders support behind the neck and the head cradled in a soft headrest leaving the neck in a slightly extended position.  His shoulders were taped and retracted as needed to expose the anterior neck for a skin incision. He was prepped and draped in the usual sterile fashion.        The right sided incision was opened sharply and extended down to the platysma.  The platysma was opened in one layer with sharp dissection and undercut superiorly and inferiorly for ease of reapproximation at the end of the procedure.  We next continued the dissection medial to this sternocleidomastoid muscle.   After identifying the carotid and gently retracting it laterally, as well as identifying the trachea and esophagus which were gently retracted medially with hand-held retractors, to open the dissection.  We continued the dissection sharply and once reaching the loose  Areolar plane we used blunt dissection down to the anterior surface of the vertebral bodies.   We placed a short marker needle in the disc  space to verify levels with a lateral x-ray, cervical 5-6.     Once levels were confirmed we then proceeded to elevate the longus coli muscles bilaterally and placed our retractors.  We then used a [place] retractor system  into the anterior surface of cervical 5 and cervical 6 used to achieve distraction after cutting the anterior ligament. The operating microscope was the utilized for the remainder of the case.  The disc was removed with a combination of high speed air drill, Kerrison, curettes and pituitary grabbers until the end plates were free of disc material. The posterior longitudinal ligament was opened with a blunt nerve hook. The remainder of the disc was removed with Kerrison rongeurs and pituitary grabbers. Of note he had significant amounts of disc behind the C6 body on the left that was removed. We verified that the foramen were open with a blunt tip nerve hook. We also removed the cervical 5 overhang with Kerrison rongeur. Next we trialed our interbody graft.  An interbody graft pre packed with Jarad was tapped into position using a slight amount of distraction on the [place] pins.  With the graft in good position we released the [place] pins and verified a nice solid fit.  We removed the [place] distractor pins and filled the screw holes with bone wax.  We next fitted an anterior cervical plate plate which was secured into position with four screws drilled into the anterior surfaces of cervical 5 and cervical 6.  Lateral and AP x-ray was taken prior to the patient leaving the operating room.      An assistant was needed for retraction, positioning and closure.     Once the construct was in good position, we copiously irrigated the incision with antibiotic-containing saline and achieved hemostasis, and remove the retractor system.  We verified good pulsation in the carotid.  We verified no injury to the trachea /esophagus.  A drain was placed. We closed the platysma with interrupted 2-0 Vicryl ,   inverted 2-0 Vicryl to approximate the subcutaneous tissues to approximate the skin edges.   Exofin was placed. Sponge and needle counts were correct prior to closure x2.      Patient tolerated the procedure well, was taken to the recovery room at neurological baseline with no new deficits appreciated.     Estimated blood loss: 25 ml    Specimens: none    Findings:Large disc herniation resulting in severe cord compression    Implants:   Implant Name Type Inv. Item Serial No.  Lot No. LRB No. Used Action   GRAFT BONE FIBERS LAM DBM DBF 1ML [id number] - [id number] Bone/Tissue/Biologic GRAFT BONE FIBERS LAM DBM DBF 1ML [id number] [employer]  N/A 1 Implanted   CAGE SPNL 7MM TTN 6D MED CRV [id number] - [id number] Metallic Hardware/[place] CAGE SPNL 7MM TTN 6D MED CRV [id number]  [employer] [id number] N/A 1 Implanted   IMP PLATE CERV MEDT ZEVO 23MM 1 LVL [id number] - [id number] Metallic Hardware/[place] IMP PLATE CERV MEDT ZEVO 23MM 1 LVL [id number]  [employer]  N/A 1 Implanted   IMP SCR MEDT ZEVO 3.5X17MM SD VA [id number] - [id number] Metallic Hardware/[place] IMP SCR MEDT ZEVO 3.5X17MM SD VA [id number]  [employer]  N/A 4 Implanted          Laurie Gage MD    Cc: Mayda Albarran

## 2022-05-10 ENCOUNTER — TELEPHONE (OUTPATIENT)
Dept: NEUROSURGERY | Facility: CLINIC | Age: 52
End: 2022-05-10
Payer: MEDICARE

## 2022-05-10 NOTE — TELEPHONE ENCOUNTER
"Returned call to patient who is s/p C5-6 ACDF with Dr. Gage on 5/6/22.     He reports he \"feels great\", minor neck pain and some tingling in his left leg with continued weakness. Reports he still \"cannot make it to the bathroom.\"     Explained to the patient that we recommend that he not return to work right now, and we can discuss when it will be safe for him to go back to work at his nurse visit on 5/13. Pt verbalized agreement with this.     He works at \"the \" answering phones, working on the computer and doing \"some cleaning.\"     Kalyee Zapata RN     "

## 2022-05-11 DIAGNOSIS — L84 CALLUS OF FOOT: ICD-10-CM

## 2022-05-11 NOTE — TELEPHONE ENCOUNTER
Routing refill request to provider for review/approval because:  Drug not on the [facility] refill protocol     Pending Prescriptions:                       Disp   Refills    salicylic acid (COMPOUND W MAX STRENGTH) 1*7 g    1        Sig: Apply topically daily TO LESIONS ON FEET UNTIL CLEAR.    Carolyn aGrcia RN on 5/11/2022 at 10:48 AM

## 2022-05-13 ENCOUNTER — ALLIED HEALTH/NURSE VISIT (OUTPATIENT)
Dept: NEUROSURGERY | Facility: CLINIC | Age: 52
End: 2022-05-13
Payer: MEDICARE

## 2022-05-13 VITALS
OXYGEN SATURATION: 97 % | DIASTOLIC BLOOD PRESSURE: 76 MMHG | HEART RATE: 85 BPM | SYSTOLIC BLOOD PRESSURE: 126 MMHG | RESPIRATION RATE: 16 BRPM

## 2022-05-13 DIAGNOSIS — M47.12 CERVICAL SPONDYLOSIS WITH MYELOPATHY: Primary | ICD-10-CM

## 2022-05-13 PROCEDURE — 99207 PR NO CHARGE NURSE ONLY: CPT

## 2022-05-13 NOTE — PATIENT INSTRUCTIONS
Wear your cervical collar when you are up and out of bed.   Use a cane or walker to help you feel stable.   Remain out of work until 06/16/2022.     WOUND CARE:  A dressing is not required.      Wash your hands before touching the wound.  Ensure that anyone assisting you in the care of your wound washes her/his hands before touching the wound. Good handwashing can decrease the risk of serious infection.    If you are unable to see your wound, have someone check the wound daily for redness, swelling,or drainage.   You may shower. Wash your wound daily.  Apply mild soap directly to the wound, form a light lather and rinse with running water. Pat the wound dry.  Do not rub, pick, or otherwise help the dermabond come off more quickly.  Do not place tape or adhesive over the dermabond.    Do not submerge the wound under water. No baths or swimming for 6 weeks.     If you develop redness, swelling, drainage, or temp 101 or greater, call our office at [phone]    Activity Restrictions:  *No lifting, pushing or pulling greater than 5-10 pounds (this is about a gallon ofmilk).  *No repetitive bending, twisting, or jarring activities  *No overhead work  *No aerobic or strenuous activity  *No activities with increased risk of falls  *You may move about your home as tolerated  *You may walk up and down stairs as tolerated  *You may increase your activity slowly over the next 4-6 weeks    WALKING PROGRAM: As you can tolerate, walk daily-start with 5-10 minutes of continuous walking. This is in addition to the walking that you do as part of your daily activities. Increase the time that you walk by 5 minutes every couple of days. Do not exceed 30-45 minutes of continuous walking until seen in follow-up. Walking is the best exercise after surgery.  **Listen to your body, if you find that you are more painful or fatigued, you may need to proceed more slowly.    **Do not smoke or expose yourself to second handsmoke. Cigarette  smoke can delay healing and cause complications.     DRIVING:  We recommend that you do not drive while taking medications for pain or muscle spasms. Always read and follow the advice on your prescription bottle. If you have questions, speak with your pharmacist. We recommend that you do not drive while wearing a brace, as it could limit your range of motion.    WORK: If you plan to return to work before your 6 week appointment, call and discuss with one of the nurses in the neurosurgery office.

## 2022-05-13 NOTE — PROGRESS NOTES
Patient presents today for post-op incision check.     DOS: 05/06/2022  Procedure: C5-6 ACDF   Surgeon: Too    Pt is 7 days post op, and here today for a wound check. Is accompanied by as staff member from his group home. He presented preoperatively with myelopathy. Today, he reports numbness in his right arm/hand, improved gait, minimal neck/shoulder pain, some discomfort when swallowing. Reported some constipation and continued urinary urgency/incontinence. Gait and balance are still affected, pt is not steady on his feet. However, he feels his gait is improved.     Ihsan is not wearing a cervical collar today and is not compliant with use at home. He does not use a cane for stability when ambulating as advised. Discussed use of cane and proper collar use. Explained safety and reasoning behind collar use and consequences of not using collar. Discuseed smoking/nicotine use and consequesce of use after fusion surgery. Pt verbalized understanding.     Taking tylenol for pain with adequate relief.     Activity restrictions reinforced at this time as outlined the After Visit Summary.     Surgical wound WNL - C/D/I, no signs of infection or skin breakdown.  Incision well-healed: good skin approximation, no redness or visible/palpable edema, no tenderness to palpation.  Pt denies fever, chills or sweats.    Return to work discussed at this appointment? Two letters written per pt's request. One for his employer and one for his . Pt agreed to remain out of work until his 6 week appt given his gait instability and noncompliance with collar use and risk of injury. Reiterated three times the importance of collar and cane use and activity restrictions and the consequences of injury and prolonged recovery.     All of patient's questions addressed today. He was instructed to call with any additional questions/concerns. Follow up appts made.     Kaylee Zapata RN

## 2022-05-13 NOTE — LETTER
5/13/2022     Patient: Ihsan Marcus   YOB: 1970  Date of visit: 5/13/2022       To whom it may concern,     It is my medical opinion that Ihsan Marcus should remain out of work until 06/16/2022 as he has undergone cervical spine surgery.     If you have any questions or concerns, please don't hesitate to call.     Sincerely,       Electronically signed by Dr. Laurie Gage                       Likely prerenal given hx and response to IVF, continue current care, Nephrology input pending, monitor.

## 2022-05-13 NOTE — LETTER
5/13/2022     Patient: Ihsan Marcus   YOB: 1970  Date of visit: 5/13/2022       To whom it may concern,     It is my medical opinion that Ihsan Marcus should remain out of work until 06/16/2022. He as undergone cervical spine surgery and is recovering at home. He will not have active income during this period and will need access to the funds in his bank account for living expenses. Please allow him access to these funds until he is cleared to return to work. His next follow up appointment is on 06/16/2022 and his ability to return to work will be determined at that visit.     If you have any questions or concerns, please don't hesitate to call.     Sincerely,       Electronically signed by Dr. Laurie Gage

## 2022-05-24 ENCOUNTER — TELEPHONE (OUTPATIENT)
Dept: NEUROSURGERY | Facility: CLINIC | Age: 52
End: 2022-05-24
Payer: MEDICARE

## 2022-05-24 DIAGNOSIS — Z98.1 S/P CERVICAL SPINAL FUSION: ICD-10-CM

## 2022-05-24 DIAGNOSIS — G95.20 CERVICAL CORD COMPRESSION WITH MYELOPATHY (H): Primary | ICD-10-CM

## 2022-05-24 NOTE — PROGRESS NOTES
[facility]    Outpatient Physical Therapy Discharge Note  Patient: Ihsan Marcus  : 1970    Beginning/End Dates of Reporting Period:  22 for evaluation only    Referring Provider: Dr. Payan    Therapy Diagnosis: Cervical Pain     Client Self Report: see eval    Objective Measurements:  Objective Measure: NDI: 44             Goals:  Goal Identifier lift   Goal Description Pt will be able to lift obejcts >10# such as laundry   Target Date 22   Date Met      Progress (detail required for progress note):       Goal Identifier turning head   Goal Description Pt will be able to turn head with more ease as AROM improves by 10 degrees   Target Date 22   Date Met      Progress (detail required for progress note):       Goal Identifier     Goal Description     Target Date     Date Met      Progress (detail required for progress note):       Goal Identifier     Goal Description     Target Date     Date Met      Progress (detail required for progress note):       Goal Identifier     Goal Description     Target Date     Date Met      Progress (detail required for progress note):       Goal Identifier     Goal Description     Target Date     Date Met      Progress (detail required for progress note):       Goal Identifier     Goal Description     Target Date     Date Met      Progress (detail required for progress note):       Goal Identifier     Goal Description     Target Date     Date Met      Progress (detail required for progress note):             Plan:  Discharge from therapy.    Discharge:    Reason for Discharge: pt had cervical surgery.     Equipment Issued:     Discharge Plan: Patient to continue home program.

## 2022-05-24 NOTE — ADDENDUM NOTE
Encounter addended by: Maureen Ponce, PT on: 5/24/2022 12:55 PM   Actions taken: Episode resolved, Clinical Note Signed

## 2022-05-24 NOTE — PROGRESS NOTES
Per PT scheduling, pt reached out to schedule post-op PT. New order placed for pt to begin PT when he is 6 weeks post-op. This was discussed with him at his wound check appt on 5/13/22.     Kaylee Zapata RN

## 2022-06-14 ENCOUNTER — OFFICE VISIT (OUTPATIENT)
Dept: FAMILY MEDICINE | Facility: CLINIC | Age: 52
End: 2022-06-14
Payer: MEDICARE

## 2022-06-14 VITALS
DIASTOLIC BLOOD PRESSURE: 60 MMHG | OXYGEN SATURATION: 100 % | HEIGHT: 73 IN | HEART RATE: 105 BPM | SYSTOLIC BLOOD PRESSURE: 120 MMHG | TEMPERATURE: 96.8 F | BODY MASS INDEX: 23.36 KG/M2 | WEIGHT: 176.25 LBS

## 2022-06-14 DIAGNOSIS — F51.04 PSYCHOPHYSIOLOGICAL INSOMNIA: ICD-10-CM

## 2022-06-14 DIAGNOSIS — Z13.1 DIABETES MELLITUS SCREENING: ICD-10-CM

## 2022-06-14 DIAGNOSIS — E78.5 HYPERLIPIDEMIA, UNSPECIFIED HYPERLIPIDEMIA TYPE: ICD-10-CM

## 2022-06-14 DIAGNOSIS — B36.0 TINEA VERSICOLOR: ICD-10-CM

## 2022-06-14 DIAGNOSIS — J45.30 MILD PERSISTENT ASTHMA WITHOUT COMPLICATION: ICD-10-CM

## 2022-06-14 DIAGNOSIS — R79.89 OTHER SPECIFIED ABNORMAL FINDINGS OF BLOOD CHEMISTRY: ICD-10-CM

## 2022-06-14 DIAGNOSIS — K59.01 SLOW TRANSIT CONSTIPATION: ICD-10-CM

## 2022-06-14 DIAGNOSIS — Z12.5 SCREENING FOR PROSTATE CANCER: ICD-10-CM

## 2022-06-14 DIAGNOSIS — Z00.00 ENCOUNTER FOR MEDICARE ANNUAL WELLNESS EXAM: Primary | ICD-10-CM

## 2022-06-14 LAB
CHOLEST SERPL-MCNC: 146 MG/DL
FASTING STATUS PATIENT QL REPORTED: NORMAL
HBA1C MFR BLD: 5.6 % (ref 0–5.6)
HDLC SERPL-MCNC: 57 MG/DL
LDLC SERPL CALC-MCNC: 81 MG/DL
PSA SERPL-MCNC: 0.77 UG/L (ref 0–3.5)
TRIGL SERPL-MCNC: 38 MG/DL

## 2022-06-14 PROCEDURE — 80061 LIPID PANEL: CPT | Performed by: FAMILY MEDICINE

## 2022-06-14 PROCEDURE — 36415 COLL VENOUS BLD VENIPUNCTURE: CPT | Performed by: FAMILY MEDICINE

## 2022-06-14 PROCEDURE — 83036 HEMOGLOBIN GLYCOSYLATED A1C: CPT | Performed by: FAMILY MEDICINE

## 2022-06-14 PROCEDURE — G0439 PPPS, SUBSEQ VISIT: HCPCS | Performed by: FAMILY MEDICINE

## 2022-06-14 PROCEDURE — G0103 PSA SCREENING: HCPCS | Performed by: FAMILY MEDICINE

## 2022-06-14 RX ORDER — LANOLIN ALCOHOL/MO/W.PET/CERES
6 CREAM (GRAM) TOPICAL
Qty: 90 TABLET | Refills: 3 | Status: SHIPPED | OUTPATIENT
Start: 2022-06-14

## 2022-06-14 RX ORDER — ALBUTEROL SULFATE 90 UG/1
2 AEROSOL, METERED RESPIRATORY (INHALATION) EVERY 4 HOURS PRN
Qty: 18 G | Refills: 0 | Status: SHIPPED | OUTPATIENT
Start: 2022-06-14 | End: 2023-08-11

## 2022-06-14 RX ORDER — KETOCONAZOLE 20 MG/ML
SHAMPOO TOPICAL DAILY PRN
Qty: 100 ML | Refills: 1 | Status: SHIPPED | OUTPATIENT
Start: 2022-06-14 | End: 2023-08-11

## 2022-06-14 RX ORDER — AMOXICILLIN 250 MG
1 CAPSULE ORAL DAILY PRN
Qty: 90 TABLET | Refills: 2 | Status: SHIPPED | OUTPATIENT
Start: 2022-06-14 | End: 2023-08-11

## 2022-06-14 ASSESSMENT — ENCOUNTER SYMPTOMS
CHILLS: 0
SORE THROAT: 0
NAUSEA: 0
COUGH: 0
FREQUENCY: 0
SHORTNESS OF BREATH: 0
FEVER: 0
NERVOUS/ANXIOUS: 0
DYSURIA: 0
CONSTIPATION: 0
JOINT SWELLING: 0
DIARRHEA: 0
HEMATURIA: 0
MYALGIAS: 0
ARTHRALGIAS: 0
EYE PAIN: 0
PARESTHESIAS: 0
DIZZINESS: 0
HEMATOCHEZIA: 0
PALPITATIONS: 0
HEARTBURN: 0
ABDOMINAL PAIN: 0
WEAKNESS: 0
HEADACHES: 0

## 2022-06-14 ASSESSMENT — ASTHMA QUESTIONNAIRES
QUESTION_4 LAST FOUR WEEKS HOW OFTEN HAVE YOU USED YOUR RESCUE INHALER OR NEBULIZER MEDICATION (SUCH AS ALBUTEROL): NOT AT ALL
ACT_TOTALSCORE: 20
ACUTE_EXACERBATION_TODAY: NO
QUESTION_5 LAST FOUR WEEKS HOW WOULD YOU RATE YOUR ASTHMA CONTROL: SOMEWHAT CONTROLLED
ACT_TOTALSCORE: 20
QUESTION_1 LAST FOUR WEEKS HOW MUCH OF THE TIME DID YOUR ASTHMA KEEP YOU FROM GETTING AS MUCH DONE AT WORK, SCHOOL OR AT HOME: A LITTLE OF THE TIME
QUESTION_2 LAST FOUR WEEKS HOW OFTEN HAVE YOU HAD SHORTNESS OF BREATH: ONCE OR TWICE A WEEK
QUESTION_3 LAST FOUR WEEKS HOW OFTEN DID YOUR ASTHMA SYMPTOMS (WHEEZING, COUGHING, SHORTNESS OF BREATH, CHEST TIGHTNESS OR PAIN) WAKE YOU UP AT NIGHT OR EARLIER THAN USUAL IN THE MORNING: ONCE OR TWICE

## 2022-06-14 ASSESSMENT — PAIN SCALES - GENERAL: PAINLEVEL: NO PAIN (0)

## 2022-06-14 ASSESSMENT — ACTIVITIES OF DAILY LIVING (ADL): CURRENT_FUNCTION: NO ASSISTANCE NEEDED

## 2022-06-14 NOTE — PATIENT INSTRUCTIONS
Patient Education   Personalized Prevention Plan  You are due for the preventive services outlined below.  Your care team is available to assist you in scheduling these services.  If you have already completed any of these items, please share that information with your care team to update in your medical record.  Health Maintenance Due   Topic Date Due     COVID-19 Vaccine (1) Never done     Pneumococcal Vaccine (2 - PCV) 09/17/2019     Zoster (Shingles) Vaccine (1 of 2) Never done     LUNG CANCER SCREENING  Never done     Asthma Action Plan - yearly  03/08/2022     Asthma Control Test  03/23/2022     Preventive Health Recommendations  See your health care provider every year to    Review health changes.     Discuss preventive care.      Review your medicines if your doctor has prescribed any.    Talk with your health care provider about whether you should have a test to screen for prostate cancer (PSA).    Every 3 years, have a diabetes test (fasting glucose). If you are at risk for diabetes, you should have this test more often.    Every 5 years, have a cholesterol test. Have this test more often if you are at risk for high cholesterol or heart disease.     Every 10 years, have a colonoscopy. Or, have a yearly FIT test (stool test). These exams will check for colon cancer.    Talk to with your health care provider about screening for Abdominal Aortic Aneurysm if you have a family history of AAA or have a history of smoking.    Shots:     Get a flu shot each year.     Get a tetanus shot every 10 years.     Talk to your doctor about your pneumonia vaccines. There are now two you should receive - Pneumovax (PPSV 23) and Prevnar (PCV 13).    Talk to your pharmacist about a shingles vaccine.     Talk to your doctor about the hepatitis B vaccine.    Nutrition:     Eat at least 5 servings of fruits and vegetables each day.     Eat whole-grain bread, whole-wheat pasta and brown rice instead of white grains and rice.      Get adequate Calcium and Vitamin D.     Lifestyle    Exercise for at least 150 minutes a week (30 minutes a day, 5 days a week). This will help you control your weight and prevent disease.     Limit alcohol to one drink per day.     No smoking.     Wear sunscreen to prevent skin cancer.     See your dentist every six months for an exam and cleaning.     See your eye doctor every 1 to 2 years to screen for conditions such as glaucoma, macular degeneration and cataracts.    Personalized Prevention Plan  You are due for the preventive services outlined below.  Your care team is available to assist you in scheduling these services.  If you have already completed any of these items, please share that information with your care team to update in your medical record.  Health Maintenance   Topic Date Due     COVID-19 Vaccine (1) Never done     Pneumococcal Vaccine: Pediatrics (0 to 5 Years) and At-Risk Patients (6 to 64 Years) (2 - PCV) 09/17/2019     ZOSTER IMMUNIZATION (1 of 2) Never done     LUNG CANCER SCREENING  Never done     ASTHMA ACTION PLAN  03/08/2022     ASTHMA CONTROL TEST  03/23/2022     INFLUENZA VACCINE (Season Ended) 09/01/2022     ANNUAL REVIEW OF HM ORDERS  02/08/2023     MEDICARE ANNUAL WELLNESS VISIT  06/14/2023     LIPID  12/04/2025     ADVANCE CARE PLANNING  06/14/2027     DTAP/TDAP/TD IMMUNIZATION (5 - Td or Tdap) 09/17/2028     COLORECTAL CANCER SCREENING  08/13/2031     HEPATITIS C SCREENING  Completed     HIV SCREENING  Completed     PHQ-2 (once per calendar year)  Completed     HEPATITIS B IMMUNIZATION  Completed     IPV IMMUNIZATION  Aged Out     MENINGITIS IMMUNIZATION  Aged Out

## 2022-06-14 NOTE — PROGRESS NOTES
"SUBJECTIVE:   Ihsan Marcus is a 52 year old male who presents for Preventive Visit.    Are you in the first 12 months of your Medicare coverage?  No    Healthy Habits:     In general, how would you rate your overall health?  Good    Frequency of exercise:  2-3 days/week    Duration of exercise:  Less than 15 minutes    Do you usually eat at least 4 servings of fruit and vegetables a day, include whole grains    & fiber and avoid regularly eating high fat or \"junk\" foods?  Yes    Taking medications regularly:  Yes    Medication side effects:  No significant flushing and Lightheadedness    Ability to successfully perform activities of daily living:  No assistance needed    Home Safety:  No safety concerns identified    Hearing Impairment:  No hearing concerns    In the past 6 months, have you been bothered by leaking of urine?  No    In general, how would you rate your overall mental or emotional health?  Good      PHQ-2 Total Score: 0    Additional concerns today:  No  seeing Dr Gage/julianne on Thurs - doing much better    Do you feel safe in your environment? Yes    Have you ever done Advance Care Planning? (For example, a Health Directive, POLST, or a discussion with a medical provider or your loved ones about your wishes): No, advance care planning information given to patient to review.  Patient declined advance care planning discussion at this time.       Fall risk       Cognitive Screening   1) Repeat 3 items (Leader, Season, Table)   2) Clock draw: NORMAL  3) 3 item recall: Recalls 1 object   Results: ABNORMAL clock, 1-2 items recalled: PROBABLE COGNITIVE IMPAIRMENT, **INFORM PROVIDER**  Hx mental health disorders and TIA    Reviewed and updated as needed this visit by clinical staff   Tobacco  Allergies  Meds  Problems  Med Hx  Surg Hx  Fam Hx            Reviewed and updated as needed this visit by Provider   Tobacco  Allergies  Meds  Problems  Med Hx  Surg Hx  Fam Hx           Social " History     Tobacco Use     Smoking status: Current Every Day Smoker     Packs/day: 0.50     Years: 23.00     Pack years: 11.50     Types: Cigarettes     Smokeless tobacco: Never Used     Tobacco comment: Slowing down a lot   Substance Use Topics     Alcohol use: No       Alcohol Use 6/14/2022   Prescreen: >3 drinks/day or >7 drinks/week? No   Prescreen: >3 drinks/day or >7 drinks/week? -     Current providers sharing in care for this patient include:   Patient Care Team:  Mayda Albarran APRN CNP as PCP - General (Nurse Practitioner - Family)  Felton Mays DPM as Assigned Musculoskeletal Provider  Mayda Albarran APRN CNP as Assigned PCP  Laurie Gage MD as Assigned Neuroscience Provider    The following health maintenance items are reviewed in Epic and correct as of today:  Health Maintenance Due   Topic Date Due     COVID-19 Vaccine (1) Never done     Pneumococcal Vaccine: Pediatrics (0 to 5 Years) and At-Risk Patients (6 to 64 Years) (2 - PCV) 09/17/2019     ZOSTER IMMUNIZATION (1 of 2) Never done     LUNG CANCER SCREENING  Never done     ASTHMA ACTION PLAN  03/08/2022     ASTHMA CONTROL TEST  03/23/2022     Review of Systems   Constitutional: Negative for chills and fever.   HENT: Negative for congestion, ear pain, hearing loss and sore throat.    Eyes: Negative for pain and visual disturbance.   Respiratory: Negative for cough and shortness of breath.    Cardiovascular: Negative for chest pain, palpitations and peripheral edema.   Gastrointestinal: Negative for abdominal pain, constipation, diarrhea, heartburn, hematochezia and nausea.   Genitourinary: Negative for dysuria, frequency, genital sores, hematuria, impotence, penile discharge and urgency.   Musculoskeletal: Negative for arthralgias, joint swelling and myalgias.   Skin: Negative for rash.   Neurological: Negative for dizziness, weakness, headaches and paresthesias.   Psychiatric/Behavioral: Negative for mood changes. The  "patient is not nervous/anxious.      OBJECTIVE:   /60   Pulse 105   Temp 96.8  F (36  C)   Ht 1.854 m (6' 1\")   Wt 79.9 kg (176 lb 4 oz)   SpO2 100%   BMI 23.25 kg/m   Estimated body mass index is 23.25 kg/m  as calculated from the following:    Height as of this encounter: 1.854 m (6' 1\").    Weight as of this encounter: 79.9 kg (176 lb 4 oz).  Physical Exam  GENERAL: healthy, alert and no distress, here with facility staff  EYES: Eyes grossly normal to inspection, conjunctivae and sclerae normal  RESP: lungs clear to auscultation - no rales, rhonchi or wheezes  CV: regular rate and rhythm, normal S1 S2, no S3 or S4, no murmur, click or rub, no peripheral edema and peripheral pulses strong  MS: no gross musculoskeletal defects noted, no edema, wearing neck brace  NEURO: Normal strength and tone, mentation intact and speech normal  PSYCH: mentation appears normal, affect normal/bright    ASSESSMENT / PLAN:   Ihsan was seen today for physical and medication problem.    Diagnoses and all orders for this visit:    Encounter for Medicare annual wellness exam  Annual wellness visit completed today.  Labs as below.  Up-to-date with colon cancer screening.    Hyperlipidemia, unspecified hyperlipidemia type  History of hyperlipidemia, currently taking statin therapy, will recheck level today.  -     Lipid panel reflex to direct LDL Non-fasting; Future    Diabetes mellitus screening  Given age, will screen for diabetes today with A1c.  -     Hemoglobin A1c; Future    Screening for prostate cancer  Given age, will check PSA today.  -     PSA, screen; Future    Tinea versicolor  Patient was given prescription recently by one of my partners for ketoconazole shampoo, patient reports he was not able to get this from the pharmacy, sent to correct pharmacy today.  -     ketoconazole (NIZORAL) 2 % external shampoo; Apply topically daily as needed for itching or irritation    Slow transit constipation  Patient request " "refill of senna to be used as needed, this was provided.  -     senna-docusate (SENOKOT-S/PERICOLACE) 8.6-50 MG tablet; Take 1 tablet by mouth daily as needed for constipation    Psychophysiological insomnia  Patient request refill of melatonin which was provided.  -     melatonin 3 MG tablet; Take 2 tablets (6 mg) by mouth nightly as needed for sleep        COUNSELING:  Reviewed preventive health counseling, as reflected in patient instructions       Regular exercise       Healthy diet/nutrition       Dental care       smoking cessation    Estimated body mass index is 23.25 kg/m  as calculated from the following:    Height as of this encounter: 1.854 m (6' 1\").    Weight as of this encounter: 79.9 kg (176 lb 4 oz).        He reports that he has been smoking cigarettes. He has a 11.50 pack-year smoking history. He has never used smokeless tobacco.  Tobacco Cessation Action Plan:   Information offered: Patient not interested at this time      Appropriate preventive services were discussed with this patient, including applicable screening as appropriate for cardiovascular disease, diabetes, osteopenia/osteoporosis, and glaucoma.  As appropriate for age/gender, discussed screening for colorectal cancer, prostate cancer, breast cancer, and cervical cancer. Checklist reviewing preventive services available has been given to the patient.    Reviewed patients plan of care and provided an AVS. The Basic Care Plan (routine screening as documented in Health Maintenance) for Ihsan meets the Care Plan requirement. This Care Plan has been established and reviewed with the Patient.      Lala Payan MD  [facility]    Identified Health Risks:  "

## 2022-06-16 ENCOUNTER — OFFICE VISIT (OUTPATIENT)
Dept: NEUROSURGERY | Facility: CLINIC | Age: 52
End: 2022-06-16
Payer: MEDICARE

## 2022-06-16 ENCOUNTER — HOSPITAL ENCOUNTER (OUTPATIENT)
Dept: RADIOLOGY | Facility: HOSPITAL | Age: 52
Discharge: HOME | End: 2022-06-16
Attending: NURSE PRACTITIONER | Admitting: NURSE PRACTITIONER
Payer: MEDICARE

## 2022-06-16 VITALS
DIASTOLIC BLOOD PRESSURE: 75 MMHG | HEIGHT: 73 IN | SYSTOLIC BLOOD PRESSURE: 116 MMHG | OXYGEN SATURATION: 99 % | HEART RATE: 105 BPM | BODY MASS INDEX: 23.33 KG/M2 | WEIGHT: 176 LBS

## 2022-06-16 DIAGNOSIS — M48.02 SPINAL STENOSIS IN CERVICAL REGION: ICD-10-CM

## 2022-06-16 DIAGNOSIS — M47.12 CERVICAL SPONDYLOSIS WITH MYELOPATHY: ICD-10-CM

## 2022-06-16 DIAGNOSIS — Z98.1 S/P CERVICAL SPINAL FUSION: Primary | ICD-10-CM

## 2022-06-16 PROCEDURE — 99024 POSTOP FOLLOW-UP VISIT: CPT | Performed by: NURSE PRACTITIONER

## 2022-06-16 PROCEDURE — 72040 X-RAY EXAM NECK SPINE 2-3 VW: CPT

## 2022-06-16 NOTE — LETTER
June 16, 2022      Ihsan Marcus  [address] [address]        To Whom It May Concern:    Ihsan Marcus was seen in our clinic. He may return to work with the following restrictions: no lifting over 30lbs. Effective until next routine follow up visit in 6 wks.      Sincerely,        TIERRA Fuller CNP

## 2022-06-16 NOTE — LETTER
6/16/2022         RE: Ihsan Marcus  [address] [address]        Dear Colleague,    Thank you for referring your patient, Ihsan Marcus, to the [facility]. Please see a copy of my visit note below.    Neurosurgery clinic note:      A/P:  Ihsan is a 53yo M smoker with hx bipolar 1, chemical dependence, gastritis, gerd, asthma, and tia who is approx 6wks sp C5-6 ACDF by Dr Gage on 05/06/2022. Preop c/o 3mos progressive myelopathy symptoms. He was sent directly to hospital following his surgical consult appt for next-day surgery     Ihsan is greatly improving. Numbness in arms and legs is gone. Little tingling in the hands every once in a while. He has noticed that when he lays on right side postop, his left arm goes numb - this is getting better. For example he accidentally slept on R side last night and didn't experience this. No neck, arm or leg pain, or dysphagia. Wearing collar. He was taking ibuprofen postop, but has been off of it for 3 wks. Still covering incision with gauze. Still smoking a little, but down a lot. 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 He is stronger on exam, sensation is better, walking is better. He is progressing very well. Discussed with Dr Gage who also saw patient for discussion at time of exam. She recommends weaning collar to off, PT, continue to work on complete smoking cessation. We will see Ihsan back in another 6 wks with new XR.      Plan:  1. Follow-up in person in 6wks with new cervical xr with [facility] on [place] clinic day  2. No need to cover incision  3. Wean collar to off. Advance activity slowly. No lifting over 30lbs.   4. No NSAIDs which we discussed. Tylenol ok  5. Stop smoking      Exam:    General: seated in NAD, appears comfortable. Wearing miami J collar which fits nicely      Strength:  Deltoids: 5/5 right, 5/5 left  Triceps: 5/5 right, 5/5 left  Biceps: 5/5 right, 5/5 left  Handgrips: 4+/5  right, 4+/5 left  Intrinsics: 5-/5 right, 5-/5 left  Extensors: 5/5 right, 5/5 left  Hip flexors: 5/5 right, 5/5 left  Quads: 5/5 right, 5/5 left  Hamstrings: 5/5 right, 5/5 left  Dorsiflexion: 5/5 right, 5/5 left  Plantarflexion 5/5 right, 5/5 left  EHL: 5/5 right, 5/5 left    Sensation is intact to light touch throughout upper and lower extremities bilaterally.    Gait is smooth and coordinated     Incision: covered with a gauze dressing which I removed. CDI with exofin    Imaging:  Personally reviewed the following imaging studies today. Imaging also shown to patient at time of appt. Reviewed by dr claire at time of appt     EXAM: XR CERVICAL SPINE 2/3 VWS  LOCATION: [facility]  DATE/TIME: 6/16/2022 10:34 AM     INDICATION: AP LAT upright in brace; post ACDF  COMPARISON: 05/07/2022  TECHNIQUE: CR Cervical Spine.                                                                      IMPRESSION: C5-C6 ACDF hardware without finding to suggest location.      Unchanged alignment. Unchanged 1 mm anterolisthesis at C4-C5. Vertebral body heights are maintained.      No displaced fracture. No aggressive osseous abnormality. Mild degenerative disc disease at C2-C3, C3-C4, and C6-C7. Normal facets. Prevertebral soft tissues and included lungs are normal.        Mary Beth Machuca FNP-C  [facility] Neurosurgery  O. [phone]          Again, thank you for allowing me to participate in the care of your patient.        Sincerely,        TIERRA Fuller CNP

## 2022-06-16 NOTE — LETTER
June 16, 2022      Ihsan Marcus  [address] [address]        To Whom It May Concern:    Ihsan Marcus was seen in our clinic. He is recovering from a surgery which would not have impact on his decision-making ability, however does have a legal guardian who is his medical decision maker. Please direct further questions to our clinic if needed.      Sincerely,        TIERRA Fuller CNP

## 2022-06-16 NOTE — PATIENT INSTRUCTIONS
Doing great  Exam is much improved    Xr looks good    Plan  Cleared collar - recommend weaning to off gradually, going without it for a few hours per day and adding hours out of it until out completely.  Ok to advance activity. Lifting 10-15lbs, increase by 5lbs per day until 30lbs max. Ok to resume light chores, activity as tolerated  Continue to wean down off of smoking  No need to cover incision at this point. Keep open to air, wash daily.  Ok to return to work with lifting restriction of 30 lbs  Start PT  Follow up in another 6 wks with new cervical XR with IVÁN on [place] clinic day    Mary Beth Machuca DIANE-C  [facility] Neurosurgery  O. [phone]

## 2022-06-16 NOTE — PROGRESS NOTES
Neurosurgery clinic note:      A/P:  Ihsan is a 53yo M smoker with hx bipolar 1, chemical dependence, gastritis, gerd, asthma, and tia who is approx 6wks sp C5-6 ACDF by Dr Gage on 05/06/2022. Preop c/o 3mos progressive myelopathy symptoms. He was sent directly to hospital following his surgical consult appt for next-day surgery     Ihsan is greatly improving. Numbness in arms and legs is gone. Little tingling in the hands every once in a while. He has noticed that when he lays on right side postop, his left arm goes numb - this is getting better. For example he accidentally slept on R side last night and didn't experience this. No neck, arm or leg pain, or dysphagia. Wearing collar. He was taking ibuprofen postop, but has been off of it for 3 wks. Still covering incision with gauze. Still smoking a little, but down a lot. 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 He is stronger on exam, sensation is better, walking is better. He is progressing very well. Discussed with Dr Gage who also saw patient for discussion at time of exam. She recommends weaning collar to off, PT, continue to work on complete smoking cessation. We will see Ihsan back in another 6 wks with new XR.      Plan:  1. Follow-up in person in 6wks with new cervical xr with [facility] on [facility] day  2. No need to cover incision  3. Wean collar to off. Advance activity slowly. No lifting over 30lbs.   4. No NSAIDs which we discussed. Tylenol ok  5. Stop smoking      Exam:    General: seated in NAD, appears comfortable. Wearing miami J collar which fits nicely      Strength:  Deltoids: 5/5 right, 5/5 left  Triceps: 5/5 right, 5/5 left  Biceps: 5/5 right, 5/5 left  Handgrips: 4+/5 right, 4+/5 left  Intrinsics: 5-/5 right, 5-/5 left  Extensors: 5/5 right, 5/5 left  Hip flexors: 5/5 right, 5/5 left  Quads: 5/5 right, 5/5 left  Hamstrings: 5/5 right, 5/5 left  Dorsiflexion: 5/5 right, 5/5 left  Plantarflexion 5/5 right, 5/5 left  EHL:  5/5 right, 5/5 left    Sensation is intact to light touch throughout upper and lower extremities bilaterally.    Gait is smooth and coordinated     Incision: covered with a gauze dressing which I removed. CDI with exofin    Imaging:  Personally reviewed the following imaging studies today. Imaging also shown to patient at time of appt. Reviewed by dr claire at time of appt     EXAM: XR CERVICAL SPINE 2/3 VWS  LOCATION: [facility]  DATE/TIME: 6/16/2022 10:34 AM     INDICATION: AP LAT upright in brace; post ACDF  COMPARISON: 05/07/2022  TECHNIQUE: CR Cervical Spine.                                                                      IMPRESSION: C5-C6 ACDF hardware without finding to suggest location.      Unchanged alignment. Unchanged 1 mm anterolisthesis at C4-C5. Vertebral body heights are maintained.      No displaced fracture. No aggressive osseous abnormality. Mild degenerative disc disease at C2-C3, C3-C4, and C6-C7. Normal facets. Prevertebral soft tissues and included lungs are normal.        Mary Beth Machuca FNP-C  [facility] Neurosurgery  O. [phone]

## 2022-07-11 ENCOUNTER — OFFICE VISIT (OUTPATIENT)
Dept: FAMILY MEDICINE | Facility: CLINIC | Age: 52
End: 2022-07-11
Payer: MEDICARE

## 2022-07-11 VITALS
BODY MASS INDEX: 22.82 KG/M2 | RESPIRATION RATE: 14 BRPM | HEART RATE: 105 BPM | TEMPERATURE: 98.8 F | WEIGHT: 173 LBS | DIASTOLIC BLOOD PRESSURE: 91 MMHG | SYSTOLIC BLOOD PRESSURE: 133 MMHG | OXYGEN SATURATION: 97 %

## 2022-07-11 DIAGNOSIS — R05.9 COUGH: ICD-10-CM

## 2022-07-11 DIAGNOSIS — Z20.822 SUSPECTED COVID-19 VIRUS INFECTION: Primary | ICD-10-CM

## 2022-07-11 LAB
DEPRECATED S PYO AG THROAT QL EIA: NEGATIVE
GROUP A STREP BY PCR: NOT DETECTED

## 2022-07-11 PROCEDURE — U0003 INFECTIOUS AGENT DETECTION BY NUCLEIC ACID (DNA OR RNA); SEVERE ACUTE RESPIRATORY SYNDROME CORONAVIRUS 2 (SARS-COV-2) (CORONAVIRUS DISEASE [COVID-19]), AMPLIFIED PROBE TECHNIQUE, MAKING USE OF HIGH THROUGHPUT TECHNOLOGIES AS DESCRIBED BY CMS-2020-01-R: HCPCS | Performed by: PHYSICIAN ASSISTANT

## 2022-07-11 PROCEDURE — 87651 STREP A DNA AMP PROBE: CPT | Performed by: PHYSICIAN ASSISTANT

## 2022-07-11 PROCEDURE — U0005 INFEC AGEN DETEC AMPLI PROBE: HCPCS | Performed by: PHYSICIAN ASSISTANT

## 2022-07-11 PROCEDURE — 99214 OFFICE O/P EST MOD 30 MIN: CPT | Mod: CS | Performed by: PHYSICIAN ASSISTANT

## 2022-07-11 RX ORDER — ONDANSETRON 4 MG/1
4 TABLET, ORALLY DISINTEGRATING ORAL EVERY 8 HOURS PRN
Qty: 15 TABLET | Refills: 0 | Status: SHIPPED | OUTPATIENT
Start: 2022-07-11 | End: 2023-06-15

## 2022-07-11 ASSESSMENT — ENCOUNTER SYMPTOMS
RHINORRHEA: 1
CHILLS: 1
DIARRHEA: 1
LIGHT-HEADEDNESS: 1
VOMITING: 1
COUGH: 1
SHORTNESS OF BREATH: 1
ABDOMINAL PAIN: 1
FEVER: 1
FATIGUE: 1
SORE THROAT: 1
NAUSEA: 1

## 2022-07-11 NOTE — PATIENT INSTRUCTIONS
"Your chest x-ray was negative for any signs of pneumonia.  Your rapid strep test was negative.  We will do a confirmatory strep test that takes between 24 and 48 hours. We only call you about your confirmatory strep test results if they are positive.  You will only be called with your COVID test results if they are positive.  If you test positive for COVID please ask the RN who calls you about how to get scheduled for a \"virtual urgent care visit\" to check your eligibility to receive \"Paxlovid\". If they can't schedule you then call the scheduling line [phone].  Take Tylenol and Ibuprofen according to bottle instructions.  Do not exceed 3000 mg of acetaminophen or 2400mg of ibuprofen from any source in a 24 hour period.  Taking Tylenol and ibuprofen together may be helpful in reducing pain and fever.  Drink plenty of fluids. I have placed an order for an antinausea medicine. You may take it as needed for nausea relief. It dissolves under the tongue and can be taken every 8 hours.   If your COVID test is neg and you are still feeling just as sick after 5 days please follow up.   Seek emergency medical attention if you develop worsening shortness of breath, chest pain, fainting, or severe dehydration.   "

## 2022-07-11 NOTE — PROGRESS NOTES
"Patient presents with:  Fever: Started with fever cough SOB 2 days ago called EMT today was checked over here forCOVID test      Clinical Decision Making: Patient experiencing sick symptoms for the past 2 days.  Symptoms do sound consistent with COVID.  Patient would be considered high risk due to his past medical history of asthma.  No current wheezing on exam.  Chest x-ray is negative for any signs of pneumonia.  Rapid strep test was negative today.  Patient is currently vitally stable.  Recommend taking Zofran as needed for nausea relief.  Suspect mild dehydration.  Patient will take over-the-counter medications for pain control      ICD-10-CM    1. Suspected COVID-19 virus infection  Z20.822    2. Cough  R05.9 Symptomatic; Yes; 7/9/2024 COVID-19 Virus (Coronavirus) by PCR Nose     Streptococcus A Rapid Screen w/Reflex to PCR     XR Chest 2 Views     Group A Streptococcus PCR Throat Swab     ondansetron (ZOFRAN ODT) 4 MG ODT tab       Patient Instructions   1. Your chest x-ray was negative for any signs of pneumonia.  Your rapid strep test was negative.  We will do a confirmatory strep test that takes between 24 and 48 hours. We only call you about your confirmatory strep test results if they are positive.  You will only be called with your COVID test results if they are positive.  2. If you test positive for COVID please ask the RN who calls you about how to get scheduled for a \"virtual urgent care visit\" to check your eligibility to receive \"Paxlovid\". If they can't schedule you then call the scheduling line [phone].  3. Take Tylenol and Ibuprofen according to bottle instructions.  Do not exceed 3000 mg of acetaminophen or 2400mg of ibuprofen from any source in a 24 hour period.  Taking Tylenol and ibuprofen together may be helpful in reducing pain and fever.  4. Drink plenty of fluids. I have placed an order for an antinausea medicine. You may take it as needed for nausea relief. It dissolves under the " tongue and can be taken every 8 hours.   5. If your COVID test is neg and you are still feeling just as sick after 5 days please follow up.   6. Seek emergency medical attention if you develop worsening shortness of breath, chest pain, fainting, or severe dehydration.       HPI:  Ihsan Marcus is a 52 year old male who presents today complaining of sick sxs x 2 days.  Patient has been experiencing cough, fever, body aches, chills, nasal congestion, sore throat, loss of taste and smell, nausea, vomiting, diarrhea, fatigue, and lightheadedness.  This morning patient felt very out of it and was short of breath, so he called EMT.  They took his vitals and said that he was stable and recommended he be evaluated in the clinic.  He has not had any at home COVID test.  He has never had COVID before and is not vaccinated for it.  He is not currently taking any medications for his symptoms.    History obtained from the patient.    Problem List:  2022-05: Cervical spondylosis with myelopathy  2021-02: History of Helicobacter pylori infection  2019-10: Psychophysiological insomnia  2019-10: Anorexia  2019-10: Nocturnal enuresis  2019-09: Polyp of colon  2019-08: Candidiasis of esophagus (H)  2019-08: Gastritis due to Helicobacter species  2019-08: Reflux esophagitis  2019-08: Constipation  2019-08: Left lower quadrant abdominal pain  2019-08: Rectal hemorrhage  2019-03: Urinary urgency  2018-10: Verruca plantaris  2018-06: Pityriasis versicolor  2018-04: Mild persistent asthma  2018-01: Chest pain  2018-01: Transient ischemic attack  2018-01: Hyperlipidemia  2017-12: Pain in eye  2017-01: Delusional disorder (H)  2017-01: Borderline intellectual functioning  2017-01: Drug induced subacute dyskinesia  2017-01: Nondependent cannabis abuse  2017-01: Schizoaffective disorder, bipolar type (H)  2014-05: Rhinitis, allergic  2013-11: Schizophrenia (H)  2012-03: Bipolar I disorder, single manic episode (H)  2012-03: Gastroesophageal  reflux disease without esophagitis  2012-03: Manic episode (H)  2010-02: Positive reaction to tuberculin skin test  2008-05: Drug dependence, in remission (H)      Past Medical History:   Diagnosis Date     Cervical spondylosis with myelopathy 05/05/2022     Mild persistent asthma 04/27/2018       Social History     Tobacco Use     Smoking status: Current Every Day Smoker     Packs/day: 0.50     Years: 23.00     Pack years: 11.50     Types: Cigarettes     Smokeless tobacco: Never Used     Tobacco comment: Slowing down a lot   Substance Use Topics     Alcohol use: No       Review of Systems   Constitutional: Positive for chills, fatigue and fever (subjective).   HENT: Positive for congestion (mild), rhinorrhea and sore throat. Negative for ear pain.    Respiratory: Positive for cough and shortness of breath.    Cardiovascular: Negative for chest pain.   Gastrointestinal: Positive for abdominal pain (Upper), diarrhea, nausea and vomiting.   Neurological: Positive for light-headedness.       Vitals:    07/11/22 1319   BP: (!) 133/91   Pulse: 105   Resp: 14   Temp: 98.8  F (37.1  C)   TempSrc: Oral   SpO2: 97%   Weight: 78.5 kg (173 lb)       Physical Exam  Vitals and nursing note reviewed.   Constitutional:       General: He is not in acute distress.     Appearance: He is ill-appearing (appears tired). He is not toxic-appearing or diaphoretic.   HENT:      Head: Normocephalic and atraumatic.      Right Ear: Tympanic membrane, ear canal and external ear normal.      Left Ear: Tympanic membrane, ear canal and external ear normal.      Mouth/Throat:      Mouth: Mucous membranes are moist.      Pharynx: No oropharyngeal exudate or posterior oropharyngeal erythema.   Eyes:      Conjunctiva/sclera: Conjunctivae normal.   Cardiovascular:      Rate and Rhythm: Normal rate and regular rhythm.      Heart sounds: No murmur heard.  Pulmonary:      Effort: Pulmonary effort is normal. No respiratory distress.      Breath sounds: No  stridor. Rales (left lower and middle fields) present. No wheezing or rhonchi.   Lymphadenopathy:      Cervical: No cervical adenopathy.   Neurological:      Mental Status: He is alert.   Psychiatric:         Mood and Affect: Mood normal.         Behavior: Behavior normal.         Thought Content: Thought content normal.         Judgment: Judgment normal.         Results:  Results for orders placed or performed in visit on 07/11/22   XR Chest 2 Views     Status: None    Narrative    EXAM: XR CHEST 2 VW  LOCATION: [facility]  DATE/TIME: 7/11/2022 2:18 PM    INDICATION: Cough x 2 days. Fevers, chills, shortness of breath. Mild crackles on left side. Hx of asthma.  COMPARISON: None.      Impression    IMPRESSION: Negative chest. Lungs are clear.       Results for orders placed or performed in visit on 07/11/22   Streptococcus A Rapid Screen w/Reflex to PCR     Status: Normal    Specimen: Throat; Swab   Result Value Ref Range    Group A Strep antigen Negative Negative         At the end of the encounter, I discussed results, diagnosis, medications. Discussed red flags for immediate return to clinic/ER, as well as indications for follow up if no improvement. Patient understood and agreed to plan. Patient was stable for discharge.

## 2022-07-12 ENCOUNTER — TELEPHONE (OUTPATIENT)
Dept: NURSING | Facility: CLINIC | Age: 52
End: 2022-07-12

## 2022-07-12 LAB — SARS-COV-2 RNA RESP QL NAA+PROBE: POSITIVE

## 2022-07-12 NOTE — TELEPHONE ENCOUNTER
Patient classified as COVID treatment eligible by Epic high risk algorithm:  Yes    Coronavirus (COVID-19) Notification    Reason for call  Notify of POSITIVE COVID-19 lab result, assess symptoms,  review [facility] recommendations    Lab Result   Lab test for 2019-nCoV rRt-PCR or SARS-COV-2 PCR  Oropharyngeal AND/OR nasopharyngeal swabs were POSITIVE for 2019-nCoV RNA [OR] SARS-COV-2 RNA (COVID-19) RNA     We have been unable to reach patient by phone at this time to notify of their Positive COVID-19 result.    callled, unable to reach pt and unable to leave vm      A Positive COVID-19 letter will be sent via [employer] or the mail.    Sarah Rahman

## 2022-07-13 ENCOUNTER — NURSE TRIAGE (OUTPATIENT)
Dept: NURSING | Facility: CLINIC | Age: 52
End: 2022-07-13

## 2022-07-13 ENCOUNTER — HOSPITAL ENCOUNTER (EMERGENCY)
Facility: CLINIC | Age: 52
Discharge: LEFT BEFORE DISCHARGE | End: 2022-07-13
Payer: MEDICARE

## 2022-07-13 VITALS
HEIGHT: 73 IN | BODY MASS INDEX: 22.93 KG/M2 | DIASTOLIC BLOOD PRESSURE: 79 MMHG | WEIGHT: 173 LBS | SYSTOLIC BLOOD PRESSURE: 108 MMHG | OXYGEN SATURATION: 99 % | TEMPERATURE: 97.7 F | HEART RATE: 91 BPM | RESPIRATION RATE: 16 BRPM

## 2022-07-13 NOTE — TELEPHONE ENCOUNTER
Positive covid.  Ondansetron ordered.  Said it was recommended he talk to someone about Paxlovid.  Stated he is short of breath even at rest.  Hx of asthma  Stated he doesn't not have albuterol.  I recommended he be seen in an ER, now.  He stated understanding and agreement.  He lives in a group home and said a ride to an ER can be arranged for him so he can go now.      Gather patient reported symptoms   Assessment   Current Symptoms at time of phone call, reported by patient: (if no symptoms, document No symptoms] Loss taste and smell  Fever cough  Breathing difficulty   Date of Symptom(s) onset (if applicable) 7/9/22     If at time of call, Patients symptoms hare worsened, the Patient should contact 911 or have someone drive them to Emergency Dept promptly:      If Patient calling 911, inform 911 personal that you have tested positive for the Coronavirus (COVID-19).  Place mask on and await 911 to arrive.    If Emergency Dept, If possible, please have another adult drive you to the Emergency Dept but you need to wear mask when in contact with other people.        Meri Webb RN      Reason for Disposition    MODERATE difficulty breathing (e.g., speaks in phrases, SOB even at rest, pulse 100-120)    Additional Information    Negative: SEVERE difficulty breathing (e.g., struggling for each breath, speaks in single words)    Negative: Difficult to awaken or acting confused (e.g., disoriented, slurred speech)    Negative: Bluish (or gray) lips or face now    Negative: Shock suspected (e.g., cold/pale/clammy skin, too weak to stand, low BP, rapid pulse)    Negative: Sounds like a life-threatening emergency to the triager    Negative: SEVERE or constant chest pain or pressure  (Exception: Mild central chest pain, present only when coughing.)    Protocols used: CORONAVIRUS (COVID-19) DIAGNOSED OR [id number] 1.18.2022    Shira HOLLIDAY RN [facility]

## 2022-07-13 NOTE — ED TRIAGE NOTES
The patient presents to the ED with Covid. He reports symptoms began on Saturday and he tested positive on Monday. The patient's primary care doctor sent him to the ER for a prescription for Paxlovid.     The patient is not vaccinated for covid. The patient reports loss of taste and smell and some mild chills/aches.

## 2022-07-14 ENCOUNTER — TELEPHONE (OUTPATIENT)
Dept: NURSING | Facility: CLINIC | Age: 52
End: 2022-07-14

## 2022-07-14 NOTE — TELEPHONE ENCOUNTER
Here's what I know based on our recent apt:  -pt reported not taking tizanidine so this was discontinued  -olanzapine 15mg at bedtime was listed as historic and I believe pt was taking this (not sure who has prescribed in the past)  -compound W was on med list (sent in 5/17/21 by another provider)    Dr Payan

## 2022-07-14 NOTE — TELEPHONE ENCOUNTER
[facility] is the group home where Ihsan is currently staying.  Paula from [facility] is calling.  Pharmacy needs to know if medications Zanadine 4 mg tid as needed and Olanzapine fell off the med list or if the medications were intended to be discontinued?  Compound W was added on but there is no prescription for this.  Please phone Paula at [phone].  Patient was seen on 7/11/2022.      COVID 19 Nurse Triage Plan/Patient Instructions    Please be aware that novel coronavirus (COVID-19) may be circulating in the community. If you develop symptoms such as fever, cough, or SOB or if you have concerns about the presence of another infection including coronavirus (COVID-19), please contact your health care provider or visit [email or web address].     Disposition/Instructions    Home care recommended. Follow home care protocol based instructions.    Thank you for taking steps to prevent the spread of this virus.  o Limit your contact with others.  o Wear a simple mask to cover your cough.  o Wash your hands well and often.    Resources    [facility]: About COVID-19: [email or web address]    CDC: What to Do If You're Sick: www.cdc.gov/coronavirus/2019-ncov/about/steps-when-sick.html    CDC: Ending Home Isolation: www.cdc.gov/coronavirus/2019-ncov/hcp/disposition-in-home-patients.html     CDC: Caring for Someone: www.cdc.gov/coronavirus/2019-ncov/if-you-are-sick/care-for-someone.html     [facility]: Interim Guidance for Hospital Discharge to Home: www.health.[facility].[place]/diseases/coronavirus/hcp/hospdischarge.pdf    [facility] clinical trials (COVID-19 research studies): clinicalaffairs.[employer].[facility]/[employer]-clinical-trials     Below are the COVID-19 hotlines at the [place] Department of Health ([facility]). Interpreters are available.   o For health questions: Call 497-403-9091 or 1-534.496.7090 (7 a.m. to 7 p.m.)  o For questions about schools and childcare: Call 770-385-9649 or  6-357-600-1574 (7 a.m. to 7 p.m.)

## 2022-07-15 RX ORDER — OLANZAPINE 15 MG/1
15 TABLET ORAL DAILY PRN
COMMUNITY
End: 2022-10-20

## 2022-07-15 NOTE — TELEPHONE ENCOUNTER
[facility], Paula pharmacist     Pharmacy confirmed medication with psychiatrist.    Olanzapine 15mg   Scheduled: 15mg PO at HS  PRN: 15mg every day     salicylic acid- discontinued 05/17/2022, pt no longer using    Clarified tizanidine was discontinued in pt's chart.     Please modify in pt's chart or reconcile at pt's next appt.    Saundra Hill RN

## 2022-08-19 DIAGNOSIS — Z11.1 TUBERCULOSIS SCREENING: Primary | ICD-10-CM

## 2022-08-24 ENCOUNTER — LAB (OUTPATIENT)
Dept: LAB | Facility: CLINIC | Age: 52
End: 2022-08-24
Payer: MEDICARE

## 2022-08-24 ENCOUNTER — ALLIED HEALTH/NURSE VISIT (OUTPATIENT)
Dept: FAMILY MEDICINE | Facility: CLINIC | Age: 52
End: 2022-08-24

## 2022-08-24 DIAGNOSIS — Z11.1 TUBERCULOSIS SCREENING: ICD-10-CM

## 2022-08-24 DIAGNOSIS — Z13.9 SCREENING FOR CONDITION: Primary | ICD-10-CM

## 2022-08-24 PROCEDURE — 99207 PR NO CHARGE NURSE ONLY: CPT

## 2022-08-24 PROCEDURE — 86481 TB AG RESPONSE T-CELL SUSP: CPT

## 2022-08-24 PROCEDURE — 36415 COLL VENOUS BLD VENIPUNCTURE: CPT

## 2022-08-24 NOTE — NURSING NOTE
VISION   Wears glasses: NOT worn for testing  Tool used: Fontenot   Right eye:        10/32 (20/63)  Left eye:          10/40 (20/80)      HEARING     Right Ear:      1000 Hz RESPONSE- on Level:   20 db  (Conditioning sound)   1000 Hz: RESPONSE- on Level:   20 db    2000 Hz: RESPONSE- on Level:   20 db    4000 Hz: RESPONSE- on Level:   20 db     Left Ear:      4000 Hz: RESPONSE- on Level:   20 db    2000 Hz: RESPONSE- on Level:   20 db    1000 Hz: RESPONSE- on Level:   20 db     500 Hz: RESPONSE- on Level:   20 db     Right Ear:    500 Hz: RESPONSE- on Level:   20 db     Hearing Acuity: Pass    Hearing Assessment: normal

## 2022-08-24 NOTE — PROGRESS NOTES
VISION   Wears glasses: NOT worn for testing  Tool used: Fontenot   Right eye:        10/32 (20/63)  Left eye:          10/40 (20/80)  Visual Acuity: RESCREEN:  Pt didn't wear his glasses    .      HEARING     Right Ear:      1000 Hz RESPONSE- on Level:   20 db  (Conditioning sound)   1000 Hz: RESPONSE- on Level:   20 db    2000 Hz: RESPONSE- on Level:   20 db    4000 Hz: RESPONSE- on Level:   20 db     Left Ear:      4000 Hz: RESPONSE- on Level:   20 db    2000 Hz: RESPONSE- on Level:   20 db    1000 Hz: RESPONSE- on Level:   20 db     500 Hz: RESPONSE- on Level:   20 db     Pass hearing test     Hearing Acuity: Pass    Hearing Assessment: normal

## 2022-08-25 ENCOUNTER — OFFICE VISIT (OUTPATIENT)
Dept: NEUROSURGERY | Facility: CLINIC | Age: 52
End: 2022-08-25
Payer: MEDICARE

## 2022-08-25 ENCOUNTER — HOSPITAL ENCOUNTER (OUTPATIENT)
Dept: RADIOLOGY | Facility: HOSPITAL | Age: 52
Discharge: HOME | End: 2022-08-25
Attending: NURSE PRACTITIONER | Admitting: NURSE PRACTITIONER
Payer: MEDICARE

## 2022-08-25 VITALS — SYSTOLIC BLOOD PRESSURE: 129 MMHG | OXYGEN SATURATION: 98 % | HEART RATE: 69 BPM | DIASTOLIC BLOOD PRESSURE: 69 MMHG

## 2022-08-25 DIAGNOSIS — M48.02 SPINAL STENOSIS IN CERVICAL REGION: ICD-10-CM

## 2022-08-25 DIAGNOSIS — Z98.1 S/P CERVICAL SPINAL FUSION: ICD-10-CM

## 2022-08-25 DIAGNOSIS — G95.20 CERVICAL CORD COMPRESSION WITH MYELOPATHY (H): Primary | ICD-10-CM

## 2022-08-25 PROCEDURE — 99024 POSTOP FOLLOW-UP VISIT: CPT | Performed by: NURSE PRACTITIONER

## 2022-08-25 PROCEDURE — 72040 X-RAY EXAM NECK SPINE 2-3 VW: CPT

## 2022-08-25 ASSESSMENT — PAIN SCALES - GENERAL: PAINLEVEL: NO PAIN (0)

## 2022-08-25 NOTE — LETTER
8/25/2022         RE: Ihsan Marcus  [address] [address]        Dear Colleague,    Thank you for referring your patient, Ihsan Marcus, to the [facility]. Please see a copy of my visit note below.    Neurosurgery clinic note:  08/25/22      A/P:  Ihsan is a 53yo M smoker with hx bipolar 1, chemical dependence, gastritis, gerd, asthma, and tia who is approx 6wks sp C5-6 ACDF by Dr Gage on 05/06/2022. Preop c/o 3mos progressive myelopathy symptoms. He was sent directly to hospital following his surgical consult appt for next-day surgery     Ihsan continues to have improvements, notes some intermittent neck stiffness and right arm numbness. Still smoking. XR WNL.     Plan:  1. Continue to slowly increase activity, would cap at 30-40 pounds until six months since still smoking, still with neck stiffness  2. Ice/heat/tylenol for neck stiffness/right arm symptoms  3. Follow up three months with XR, sooner if needed if neck pain worsens  4. Quit smoking    Subjective:   Feeling okay. Symptoms above are not all the time, not overly bothersome. No other complaints. No complaints offered by facility staff.       Exam:  /69   Pulse 69   SpO2 98%     General: seated in NAD, appears comfortable. Wearing miami J collar which fits nicely      Strength:  Deltoids: 5/5 right, 5/5 left  Triceps: 5/5 right, 5/5 left  Biceps: 5/5 right, 5/5 left  Handgrips: 4+/5 right, 4+/5 left  Intrinsics: 5-/5 right, 5-/5 left  Extensors: 5/5 right, 5/5 left  Hip flexors: 5/5 right, 5/5 left  Quads: 5/5 right, 5/5 left  Hamstrings: 5/5 right, 5/5 left  Dorsiflexion: 5/5 right, 5/5 left  Plantarflexion 5/5 right, 5/5 left  EHL: 5/5 right, 5/5 left    Sensation is intact to light touch throughout upper and lower extremities bilaterally.    Gait is smooth and coordinated     Incision: well healed.     Imaging:  Personally reviewed the following imaging studies today. Imaging also shown to  patient at time of appt.                                                                 IMPRESSION: C5-C6 anterior cervical discectomy and fusion. No instrumentation loosening or failure. No plain film evidence of interbody bony bridging at this time. Normal vertebral body heights. Straightened lordosis. Normal coronal alignment. Unchanged   moderate C2-C3 and C3-C4 interbody degeneration. No advanced facet arthropathy.      Margareth Cowan, CNP  [facility] Neurosurgery  O: [phone]             Again, thank you for allowing me to participate in the care of your patient.        Sincerely,        Margareth Cowan, NP

## 2022-08-25 NOTE — PROGRESS NOTES
Neurosurgery clinic note:  08/25/22      A/P:  Ihsan is a 51yo M smoker with hx bipolar 1, chemical dependence, gastritis, gerd, asthma, and tia who is approx 6wks sp C5-6 ACDF by Dr Gage on 05/06/2022. Preop c/o 3mos progressive myelopathy symptoms. He was sent directly to hospital following his surgical consult appt for next-day surgery     Ihsan continues to have improvements, notes some intermittent neck stiffness and right arm numbness. Still smoking. XR WNL.     Plan:  1. Continue to slowly increase activity, would cap at 30-40 pounds until six months since still smoking, still with neck stiffness  2. Ice/heat/tylenol for neck stiffness/right arm symptoms  3. Follow up three months with XR, sooner if needed if neck pain worsens  4. Quit smoking    Subjective:   Feeling okay. Symptoms above are not all the time, not overly bothersome. No other complaints. No complaints offered by facility staff.       Exam:  /69   Pulse 69   SpO2 98%     General: seated in NAD, appears comfortable. Wearing miami J collar which fits nicely      Strength:  Deltoids: 5/5 right, 5/5 left  Triceps: 5/5 right, 5/5 left  Biceps: 5/5 right, 5/5 left  Handgrips: 4+/5 right, 4+/5 left  Intrinsics: 5-/5 right, 5-/5 left  Extensors: 5/5 right, 5/5 left  Hip flexors: 5/5 right, 5/5 left  Quads: 5/5 right, 5/5 left  Hamstrings: 5/5 right, 5/5 left  Dorsiflexion: 5/5 right, 5/5 left  Plantarflexion 5/5 right, 5/5 left  EHL: 5/5 right, 5/5 left    Sensation is intact to light touch throughout upper and lower extremities bilaterally.    Gait is smooth and coordinated     Incision: well healed.     Imaging:  Personally reviewed the following imaging studies today. Imaging also shown to patient at time of appt.                                                                 IMPRESSION: C5-C6 anterior cervical discectomy and fusion. No instrumentation loosening or failure. No plain film evidence of interbody bony bridging at this time.  Normal vertebral body heights. Straightened lordosis. Normal coronal alignment. Unchanged   moderate C2-C3 and C3-C4 interbody degeneration. No advanced facet arthropathy.      Margareth Cowan CNP  [facility] Neurosurgery  O: [phone]

## 2022-08-25 NOTE — PATIENT INSTRUCTIONS
Follow up three months (6 months post-op with repeat x-rays)   Sooner with worsening neck or arm symptoms  Okay for ice/heat for neck and shoulders  Call for worsening neck/arm symptoms  Quit smoking  I would keep lifting restriction to 30-40 pounds until six months post-op.

## 2022-08-25 NOTE — NURSING NOTE
"Ihsan Marcus is a 52 year old male who presents for:  Chief Complaint   Patient presents with     Surgical Followup     6 wk post op  DOS: 5/6/22 Cervical 5 - Cervical 6 anterior cervical decompression and fusion with plate        Initial Vitals:  /69   Pulse 69   SpO2 98%  Estimated body mass index is 22.82 kg/m  as calculated from the following:    Height as of 7/13/22: 6' 1\" (1.854 m).    Weight as of 7/13/22: 173 lb (78.5 kg).. There is no height or weight on file to calculate BSA. BP completed using cuff size: large  No Pain (0)      Alejandro Sousa  "

## 2022-08-26 LAB
GAMMA INTERFERON BACKGROUND BLD IA-ACNC: 0 IU/ML
M TB IFN-G BLD-IMP: NEGATIVE
M TB IFN-G CD4+ BCKGRND COR BLD-ACNC: 10 IU/ML
MITOGEN IGNF BCKGRD COR BLD-ACNC: 0 IU/ML
MITOGEN IGNF BCKGRD COR BLD-ACNC: 0.01 IU/ML
QUANTIFERON MITOGEN: 10 IU/ML
QUANTIFERON NIL TUBE: 0 IU/ML
QUANTIFERON TB1 TUBE: 0 IU/ML
QUANTIFERON TB2 TUBE: 0.01

## 2022-09-08 NOTE — PROGRESS NOTES
Physical Therapy Discharge Summary    Ihsan Marcus has completed the ordered physical therapy evaluation. Treatment recommendations were made, but pt has not returned for any further recommended PT sessions.  Pt was unable to be re-assessed, and present status is unknown.    Please contact me with any questions or concerns.  Thank you for your referral.    Dahlia Bolton  PT, DPT       9/8/2022   [address] [address]   [email or web address]  Voicemail: [phone]

## 2022-10-13 ENCOUNTER — MEDICAL CORRESPONDENCE (OUTPATIENT)
Dept: HEALTH INFORMATION MANAGEMENT | Facility: CLINIC | Age: 52
End: 2022-10-13

## 2022-10-20 ENCOUNTER — OFFICE VISIT (OUTPATIENT)
Dept: NEUROSURGERY | Facility: CLINIC | Age: 52
End: 2022-10-20
Payer: MEDICARE

## 2022-10-20 VITALS
HEIGHT: 73 IN | SYSTOLIC BLOOD PRESSURE: 143 MMHG | BODY MASS INDEX: 22.93 KG/M2 | OXYGEN SATURATION: 100 % | HEART RATE: 65 BPM | DIASTOLIC BLOOD PRESSURE: 89 MMHG | WEIGHT: 173 LBS

## 2022-10-20 DIAGNOSIS — Z98.1 S/P CERVICAL SPINAL FUSION: ICD-10-CM

## 2022-10-20 DIAGNOSIS — G95.20 CERVICAL CORD COMPRESSION WITH MYELOPATHY (H): Primary | ICD-10-CM

## 2022-10-20 PROCEDURE — 99213 OFFICE O/P EST LOW 20 MIN: CPT | Performed by: NURSE PRACTITIONER

## 2022-10-20 NOTE — LETTER
"    10/20/2022         RE: Ihsan Marcus  [address] [address]        Dear Colleague,    Thank you for referring your patient, Ihsan Marcus, to the [facility]. Please see a copy of my visit note below.    Neurosurgery clinic note:    A/P:  Ihsan is a 51yo M smoker with hx bipolar 1, chemical dependence, gastritis, gerd, asthma, and tia who is approx 6wks sp C5-6 ACDF by Dr Gage on 05/06/2022. Preop c/o 3mos progressive myelopathy symptoms. He was sent directly to hospital following his surgical consult appt for next-day surgery     Ihsan returns today with new symptoms on the left x 2-3wks. No injury. Left-sided neck pain into the left arm, left hip and leg into the foot. Weakness in the left arm. His left knee has started buckling. He had these symptoms on the right preop. He is very concerned now that they're happening on the left. He is still smoking 3 packs a day.     Recommend further imaging to check fusion given his new symptoms on the left. Again explained risk of fusion failure if he continues to smoke. He states he will stop smoking if someone can prescribe him a specific brand of patches. Encouraged him to contact his PCP for medication needed to assist with smoking cessation. We will see him back to talk about his imaging once it's done and plan further treatment.    Plan:  1. MRI and CT cervical spine for further evaluation  2. Return to discuss imaging on a day Dr Gage is in clinic  3. Consider referral for injection, conservative treatment   4. Contact PCP, quit smoking      Exam:  BP (!) 143/89   Pulse 65   Ht 6' 1\" (1.854 m)   Wt 173 lb (78.5 kg)   SpO2 100%   BMI 22.82 kg/m      General: seated in NAD, appears comfortable.    Strength:  Deltoids: 5/5 right, 5/5 left  Triceps: 5/5 right, 5/5 left  Biceps: 5/5 right, 4+/5 left  Handgrips: 4+/5 right, 4+/5 left  Intrinsics: 5-/5 right, 4/5 left  Extensors: 5/5 right, 5/5 left  Hip flexors: 5/5 " right, 5/5 left  Quads: 5/5 right, 5/5 left  Hamstrings: 5/5 right, 4/5 left  Dorsiflexion: 5/5 right, 5/5 left  Plantarflexion 5/5 right, 5/5 left  EHL: 5/5 right, 5/5 left    Sensation is intact to light touch throughout upper and lower extremities bilaterally.    Gait is smooth and coordinated     Incision: well healed.     Imaging:                                                             He has no new imaging      Mary Beth CHOP-C  [facility] Neurosurgery  O. [phone]           Again, thank you for allowing me to participate in the care of your patient.        Sincerely,        TIERRA Fuller CNP

## 2022-10-21 DIAGNOSIS — Z71.6 ENCOUNTER FOR SMOKING CESSATION COUNSELING: Primary | ICD-10-CM

## 2022-10-21 RX ORDER — NICOTINE 21 MG/24HR
1 PATCH, TRANSDERMAL 24 HOURS TRANSDERMAL EVERY 24 HOURS
Qty: 28 PATCH | Refills: 3 | Status: SHIPPED | OUTPATIENT
Start: 2022-10-21 | End: 2022-12-19

## 2022-10-25 ENCOUNTER — OFFICE VISIT (OUTPATIENT)
Dept: FAMILY MEDICINE | Facility: CLINIC | Age: 52
End: 2022-10-25
Payer: MEDICARE

## 2022-10-25 VITALS
TEMPERATURE: 97.8 F | HEART RATE: 94 BPM | DIASTOLIC BLOOD PRESSURE: 81 MMHG | HEIGHT: 74 IN | SYSTOLIC BLOOD PRESSURE: 149 MMHG | BODY MASS INDEX: 21.92 KG/M2 | WEIGHT: 170.8 LBS | OXYGEN SATURATION: 96 %

## 2022-10-25 DIAGNOSIS — M47.12 CERVICAL SPONDYLOSIS WITH MYELOPATHY: Primary | ICD-10-CM

## 2022-10-25 PROCEDURE — 99213 OFFICE O/P EST LOW 20 MIN: CPT | Performed by: FAMILY MEDICINE

## 2022-10-25 RX ORDER — ACETAMINOPHEN 325 MG/1
975 TABLET ORAL EVERY 8 HOURS PRN
Qty: 90 TABLET | Refills: 1 | Status: SHIPPED | OUTPATIENT
Start: 2022-10-25 | End: 2023-11-06

## 2022-10-25 NOTE — PROGRESS NOTES
"    (M47.12) Cervical spondylosis with myelopathy  (primary encounter diagnosis)  Comment:   Plan: acetaminophen (TYLENOL) 325 MG tablet            After discussion, patient basically wanted his acetaminophen Rx to be reissued.  He had a previous order for 975 mg every 8 hours as needed.  I reissued his Rx.  He is to follow-up with his specialist.      CHIEF COMPLAINT    Left-sided facial pain and neck pain.      HISTORY    This patient complains of pains on the left side of face along the cheek and ear and some in the left neck.  He is not describing radicular pain in the left arm.  He is not having upper extremity weakness.  He is ambulating.    He has a previous history of a cervical fusion at the C5-6 level.    He tells me that his specialists are reevaluating to see if he needs an additional surgical procedure.    He is not having fevers, facial swelling, redness.      REVIEW OF SYSTEMS    As above.  No breathing problems.  No chest pains.  No nausea or abdominal pain.  No lower extremity weakness.      EXAM  BP (!) 149/81 (BP Location: Right arm, Patient Position: Sitting, Cuff Size: Adult Regular)   Pulse 94   Temp 97.8  F (36.6  C) (Oral)   Ht 1.867 m (6' 1.5\")   Wt 77.5 kg (170 lb 12.8 oz)   SpO2 96%   BMI 22.23 kg/m      HEENT:  No obvious swelling, ear canal appears normal, TMJ nontender.    Neck:  Limited side bending in both directions.  Flexion about normal.    Motor strength in upper extremities WNL.  Gait normal.      "

## 2022-10-27 NOTE — PROGRESS NOTES
"Neurosurgery clinic note:    A/P:  Ihsan is a 51yo M smoker with hx bipolar 1, chemical dependence, gastritis, gerd, asthma, and tia who is approx 6wks sp C5-6 ACDF by Dr Gage on 05/06/2022. Preop c/o 3mos progressive myelopathy symptoms. He was sent directly to hospital following his surgical consult appt for next-day surgery     Ihsan returns today with new symptoms on the left x 2-3wks. No injury. Left-sided neck pain into the left arm, left hip and leg into the foot. Weakness in the left arm. His left knee has started buckling. He had these symptoms on the right preop. He is very concerned now that they're happening on the left. He is still smoking 3 packs a day.     Recommend further imaging to check fusion given his new symptoms on the left. Again explained risk of fusion failure if he continues to smoke. He states he will stop smoking if someone can prescribe him a specific brand of patches. Encouraged him to contact his PCP for medication needed to assist with smoking cessation. We will see him back to talk about his imaging once it's done and plan further treatment.    Plan:  1. MRI and CT cervical spine for further evaluation  2. Return to discuss imaging on a day Dr Gage is in clinic  3. Consider referral for injection, conservative treatment   4. Contact PCP, quit smoking      Exam:  BP (!) 143/89   Pulse 65   Ht 6' 1\" (1.854 m)   Wt 173 lb (78.5 kg)   SpO2 100%   BMI 22.82 kg/m      General: seated in NAD, appears comfortable.    Strength:  Deltoids: 5/5 right, 5/5 left  Triceps: 5/5 right, 5/5 left  Biceps: 5/5 right, 4+/5 left  Handgrips: 4+/5 right, 4+/5 left  Intrinsics: 5-/5 right, 4/5 left  Extensors: 5/5 right, 5/5 left  Hip flexors: 5/5 right, 5/5 left  Quads: 5/5 right, 5/5 left  Hamstrings: 5/5 right, 4/5 left  Dorsiflexion: 5/5 right, 5/5 left  Plantarflexion 5/5 right, 5/5 left  EHL: 5/5 right, 5/5 left    Sensation is intact to light touch throughout upper and lower extremities " bilaterally.    Gait is smooth and coordinated     Incision: well healed.     Imaging:                                                             He has no new imaging      Mary Beth LEWIS-C  [facility]surgery  O. [phone]

## 2022-10-30 ENCOUNTER — MEDICAL CORRESPONDENCE (OUTPATIENT)
Dept: HEALTH INFORMATION MANAGEMENT | Facility: CLINIC | Age: 52
End: 2022-10-30

## 2022-11-21 ENCOUNTER — HEALTH MAINTENANCE LETTER (OUTPATIENT)
Age: 52
End: 2022-11-21

## 2022-11-22 NOTE — PROGRESS NOTES
Physical Therapy Discharge Summary    Ihsan Marcus has completed the ordered physical therapy evaluation. Treatment recommendations were made, but pt has not returned for any further recommended PT sessions.  Pt was unable to be re-assessed, and present status is unknown.    Please contact me with any questions or concerns.  Thank you for your referral.    Dahlia Bolton  PT, DPT       11/22/2022   [address] [address]   [email or web address]  Voicemail: [phone]

## 2022-11-23 ENCOUNTER — HOSPITAL ENCOUNTER (OUTPATIENT)
Dept: CT IMAGING | Facility: HOSPITAL | Age: 52
Discharge: HOME | End: 2022-11-23
Attending: NURSE PRACTITIONER | Admitting: NURSE PRACTITIONER
Payer: MEDICARE

## 2022-11-23 DIAGNOSIS — G95.20 CERVICAL CORD COMPRESSION WITH MYELOPATHY (H): ICD-10-CM

## 2022-11-23 DIAGNOSIS — Z98.1 S/P CERVICAL SPINAL FUSION: ICD-10-CM

## 2022-11-23 PROCEDURE — G1010 CDSM STANSON: HCPCS

## 2022-12-09 DIAGNOSIS — Z71.6 ENCOUNTER FOR SMOKING CESSATION COUNSELING: ICD-10-CM

## 2022-12-09 DIAGNOSIS — R05.9 COUGH: ICD-10-CM

## 2022-12-12 ENCOUNTER — TELEPHONE (OUTPATIENT)
Dept: FAMILY MEDICINE | Facility: CLINIC | Age: 52
End: 2022-12-12

## 2022-12-12 RX ORDER — ONDANSETRON 4 MG/1
4 TABLET, ORALLY DISINTEGRATING ORAL EVERY 8 HOURS PRN
Qty: 15 TABLET | Refills: 0 | OUTPATIENT
Start: 2022-12-12

## 2022-12-12 RX ORDER — NICOTINE 21 MG/24HR
1 PATCH, TRANSDERMAL 24 HOURS TRANSDERMAL EVERY 24 HOURS
Qty: 28 PATCH | Refills: 3 | OUTPATIENT
Start: 2022-12-12

## 2022-12-13 RX ORDER — GUAIFENESIN 200 MG/10ML
200 LIQUID ORAL EVERY 4 HOURS PRN
Qty: 473 ML | Refills: 3 | Status: SHIPPED | OUTPATIENT
Start: 2022-12-13 | End: 2023-06-15

## 2022-12-13 NOTE — TELEPHONE ENCOUNTER
"Routing refill request to provider for review/approval because:  Routing to provider at clinic seen for last Medicare annual visit, as listed PCP has not seen patient in 10 months.      Last Written Prescription Date:  6/10/21  Last Fill Quantity: 473,  # refills: 3   Last office visit provider:  6/14/22     Requested Prescriptions   Pending Prescriptions Disp Refills     ondansetron (ZOFRAN ODT) 4 MG ODT tab 15 tablet 0     Sig: Take 1 tablet (4 mg) by mouth every 8 hours as needed for nausea        Antivertigo/Antiemetic Agents Passed - 12/9/2022  2:20 PM        Passed - Recent (12 mo) or future (30 days) visit within the authorizing provider's specialty     Patient has had an office visit with the authorizing provider or a provider within the authorizing providers department within the previous 12 mos or has a future within next 30 days. See \"Patient Info\" tab in inbasket, or \"Choose Columns\" in Meds & Orders section of the refill encounter.              Passed - Medication is active on med list        Passed - Patient is 18 years of age or older           nicotine (NICODERM CQ) 21 MG/24HR 24 hr patch 28 patch 3     Sig: Place 1 patch onto the skin every 24 hours       There is no refill protocol information for this order        guaiFENesin (ROBITUSSIN) 20 mg/mL liquid 473 mL 3     Sig: Take 10 mLs (200 mg) by mouth every 4 hours as needed for cough       There is no refill protocol information for this order          Charo Guzman RN 12/12/22 11:29 PM  "

## 2022-12-16 DIAGNOSIS — Z71.6 ENCOUNTER FOR SMOKING CESSATION COUNSELING: ICD-10-CM

## 2022-12-19 RX ORDER — NICOTINE 21 MG/24HR
1 PATCH, TRANSDERMAL 24 HOURS TRANSDERMAL EVERY 24 HOURS
Qty: 28 PATCH | Refills: 3 | Status: SHIPPED | OUTPATIENT
Start: 2022-12-19 | End: 2023-05-25

## 2022-12-19 NOTE — TELEPHONE ENCOUNTER
"Routing refill request to provider for review/approval because:  BP    Pending Prescriptions:                       Disp   Refills    nicotine (NICODERM CQ) 21 MG/24HR 24 hr pa*28 pat*3        Sig: Place 1 patch onto the skin every 24 hours    Requested Prescriptions   Pending Prescriptions Disp Refills    nicotine (NICODERM CQ) 21 MG/24HR 24 hr patch 28 patch 3     Sig: Place 1 patch onto the skin every 24 hours       Partial Cholinergic Nicotinic Agonist Agents Failed - 12/16/2022  2:13 PM        Failed - Blood pressure under 140/90 in past 12 months     BP Readings from Last 3 Encounters:   10/25/22 (!) 149/81   10/20/22 (!) 143/89   08/25/22 129/69                 Passed - Recent (12 mo) or future (30 days) visit within the authorizing provider's specialty     Patient has had an office visit with the authorizing provider or a provider within the authorizing providers department within the previous 12 mos or has a future within next 30 days. See \"Patient Info\" tab in inbasket, or \"Choose Columns\" in Meds & Orders section of the refill encounter.              Passed - Medication is active on med list        Passed - Patient is 18 years of age or older           Carolyn Garcia RN on 12/19/2022 at 4:38 PM    "

## 2022-12-30 ENCOUNTER — LAB (OUTPATIENT)
Dept: LAB | Facility: CLINIC | Age: 52
End: 2022-12-30
Payer: MEDICARE

## 2022-12-30 DIAGNOSIS — Z20.822 ENCOUNTER FOR LABORATORY TESTING FOR COVID-19 VIRUS: ICD-10-CM

## 2022-12-30 DIAGNOSIS — Z79.899 HIGH RISK MEDICATION USE: Primary | ICD-10-CM

## 2022-12-30 LAB
ALBUMIN SERPL BCG-MCNC: 4.5 G/DL (ref 3.5–5.2)
ALP SERPL-CCNC: 81 U/L (ref 40–129)
ALT SERPL W P-5'-P-CCNC: 9 U/L (ref 10–50)
ANION GAP SERPL CALCULATED.3IONS-SCNC: 9 MMOL/L (ref 7–15)
AST SERPL W P-5'-P-CCNC: 23 U/L (ref 10–50)
BILIRUB SERPL-MCNC: 0.4 MG/DL
BUN SERPL-MCNC: 10 MG/DL (ref 6–20)
CALCIUM SERPL-MCNC: 9.4 MG/DL (ref 8.6–10)
CHLORIDE SERPL-SCNC: 101 MMOL/L (ref 98–107)
CHOLEST SERPL-MCNC: 165 MG/DL
CREAT SERPL-MCNC: 0.98 MG/DL (ref 0.67–1.17)
DEPRECATED HCO3 PLAS-SCNC: 27 MMOL/L (ref 22–29)
GFR SERPL CREATININE-BSD FRML MDRD: >90 ML/MIN/1.73M2
GLUCOSE SERPL-MCNC: 96 MG/DL (ref 70–99)
HBA1C MFR BLD: 5.7 % (ref 0–5.6)
HDLC SERPL-MCNC: 49 MG/DL
LDLC SERPL CALC-MCNC: 104 MG/DL
NONHDLC SERPL-MCNC: 116 MG/DL
POTASSIUM SERPL-SCNC: 3.3 MMOL/L (ref 3.4–5.3)
PROLACTIN SERPL 3RD IS-MCNC: 8 NG/ML (ref 4–15)
PROT SERPL-MCNC: 7.2 G/DL (ref 6.4–8.3)
SODIUM SERPL-SCNC: 137 MMOL/L (ref 136–145)
TRIGL SERPL-MCNC: 62 MG/DL

## 2022-12-30 PROCEDURE — 84146 ASSAY OF PROLACTIN: CPT

## 2022-12-30 PROCEDURE — 36415 COLL VENOUS BLD VENIPUNCTURE: CPT

## 2022-12-30 PROCEDURE — 83036 HEMOGLOBIN GLYCOSYLATED A1C: CPT

## 2022-12-30 PROCEDURE — 80061 LIPID PANEL: CPT

## 2022-12-30 PROCEDURE — 80053 COMPREHEN METABOLIC PANEL: CPT

## 2022-12-30 PROCEDURE — U0003 INFECTIOUS AGENT DETECTION BY NUCLEIC ACID (DNA OR RNA); SEVERE ACUTE RESPIRATORY SYNDROME CORONAVIRUS 2 (SARS-COV-2) (CORONAVIRUS DISEASE [COVID-19]), AMPLIFIED PROBE TECHNIQUE, MAKING USE OF HIGH THROUGHPUT TECHNOLOGIES AS DESCRIBED BY CMS-2020-01-R: HCPCS

## 2022-12-30 PROCEDURE — U0005 INFEC AGEN DETEC AMPLI PROBE: HCPCS

## 2022-12-31 LAB — SARS-COV-2 RNA RESP QL NAA+PROBE: NEGATIVE

## 2023-02-03 ENCOUNTER — TELEPHONE (OUTPATIENT)
Dept: FAMILY MEDICINE | Facility: CLINIC | Age: 53
End: 2023-02-03
Payer: MEDICAID

## 2023-02-03 DIAGNOSIS — E78.2 MIXED HYPERLIPIDEMIA: ICD-10-CM

## 2023-02-06 ENCOUNTER — TELEPHONE (OUTPATIENT)
Dept: FAMILY MEDICINE | Facility: CLINIC | Age: 53
End: 2023-02-06
Payer: MEDICAID

## 2023-02-07 RX ORDER — ROSUVASTATIN CALCIUM 10 MG/1
10 TABLET, COATED ORAL DAILY
Qty: 90 TABLET | Refills: 0 | Status: SHIPPED | OUTPATIENT
Start: 2023-02-07 | End: 2023-05-01

## 2023-02-07 NOTE — TELEPHONE ENCOUNTER
Please reach out to patient/ new group home. He will need to come in for an appointment soon or establish care closer to where he is now living. He is due for labs and a visit.   TIERRA Dickens CNP

## 2023-02-21 DIAGNOSIS — Z72.0 TOBACCO ABUSE: ICD-10-CM

## 2023-02-23 NOTE — TELEPHONE ENCOUNTER
"Routing refill request to provider for review/approval because:  Last signed by another provider.     Requested Prescriptions   Pending Prescriptions Disp Refills     nicotine polacrilex (NICORETTE) 4 MG gum 100 each 0     Sig: Place 1 each (4 mg) inside cheek every hour as needed for other (nicotine withdrawal symptoms)       Partial Cholinergic Nicotinic Agonist Agents Failed - 2/21/2023  2:12 PM        Failed - Blood pressure under 140/90 in past 12 months     BP Readings from Last 3 Encounters:   10/25/22 (!) 149/81   10/20/22 (!) 143/89   08/25/22 129/69                 Failed - Recent (12 mo) or future (30 days) visit within the authorizing provider's specialty     Patient has had an office visit with the authorizing provider or a provider within the authorizing providers department within the previous 12 mos or has a future within next 30 days. See \"Patient Info\" tab in inbasket, or \"Choose Columns\" in Meds & Orders section of the refill encounter.              Passed - Medication is active on med list        Passed - Patient is 18 years of age or older                   "

## 2023-03-31 DIAGNOSIS — J30.1 SEASONAL ALLERGIC RHINITIS DUE TO POLLEN: ICD-10-CM

## 2023-03-31 RX ORDER — CETIRIZINE HYDROCHLORIDE 10 MG/1
TABLET ORAL
Qty: 28 TABLET | Refills: 0 | Status: SHIPPED | OUTPATIENT
Start: 2023-03-31 | End: 2023-04-28

## 2023-03-31 NOTE — TELEPHONE ENCOUNTER
"Refill routed to clinic support pool due to no current PCP.    Last Written Prescription Date:  3/13/2023  Last Fill Quantity: 30,  # refills: 0   Last office visit provider:  6/14/2022     Requested Prescriptions   Pending Prescriptions Disp Refills     cetirizine (ZYRTEC) 10 MG tablet [Pharmacy Med Name: CETIRIZINE 10MG TABS 10 Tablet] 28 tablet 0     Sig: TAKE ONE TABLET BY MOUTH EVERY DAY       Antihistamines Protocol Passed - 3/31/2023 11:30 AM        Passed - Patient is 3-64 years of age     Apply weight-based dosing for peds patients age 3 - 12 years of age.    Forward request to provider for patients under the age of 3 or over the age of 64.          Passed - Recent (12 mo) or future (30 days) visit within the authorizing provider's specialty     Patient has had an office visit with the authorizing provider or a provider within the authorizing providers department within the previous 12 mos or has a future within next 30 days. See \"Patient Info\" tab in inbasket, or \"Choose Columns\" in Meds & Orders section of the refill encounter.              Passed - Medication is active on med list             Cindy Justin RN 03/31/23 12:20 PM  "

## 2023-04-27 ENCOUNTER — HOSPITAL ENCOUNTER (OUTPATIENT)
Dept: RADIOLOGY | Facility: CLINIC | Age: 53
Discharge: HOME | End: 2023-04-27
Attending: NURSE PRACTITIONER | Admitting: NURSE PRACTITIONER
Payer: COMMERCIAL

## 2023-04-27 ENCOUNTER — OFFICE VISIT (OUTPATIENT)
Dept: FAMILY MEDICINE | Facility: CLINIC | Age: 53
End: 2023-04-27
Payer: COMMERCIAL

## 2023-04-27 VITALS
HEIGHT: 74 IN | HEART RATE: 67 BPM | RESPIRATION RATE: 16 BRPM | BODY MASS INDEX: 22.33 KG/M2 | TEMPERATURE: 96.3 F | OXYGEN SATURATION: 97 % | SYSTOLIC BLOOD PRESSURE: 118 MMHG | WEIGHT: 174 LBS | DIASTOLIC BLOOD PRESSURE: 68 MMHG

## 2023-04-27 DIAGNOSIS — R41.83 BORDERLINE INTELLECTUAL FUNCTIONING: ICD-10-CM

## 2023-04-27 DIAGNOSIS — J30.1 SEASONAL ALLERGIC RHINITIS DUE TO POLLEN: ICD-10-CM

## 2023-04-27 DIAGNOSIS — F30.9 BIPOLAR I DISORDER, SINGLE MANIC EPISODE (H): ICD-10-CM

## 2023-04-27 DIAGNOSIS — S69.91XA INJURY OF RIGHT THUMB, INITIAL ENCOUNTER: Primary | ICD-10-CM

## 2023-04-27 DIAGNOSIS — F22 DELUSIONAL DISORDER (H): ICD-10-CM

## 2023-04-27 DIAGNOSIS — S69.91XA INJURY OF RIGHT THUMB, INITIAL ENCOUNTER: ICD-10-CM

## 2023-04-27 PROCEDURE — 99214 OFFICE O/P EST MOD 30 MIN: CPT | Performed by: NURSE PRACTITIONER

## 2023-04-27 PROCEDURE — 73140 X-RAY EXAM OF FINGER(S): CPT | Mod: RT

## 2023-04-27 ASSESSMENT — ASTHMA QUESTIONNAIRES
ACT_TOTALSCORE: 24
QUESTION_3 LAST FOUR WEEKS HOW OFTEN DID YOUR ASTHMA SYMPTOMS (WHEEZING, COUGHING, SHORTNESS OF BREATH, CHEST TIGHTNESS OR PAIN) WAKE YOU UP AT NIGHT OR EARLIER THAN USUAL IN THE MORNING: NOT AT ALL
QUESTION_2 LAST FOUR WEEKS HOW OFTEN HAVE YOU HAD SHORTNESS OF BREATH: NOT AT ALL
QUESTION_5 LAST FOUR WEEKS HOW WOULD YOU RATE YOUR ASTHMA CONTROL: WELL CONTROLLED
QUESTION_4 LAST FOUR WEEKS HOW OFTEN HAVE YOU USED YOUR RESCUE INHALER OR NEBULIZER MEDICATION (SUCH AS ALBUTEROL): NOT AT ALL
QUESTION_1 LAST FOUR WEEKS HOW MUCH OF THE TIME DID YOUR ASTHMA KEEP YOU FROM GETTING AS MUCH DONE AT WORK, SCHOOL OR AT HOME: NONE OF THE TIME
ACT_TOTALSCORE: 24

## 2023-04-27 ASSESSMENT — PAIN SCALES - GENERAL: PAINLEVEL: NO PAIN (0)

## 2023-04-27 NOTE — PROGRESS NOTES
Assessment & Plan     Injury of right thumb, initial encounter  Recommend xray of the thumb. This will be completed today. Plan on ortho consult depending on results.   - XR Finger Right G/E 2 Views; Future    Bipolar I disorder, single manic episode (H)  Borderline intellectual functioning  Delusional disorder (H)  Continues to live in a group home, generally doing well and is stable. Forms and medications updated for patient today.     Desires a closer PCP as he has moved from [place], [place] to [place]. Discussed scheduling at a more convenient location closer to home for follow ups.                    aMyda Albarran, TIERRA CNP  [facility]    Funmilayo Champagne is a 53 year old, presenting for the following health issues:  Health Maintenance (Advised patient of . Will get flu and pneumonia shots today.) and RECHECK (Needs a signature of a standing order)        4/27/2023     9:50 AM   Additional Questions   Accompanied by house supervisor, Tima     History of Present Illness       Reason for visit:  Signing standing order and check of right tumb    He eats 0-1 servings of fruits and vegetables daily.He consumes 2 sweetened beverage(s) daily.He exercises with enough effort to increase his heart rate 10 to 19 minutes per day.  He exercises with enough effort to increase his heart rate 4 days per week.   He is taking medications regularly.       Patient states that he can feel something moving around in his stomach and thinks he has a tapeworm. He has felt it for 2 1/2 months, and it is causing pain in his stomach in the abdomen. (this is a delusion he has had for a number of years) symptoms are the same as they have been previously. He denies any other changes or symptoms that are present. He reports BMs have been normal. Denies any diarrhea. At times has constipation.     Patient also smashed his right thumb about 3 weeks ago, and has a large blister on the side of the thumb.  "Photos below. No drainage has been noted. It is painful around the thumb. Where the skin is stretching.     He continues to reside in a group home and moved from [place] to [place].   Medications continue to be stable. His  is with today. He is holding a baby doll.     Review of Systems   Constitutional, HEENT, cardiovascular, pulmonary, gi and gu systems are negative, except as otherwise noted.      Objective    /68 (BP Location: Right arm, Patient Position: Sitting, Cuff Size: Adult Regular)   Pulse 67   Temp (!) 96.3  F (35.7  C) (Tympanic)   Resp 16   Ht 1.867 m (6' 1.5\")   Wt 78.9 kg (174 lb)   SpO2 97%   BMI 22.65 kg/m    Body mass index is 22.65 kg/m .  Physical Exam   GENERAL: healthy, alert and no distress  RESP: lungs clear to auscultation - no rales, rhonchi or wheezes  CV: regular rate and rhythm, normal S1 S2, no S3 or S4, no murmur, click or rub, no peripheral edema and peripheral pulses strong  ABDOMEN: soft, nontender, no hepatosplenomegaly, no masses and bowel sounds normal  SKIN: Hematoma on the lateral aspect of the right thumb, soft to palpation. Mild pain to the MIP joint on palpation. See photo below.   PSYCH: mentation appears normal, affect normal/bright                      "

## 2023-04-28 RX ORDER — CETIRIZINE HYDROCHLORIDE 10 MG/1
TABLET ORAL
Qty: 90 TABLET | Refills: 3 | Status: SHIPPED | OUTPATIENT
Start: 2023-04-28 | End: 2023-08-17

## 2023-04-28 NOTE — RESULT ENCOUNTER NOTE
Called and spoke with patient with below information. Patient understood and had no further questions.    Marycarmen Wallace, CMA

## 2023-04-28 NOTE — TELEPHONE ENCOUNTER
"Last Written Prescription Date:  3/31/2023  Last Fill Quantity: 28,  # refills: 0   Last office visit provider:  4/27/2023     Requested Prescriptions   Pending Prescriptions Disp Refills     cetirizine (ZYRTEC) 10 MG tablet [Pharmacy Med Name: CETIRIZINE 10MG TABS 10 Tablet] 28 tablet 0     Sig: TAKE ONE TABLET BY MOUTH EVERY DAY       Antihistamines Protocol Passed - 4/28/2023 10:18 AM        Passed - Patient is 3-64 years of age     Apply weight-based dosing for peds patients age 3 - 12 years of age.    Forward request to provider for patients under the age of 3 or over the age of 64.          Passed - Recent (12 mo) or future (30 days) visit within the authorizing provider's specialty     Patient has had an office visit with the authorizing provider or a provider within the authorizing providers department within the previous 12 mos or has a future within next 30 days. See \"Patient Info\" tab in inbasket, or \"Choose Columns\" in Meds & Orders section of the refill encounter.              Passed - Medication is active on med list             ILIA RUBIN RN 04/28/23 10:21 AM  "

## 2023-05-01 DIAGNOSIS — K59.00 CONSTIPATION, UNSPECIFIED CONSTIPATION TYPE: ICD-10-CM

## 2023-05-01 DIAGNOSIS — M47.12 CERVICAL SPONDYLOSIS WITH MYELOPATHY: ICD-10-CM

## 2023-05-01 DIAGNOSIS — E78.2 MIXED HYPERLIPIDEMIA: ICD-10-CM

## 2023-05-01 RX ORDER — MULTIVITAMIN WITH FOLIC ACID 400 MCG
1 TABLET ORAL DAILY
Qty: 31 TABLET | Refills: 11 | Status: SHIPPED | OUTPATIENT
Start: 2023-05-01 | End: 2023-05-25

## 2023-05-01 RX ORDER — CALCIUM POLYCARBOPHIL 625 MG 625 MG/1
2 TABLET ORAL DAILY
Qty: 60 TABLET | Refills: 11 | Status: SHIPPED | OUTPATIENT
Start: 2023-05-01 | End: 2023-06-01

## 2023-05-01 RX ORDER — ROSUVASTATIN CALCIUM 10 MG/1
10 TABLET, COATED ORAL DAILY
Qty: 30 TABLET | Refills: 11 | Status: SHIPPED | OUTPATIENT
Start: 2023-05-01 | End: 2023-06-01

## 2023-05-25 ENCOUNTER — VIRTUAL VISIT (OUTPATIENT)
Dept: FAMILY MEDICINE | Facility: CLINIC | Age: 53
End: 2023-05-25
Payer: COMMERCIAL

## 2023-05-25 ENCOUNTER — DOCUMENTATION ONLY (OUTPATIENT)
Dept: VASCULAR SURGERY | Facility: CLINIC | Age: 53
End: 2023-05-25

## 2023-05-25 DIAGNOSIS — G45.9 TRANSIENT ISCHEMIC ATTACK: ICD-10-CM

## 2023-05-25 DIAGNOSIS — Z72.0 TOBACCO ABUSE: ICD-10-CM

## 2023-05-25 DIAGNOSIS — M47.12 CERVICAL SPONDYLOSIS WITH MYELOPATHY: ICD-10-CM

## 2023-05-25 DIAGNOSIS — S61.001D OPEN WOUND OF RIGHT THUMB, SUBSEQUENT ENCOUNTER: Primary | ICD-10-CM

## 2023-05-25 PROCEDURE — 99214 OFFICE O/P EST MOD 30 MIN: CPT | Mod: VID | Performed by: NURSE PRACTITIONER

## 2023-05-25 RX ORDER — NICOTINE 21 MG/24HR
1 PATCH, TRANSDERMAL 24 HOURS TRANSDERMAL DAILY PRN
Qty: 30 PATCH | Refills: 0
Start: 2023-05-25 | End: 2023-05-25

## 2023-05-25 RX ORDER — CEPHALEXIN 500 MG/1
500 CAPSULE ORAL 3 TIMES DAILY
Qty: 21 CAPSULE | Refills: 0 | Status: SHIPPED | OUTPATIENT
Start: 2023-05-25 | End: 2023-06-01

## 2023-05-25 RX ORDER — MULTIVITAMIN WITH FOLIC ACID 400 MCG
1 TABLET ORAL DAILY PRN
Qty: 31 TABLET | Refills: 11 | Status: SHIPPED | OUTPATIENT
Start: 2023-05-25 | End: 2023-06-15

## 2023-05-25 RX ORDER — MULTIVITAMIN WITH FOLIC ACID 400 MCG
1 TABLET ORAL DAILY PRN
Qty: 31 TABLET | Refills: 11
Start: 2023-05-25 | End: 2023-05-25

## 2023-05-25 RX ORDER — NICOTINE 21 MG/24HR
1 PATCH, TRANSDERMAL 24 HOURS TRANSDERMAL DAILY PRN
Qty: 30 PATCH | Refills: 0 | Status: SHIPPED | OUTPATIENT
Start: 2023-05-25 | End: 2023-07-06 | Stop reason: ALTCHOICE

## 2023-05-25 NOTE — PATIENT INSTRUCTIONS
Further treatment of the thumb wound recommended. Wound care referral placed. I would like to get this imaged again as it sounds like it is worsened.     Start antibiotic.     Xray ordered.   Wound care referral placed.     Medications changed to reflect how he likes to take the medications.

## 2023-05-25 NOTE — PROGRESS NOTES
Vascular Referral Intake    Referred by: Mayda Albarran APRN CNP  for Open wound of right thumb    Specialty: Wound Clinic    Specific Provider if Necessary:  ANUEL Lundberg or MD Avery Do    Visit Type: Wound Clinic    Time Frame: Next Available    Testing/Imaging Needed Before Consult: None    Appt Note: (to be pasted into appt comments, also add where additional information is located, ie, outside images pushed to PACS, in Epic, sent to [facility], etc)  CONSULT. Right thumb wound, slammed in door. Keflex started 5/25. Mayda Albarran APRN CNP referring.

## 2023-05-25 NOTE — PROGRESS NOTES
Ihsan is a 53 year old who is being evaluated via a billable video visit.      How would you like to obtain your AVS? Mail a copy   If the video visit is dropped, the invitation should be resent by: Send to e-mail at: [email or web address]  Will anyone else be joining your video visit? No          Assessment & Plan     Open wound of right thumb, subsequent encounter  Ongoing wound present. Recommend re-imaging as it is persistent. Sounds infected, cephalexin sent to be started. Wound care referral placed.   - cephALEXin (KEFLEX) 500 MG capsule; Take 1 capsule (500 mg) by mouth 3 times daily for 7 days  - Wound Care Referral; Future  - XR Finger Right G/E 2 Views; Future    Cervical spondylosis with myelopathy  Post surgery was started on multivitamin. Okay to take this as needed instead of daily.   - Multiple Vitamin (TAB-A-KUSHAL) TABS; Take 1 tablet by mouth daily as needed (when patient agrees to take)    Tobacco abuse  Changed prescription to reflect as needed for patient to take when he wants this instead of daily at the same time.   - nicotine (NICODERM CQ) 21 MG/24HR 24 hr patch; Place 1 patch onto the skin daily as needed for smoking cessation             Nicotine/Tobacco Cessation:  He reports that he has been smoking cigarettes. He has a 11.50 pack-year smoking history. He has never used smokeless tobacco.  Nicotine/Tobacco Cessation Plan:   Information offered: Patient not interested at this time--Currently using nicotine patch as needed.         TIERRA Dickens CNP  [facility]   Ihsan is a 53 year old, presenting for the following health issues:  Recheck Medication and Health Maintenance (Advised patient of .)        5/25/2023    12:56 PM   Additional Questions   Roomed by Diana GAINES CMA   Accompanied by self     HPI     Patient's caregiver is here to discuss him medications. She states that Ihsan won't take his multivitamin because it hurts his stomach. Also,  she would like his nicotine patch to be prescribed to PRN as Ihsan refuses it often.    He continues to have a wound of the right thumb. This has been persistent since he slammed the thumb in a door. He reports that the blister popped. It now is more painful and tingly. He has not taken medication for this yet. He reports that it has not healed at all.     No other specific concerns.       Review of Systems   Constitutional, HEENT, cardiovascular, pulmonary, gi and gu systems are negative, except as otherwise noted.      Objective           Vitals:  No vitals were obtained today due to virtual visit.    Physical Exam   GENERAL: Healthy, alert and no distress  EYES: Eyes grossly normal to inspection.  No discharge or erythema, or obvious scleral/conjunctival abnormalities.  RESP: No audible wheeze, cough, or visible cyanosis.  No visible retractions or increased work of breathing.    SKIN: Visible skin clear. No significant rash, abnormal pigmentation or lesions.  NEURO: Cranial nerves grossly intact.  Mentation and speech appropriate for age.  PSYCH: Mentation appears normal, affect normal/bright, judgement and insight intact, normal speech and appearance well-groomed.                Video-Visit Details    Type of service:  Video Visit   Video Start Time: 3:02pm  Video End Time:3:20 PM    Originating Location (pt. Location): Home    Distant Location (provider location):  On-site  Platform used for Video Visit: Shashi

## 2023-05-26 RX ORDER — ASPIRIN 81 MG
TABLET,CHEWABLE ORAL
Qty: 28 TABLET | Refills: 2 | Status: SHIPPED | OUTPATIENT
Start: 2023-05-26 | End: 2023-06-01

## 2023-06-01 ENCOUNTER — VIRTUAL VISIT (OUTPATIENT)
Dept: FAMILY MEDICINE | Facility: CLINIC | Age: 53
End: 2023-06-01
Payer: COMMERCIAL

## 2023-06-01 DIAGNOSIS — E78.2 MIXED HYPERLIPIDEMIA: ICD-10-CM

## 2023-06-01 DIAGNOSIS — K59.00 CONSTIPATION, UNSPECIFIED CONSTIPATION TYPE: ICD-10-CM

## 2023-06-01 DIAGNOSIS — G45.9 TRANSIENT ISCHEMIC ATTACK: ICD-10-CM

## 2023-06-01 PROCEDURE — 99214 OFFICE O/P EST MOD 30 MIN: CPT | Mod: VID | Performed by: NURSE PRACTITIONER

## 2023-06-01 RX ORDER — CALCIUM POLYCARBOPHIL 625 MG 625 MG/1
2 TABLET ORAL PRN
Qty: 60 TABLET | Refills: 11 | Status: SHIPPED | OUTPATIENT
Start: 2023-06-01 | End: 2024-08-12

## 2023-06-01 RX ORDER — ROSUVASTATIN CALCIUM 10 MG/1
TABLET, COATED ORAL
Qty: 30 TABLET | Refills: 11 | Status: SHIPPED | OUTPATIENT
Start: 2023-06-01 | End: 2023-08-15

## 2023-06-01 RX ORDER — ASPIRIN 81 MG/1
TABLET, CHEWABLE ORAL
Qty: 28 TABLET | Refills: 11 | Status: SHIPPED | OUTPATIENT
Start: 2023-06-01 | End: 2023-06-15

## 2023-06-01 NOTE — PROGRESS NOTES
Ihsan is a 53 year old who is being evaluated via a billable video visit.      How would you like to obtain your AVS? Mail a copy - Attn:Celestina Pulliamvandana  If the video visit is dropped, the invitation should be resent by: Send to e-mail at: [email or web address]  Will anyone else be joining your video visit? No          Assessment & Plan     Constipation, unspecified constipation type  Adjusted how the medication is prescribed. He can take this as needed.   - calcium polycarbophil (FIBERCON) 625 MG tablet; Take 2 tablets (1,250 mg) by mouth as needed for constipation    Transient ischemic attack  encouraged to keep the medication as prescribed. Adjusted dosing to every other day as he fears this is causing too many side effects.   - aspirin (ASPIRIN LOW DOSE) 81 MG chewable tablet; Take 1 tablet every other day in the evening    Mixed hyperlipidemia  Same as above. Adjusted medication to every other day.   - rosuvastatin (CRESTOR) 10 MG tablet; Take 1 tab every other day in the evening.            TIERRA Dickens [facility]   Ihsan is a 53 year old, presenting for the following health issues:  Recheck Medication (Change AM meds to PM)        5/25/2023    12:56 PM   Additional Questions   Roomed by Diana GAINES CMA   Accompanied by self     History of Present Illness       Reason for visit:  Medication follow up    He eats 0-1 servings of fruits and vegetables daily.He consumes 2 sweetened beverage(s) daily.He exercises with enough effort to increase his heart rate 10 to 19 minutes per day.  He exercises with enough effort to increase his heart rate 4 days per week.   He is taking medications regularly.       Patient's caregiver states that Ihsan would like to have him medications changed to be taken at 8pm, daily. Aspirin, Fibercon, Cetirizine, Rosuvastatin so that he is taking all 6 in the evening      He is wanting to stop taking medications.     The 10-year ASCVD  risk score (Mart CONTRERAS, et al., 2019) is: 8.2%    Values used to calculate the score:      Age: 53 years      Sex: Male      Is Non- : Yes      Diabetic: No      Tobacco smoker: Yes      Systolic Blood Pressure: 118 mmHg      Is BP treated: No      HDL Cholesterol: 49 mg/dL      Total Cholesterol: 165 mg/dL      Review of Systems   Constitutional, HEENT, cardiovascular, pulmonary, gi and gu systems are negative, except as otherwise noted.      Objective           Vitals:  No vitals were obtained today due to virtual visit.    Physical Exam   GENERAL: Healthy, alert and no distress  EYES: Eyes grossly normal to inspection.  No discharge or erythema, or obvious scleral/conjunctival abnormalities.  RESP: No audible wheeze, cough, or visible cyanosis.  No visible retractions or increased work of breathing.    SKIN: Visible skin clear. No significant rash, abnormal pigmentation or lesions.  NEURO: Cranial nerves grossly intact.  Mentation and speech appropriate for age.  PSYCH: Mentation appears normal, affect normal/bright, judgement and insight intact, normal speech and appearance well-groomed.          Video-Visit Details    Type of service:  Video Visit   Video Start Time: 3:00 PM  Video End Time:3:20 PM    Originating Location (pt. Location): Home    Distant Location (provider location):  On-site  Platform used for Video Visit: Shashi

## 2023-06-15 ENCOUNTER — OFFICE VISIT (OUTPATIENT)
Dept: FAMILY MEDICINE | Facility: CLINIC | Age: 53
End: 2023-06-15
Payer: COMMERCIAL

## 2023-06-15 VITALS
BODY MASS INDEX: 21.17 KG/M2 | HEART RATE: 71 BPM | RESPIRATION RATE: 16 BRPM | SYSTOLIC BLOOD PRESSURE: 130 MMHG | DIASTOLIC BLOOD PRESSURE: 78 MMHG | WEIGHT: 165 LBS | OXYGEN SATURATION: 96 % | HEIGHT: 74 IN | TEMPERATURE: 96.9 F

## 2023-06-15 DIAGNOSIS — G45.9 TRANSIENT ISCHEMIC ATTACK: ICD-10-CM

## 2023-06-15 DIAGNOSIS — Z00.00 ROUTINE GENERAL MEDICAL EXAMINATION AT A HEALTH CARE FACILITY: Primary | ICD-10-CM

## 2023-06-15 DIAGNOSIS — M47.12 CERVICAL SPONDYLOSIS WITH MYELOPATHY: ICD-10-CM

## 2023-06-15 DIAGNOSIS — K21.9 GASTROESOPHAGEAL REFLUX DISEASE WITHOUT ESOPHAGITIS: ICD-10-CM

## 2023-06-15 DIAGNOSIS — T78.00XA ALLERGY WITH ANAPHYLAXIS DUE TO FOOD: ICD-10-CM

## 2023-06-15 DIAGNOSIS — R05.9 COUGH, UNSPECIFIED TYPE: ICD-10-CM

## 2023-06-15 DIAGNOSIS — K59.00 CONSTIPATION, UNSPECIFIED CONSTIPATION TYPE: ICD-10-CM

## 2023-06-15 PROCEDURE — 99396 PREV VISIT EST AGE 40-64: CPT | Mod: 25 | Performed by: NURSE PRACTITIONER

## 2023-06-15 PROCEDURE — 99214 OFFICE O/P EST MOD 30 MIN: CPT | Mod: 25 | Performed by: NURSE PRACTITIONER

## 2023-06-15 RX ORDER — ASPIRIN 81 MG/1
TABLET, CHEWABLE ORAL
Qty: 28 TABLET | Refills: 11 | Status: SHIPPED | OUTPATIENT
Start: 2023-06-15 | End: 2023-08-15

## 2023-06-15 RX ORDER — OLANZAPINE 5 MG/1
5 TABLET ORAL DAILY PRN
COMMUNITY

## 2023-06-15 RX ORDER — CEPHALEXIN 500 MG/1
500 CAPSULE ORAL 2 TIMES DAILY
COMMUNITY
End: 2023-06-15

## 2023-06-15 RX ORDER — EPINEPHRINE 0.3 MG/.3ML
INJECTION SUBCUTANEOUS
Qty: 2 EACH | Refills: 1 | Status: SHIPPED | OUTPATIENT
Start: 2023-06-15 | End: 2024-08-21

## 2023-06-15 RX ORDER — GUAIFENESIN 200 MG/10ML
200 LIQUID ORAL EVERY 4 HOURS PRN
Qty: 473 ML | Refills: 3 | Status: SHIPPED | OUTPATIENT
Start: 2023-06-15

## 2023-06-15 ASSESSMENT — ENCOUNTER SYMPTOMS
EYE PAIN: 1
PARESTHESIAS: 0
HEARTBURN: 0
COUGH: 1
HEMATOCHEZIA: 0
NAUSEA: 0
NERVOUS/ANXIOUS: 0
CHILLS: 0
ABDOMINAL PAIN: 0
DIARRHEA: 0
DYSURIA: 0
FREQUENCY: 0
HEMATURIA: 0
SORE THROAT: 0
WEAKNESS: 0
FEVER: 0
DIZZINESS: 0
JOINT SWELLING: 0
SHORTNESS OF BREATH: 0
PALPITATIONS: 0
HEADACHES: 0
CONSTIPATION: 1
MYALGIAS: 0
ARTHRALGIAS: 0

## 2023-06-15 ASSESSMENT — PAIN SCALES - GENERAL: PAINLEVEL: NO PAIN (0)

## 2023-06-15 ASSESSMENT — ACTIVITIES OF DAILY LIVING (ADL): CURRENT_FUNCTION: NO ASSISTANCE NEEDED

## 2023-06-15 NOTE — PROGRESS NOTES
"SUBJECTIVE:   CC: Ihsan is an 53 year old who presents for preventative health visit.       6/15/2023    10:44 AM   Additional Questions   Roomed by Diana GAINES CMA   Accompanied by careworker     Healthy Habits:     In general, how would you rate your overall health?  Good    Frequency of exercise:  2-3 days/week    Duration of exercise:  15-30 minutes    Do you usually eat at least 4 servings of fruit and vegetables a day, include whole grains    & fiber and avoid regularly eating high fat or \"junk\" foods?  Yes    Taking medications regularly:  Yes    Medication side effects:  Not applicable    Ability to successfully perform activities of daily living:  No assistance needed    Home Safety:  No safety concerns identified    Hearing Impairment:  No hearing concerns    In the past 6 months, have you been bothered by leaking of urine?  No    In general, how would you rate your overall mental or emotional health?  Fair      PHQ-2 Total Score: 0    Additional concerns today:  Yes    Continues to refuse asa and rosuvastatin. He feels that it causes him to have a head rush sensation. He also mentions some tingling in the arms and legs. He had cervical spine surgery about 1 year ago. He has gabapentin available to use three times daily he has not used for awhile.      The 10-year ASCVD risk score (Mart CONTRERAS, et al., 2019) is: 9.7%    Values used to calculate the score:      Age: 53 years      Sex: Male      Is Non- : Yes      Diabetic: No      Tobacco smoker: Yes      Systolic Blood Pressure: 130 mmHg      Is BP treated: No      HDL Cholesterol: 49 mg/dL      Total Cholesterol: 165 mg/dL            Hyperlipidemia Follow-Up      Are you regularly taking any medication or supplement to lower your cholesterol?   Yes- rosuvastain    Are you having muscle aches or other side effects that you think could be caused by your cholesterol lowering medication?  Yes- possibly neck and lower back    Patient states " that his toes have been burning. There may be a pinched nerve in his neck making his toes numb.  Today's PHQ-2 Score:       6/15/2023    10:54 AM   PHQ-2 ( 1999 [employer])   Q1: Little interest or pleasure in doing things 0   Q2: Feeling down, depressed or hopeless 0   PHQ-2 Score 0   Q1: Little interest or pleasure in doing things Not at all   Q2: Feeling down, depressed or hopeless Not at all   PHQ-2 Score 0           Social History     Tobacco Use     Smoking status: Every Day     Packs/day: 2.00     Years: 23.00     Pack years: 46.00     Types: Cigarettes     Smokeless tobacco: Never     Tobacco comments:     Slowing down a lot   Vaping Use     Vaping status: Some Days     Substances: Nicotine, CBD     Passive vaping exposure: Yes   Substance Use Topics     Alcohol use: No             6/15/2023    10:51 AM   Alcohol Use   Prescreen: >3 drinks/day or >7 drinks/week? No       Last PSA:   Prostate Specific Antigen Screen   Date Value Ref Range Status   06/14/2022 0.77 0.00 - 3.50 ug/L Final       Reviewed orders with patient. Reviewed health maintenance and updated orders accordingly - Yes  Lab work is in process  Labs reviewed in EPIC  BP Readings from Last 3 Encounters:   06/15/23 130/78   04/27/23 118/68   10/25/22 (!) 149/81    Wt Readings from Last 3 Encounters:   06/15/23 74.8 kg (165 lb)   04/27/23 78.9 kg (174 lb)   10/25/22 77.5 kg (170 lb 12.8 oz)                  Patient Active Problem List   Diagnosis     Schizophrenia (H)     Positive reaction to tuberculin skin test     Anorexia     Bipolar I disorder, single manic episode (H)     Borderline intellectual functioning     Candidiasis of esophagus (H)     Constipation     Delusional disorder (H)     Drug dependence, in remission (H)     Drug induced subacute dyskinesia     Gastritis due to Helicobacter species     Gastroesophageal reflux disease without esophagitis     Hyperlipidemia     Mild persistent asthma     Nocturnal enuresis     Nondependent  cannabis abuse     Pityriasis versicolor     Polyp of colon     Psychophysiological insomnia     Rectal hemorrhage     Rhinitis, allergic     Schizoaffective disorder, bipolar type (H)     Transient ischemic attack     Urinary urgency     Verruca plantaris     History of Helicobacter pylori infection     Cervical spondylosis with myelopathy     Past Surgical History:   Procedure Laterality Date     COLONOSCOPY N/A 8/13/2021    Procedure: COLONOSCOPY;  Surgeon: Stewart Monsalve MD;  Location: [facility]     FUSION CERVICAL ANTERIOR ONE LEVEL N/A 5/6/2022    Procedure: Cervical 5 - Cervical 6 anterior cervical decompression and fusion with plate;  Surgeon: Laurie Gage MD;  Location[facility] Main OR       Social History     Tobacco Use     Smoking status: Every Day     Packs/day: 2.00     Years: 23.00     Pack years: 46.00     Types: Cigarettes     Smokeless tobacco: Never     Tobacco comments:     Slowing down a lot   Vaping Use     Vaping status: Some Days     Substances: Nicotine, CBD     Passive vaping exposure: Yes   Substance Use Topics     Alcohol use: No     Family History   Problem Relation Age of Onset     Diabetes Mother      Coronary Artery Disease Mother      Coronary Artery Disease Father      Cancer No family hx of          Current Outpatient Medications   Medication Sig Dispense Refill     acetaminophen (TYLENOL) 325 MG tablet Take 3 tablets (975 mg) by mouth every 8 hours as needed for pain 90 tablet 1     albuterol (PROAIR HFA/PROVENTIL HFA/VENTOLIN HFA) 108 (90 Base) MCG/ACT inhaler Inhale 2 puffs into the lungs every 4 hours as needed for shortness of breath / dyspnea or wheezing 18 g 0     aspirin (ASPIRIN LOW DOSE) 81 MG chewable tablet Take 1 tablet every other day at lunch time 28 tablet 11     calcium polycarbophil (FIBERCON) 625 MG tablet Take 2 tablets (1,250 mg) by mouth as needed for constipation 60 tablet 11     cetirizine (ZYRTEC) 10 MG tablet TAKE ONE TABLET BY MOUTH EVERY DAY 90  tablet 3     EPINEPHrine (ANY BX GENERIC EQUIV) 0.3 MG/0.3ML injection 2-pack Inject the contents of one device into the muscle as needed for anaphylaxis; may repeat one time in 5-15 minutes if response to initial dose is inadequate 2 each 1     gabapentin (NEURONTIN) 100 MG capsule Take 1 capsule (100 mg) by mouth 3 times daily as needed for neuropathic pain 90 capsule 0     guaiFENesin (ROBITUSSIN) 20 mg/mL liquid Take 10 mLs (200 mg) by mouth every 4 hours as needed for cough 473 mL 3     ketoconazole (NIZORAL) 2 % external shampoo Apply topically daily as needed for itching or irritation 100 mL 1     magnesium hydroxide (MILK OF MAGNESIA) 400 MG/5ML suspension Take 30 mLs by mouth daily as needed for constipation or heartburn 900 mL 4     melatonin 3 MG tablet Take 2 tablets (6 mg) by mouth nightly as needed for sleep (Patient taking differently: Take 3 mg by mouth At Bedtime 2 tablets at bedtime) 90 tablet 3     Menthol-Camphor (ICY HOT ADVANCED PAIN RELIEF) 16-11 % CREA Externally apply 2 g topically 5 x daily PRN (for pain on shoulder for muscle spasm and joint pain) 56 g 0     nicotine (NICODERM CQ) 21 MG/24HR 24 hr patch Place 1 patch onto the skin daily as needed for smoking cessation 30 patch 0     OLANZapine (ZYPREXA) 15 MG tablet Take 15 mg by mouth At Bedtime       OLANZapine (ZYPREXA) 5 MG tablet Take 5 mg by mouth daily as needed for anxiety or agitation       rosuvastatin (CRESTOR) 10 MG tablet Take 1 tab every other day in the evening. 30 tablet 11     senna-docusate (SENOKOT-S/PERICOLACE) 8.6-50 MG tablet Take 1 tablet by mouth daily as needed for constipation 90 tablet 2     Allergies   Allergen Reactions     Fish Oil Swelling and Other (See Comments)     Facial swelling and sleepiness     Latex      Other reaction(s): Other (see comments)     Mushroom Hives     Olive Oil Unknown and Other (See Comments)     Peanut (Diagnostic)      Other reaction(s): Edema     Penicillins      Other reaction(s):  Other (see comments)     Trazodone      Other reaction(s): Other (see comments)     Recent Labs   Lab Test 12/30/22  1610 06/14/22  1115 05/05/22  1817 12/07/21  1151 12/07/21  1151 12/04/20  0000 09/30/20  0000 05/06/15  1401   A1C 5.7* 5.6  --   --   --  6*  --   --    * 81  --   --   --  77  --   --    HDL 49 57  --   --   --  55  --   --    TRIG 62 38  --   --   --  96  --   --    ALT 9*  --   --   --  22 34  --   --    CR 0.98  --  0.93   < > 0.97  --  1.03 1.0   GFRESTIMATED >90  --  >90   < > >90  --  85 86.3   GFRESTBLACK  --   --   --   --   --   --  >90 104.4   POTASSIUM 3.3*  --  3.8   < > 3.8  --  3.3* 3.2*   TSH  --   --   --   --   --   --  0.6  --     < > = values in this interval not displayed.        Reviewed and updated as needed this visit by clinical staff   Tobacco  Allergies  Meds  Problems  Med Hx  Surg Hx  Fam Hx          Reviewed and updated as needed this visit by Provider                 Past Medical History:   Diagnosis Date     Bipolar disorder (H)      Borderline intellectual functioning      Cervical spondylosis with myelopathy 05/05/2022     Diffuse traumatic brain injury (H)      Mild persistent asthma 04/27/2018     Schizophrenia (H)       Past Surgical History:   Procedure Laterality Date     COLONOSCOPY N/A 8/13/2021    Procedure: COLONOSCOPY;  Surgeon: Stewart Monsalve MD;  Location: [facility]     FUSION CERVICAL ANTERIOR ONE LEVEL N/A 5/6/2022    Procedure: Cervical 5 - Cervical 6 anterior cervical decompression and fusion with plate;  Surgeon: Laurie Gage MD;  Location: [facility] OR       Review of Systems   Constitutional: Negative for chills and fever.   HENT: Positive for ear pain. Negative for congestion, hearing loss and sore throat.    Eyes: Positive for pain and visual disturbance.   Respiratory: Positive for cough. Negative for shortness of breath.    Cardiovascular: Negative for chest pain, palpitations and peripheral edema.   Gastrointestinal:  "Positive for constipation. Negative for abdominal pain, diarrhea, heartburn, hematochezia and nausea.   Genitourinary: Negative for dysuria, frequency, genital sores, hematuria, impotence, penile discharge and urgency.   Musculoskeletal: Negative for arthralgias, joint swelling and myalgias.   Skin: Negative for rash.   Neurological: Negative for dizziness, weakness, headaches and paresthesias.   Psychiatric/Behavioral: Negative for mood changes. The patient is not nervous/anxious.          OBJECTIVE:   /78 (BP Location: Right arm, Patient Position: Sitting, Cuff Size: Adult Regular)   Pulse 71   Temp 96.9  F (36.1  C) (Tympanic)   Resp 16   Ht 1.867 m (6' 1.5\")   Wt 74.8 kg (165 lb)   SpO2 96%   BMI 21.47 kg/m      Physical Exam  GENERAL: healthy, alert and no distress  EYES: Eyes grossly normal to inspection, PERRL and conjunctivae and sclerae normal  HENT: ear canals and TM's normal, nose and mouth without ulcers or lesions  NECK: no adenopathy, no asymmetry, masses, or scars and thyroid normal to palpation  RESP: lungs clear to auscultation - no rales, rhonchi or wheezes  CV: regular rate and rhythm, normal S1 S2, no S3 or S4, no murmur, click or rub, no peripheral edema and peripheral pulses strong  ABDOMEN: soft, nontender, no hepatosplenomegaly, no masses and bowel sounds normal  MS: no gross musculoskeletal defects noted, no edema  SKIN: no suspicious lesions or rashes  NEURO: Normal strength and tone, mentation intact and speech normal  PSYCH: mentation appears normal, affect normal/bright      ASSESSMENT/PLAN:       ICD-10-CM    1. Routine general medical examination at a health care facility  Z00.00       2. Gastroesophageal reflux disease without esophagitis  K21.9 magnesium hydroxide (MILK OF MAGNESIA) 400 MG/5ML suspension      3. Cough, unspecified type  R05.9 guaiFENesin (ROBITUSSIN) 20 mg/mL liquid      4. Transient ischemic attack  G45.9 aspirin (ASPIRIN LOW DOSE) 81 MG chewable tablet "      5. Constipation, unspecified constipation type  K59.00 magnesium hydroxide (MILK OF MAGNESIA) 400 MG/5ML suspension      6. 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 Cervical spondylosis with myelopathy  M47.12         Healthy Male exam completed today.  I discussed preventative measures with the patient including continuing with lifestyle modifications of a balanced diet and regular cardiovascular exercise.     Updated patient's medication list.  He is moving back to home here in [facility].  Refilled medications for patient that were due.  Changed time of day when he takes his aspirin.  Discussed with patient I would like him to take the aspirin and rosuvastatin daily instead of every other day however he declines this change and feels that he may adhere to every other day better now that he knows why I want him to take it.    Has a history of anaphylaxis type reaction to fish and peanuts.  He also has a bee allergy EpiPen given today.    In terms of his cervical spine stenosis advised patient to follow-up with neurosurgery/orthopedics.  He has gabapentin available to utilize for numbness and tingling encouraged him to try this.    I encouraged patient to follow-up yearly for annual physicals and sooner as needed.      Patient has been advised of split billing requirements and indicates understanding: Yes      COUNSELING:   Reviewed preventive health counseling, as reflected in patient instructions       Regular exercise       Healthy diet/nutrition        He reports that he has been smoking cigarettes. He has a 46.00 pack-year smoking history. He has never used smokeless tobacco.  Nicotine/Tobacco Cessation Plan:   discussed with patient. Encouraged continued efforts at smoking cessation.         TIERRA Dickens [facility]

## 2023-06-20 NOTE — TELEPHONE ENCOUNTER
Message sent to crescencio casillas as pt is seeing her on 20th for an appt and for her to let us know if this is needed still.

## 2023-07-05 ENCOUNTER — TELEPHONE (OUTPATIENT)
Dept: FAMILY MEDICINE | Facility: CLINIC | Age: 53
End: 2023-07-05
Payer: MEDICAID

## 2023-07-05 DIAGNOSIS — Z72.0 TOBACCO ABUSE: ICD-10-CM

## 2023-07-05 NOTE — TELEPHONE ENCOUNTER
Jada states that Ihsan was to discontinue Nicorette Gum and switch to patches at the end of May. Apparently the group home did not make that switch so when they recognized the error over the weekend and stopped the gum and offered the patch the patient did not want the patches and got very agitated with the staff telling them he only wants the gum. So they called the on-call over the weekend and they gave Rx for enough gum to get Ihsan through so he did not go through withdrawal until the pharmacy could contact PCP Mayda Albarran to have patches taken off med list and reinstate the gum. Thank you!    Ena Alfonso RN

## 2023-08-11 DIAGNOSIS — M54.2 NECK PAIN: ICD-10-CM

## 2023-08-11 DIAGNOSIS — K59.01 SLOW TRANSIT CONSTIPATION: ICD-10-CM

## 2023-08-11 DIAGNOSIS — J45.30 MILD PERSISTENT ASTHMA WITHOUT COMPLICATION: ICD-10-CM

## 2023-08-11 DIAGNOSIS — B36.0 TINEA VERSICOLOR: ICD-10-CM

## 2023-08-11 DIAGNOSIS — M54.12 CERVICAL RADICULOPATHY: ICD-10-CM

## 2023-08-11 RX ORDER — ALBUTEROL SULFATE 90 UG/1
AEROSOL, METERED RESPIRATORY (INHALATION)
Qty: 18 G | Refills: 0 | Status: SHIPPED | OUTPATIENT
Start: 2023-08-11 | End: 2024-05-08

## 2023-08-11 RX ORDER — AMOXICILLIN 250 MG
CAPSULE ORAL
Qty: 30 TABLET | Refills: 2 | Status: SHIPPED | OUTPATIENT
Start: 2023-08-11 | End: 2024-07-18

## 2023-08-11 RX ORDER — GABAPENTIN 100 MG/1
CAPSULE ORAL
Qty: 30 CAPSULE | Refills: 0 | Status: SHIPPED | OUTPATIENT
Start: 2023-08-11 | End: 2023-11-06

## 2023-08-11 RX ORDER — KETOCONAZOLE 20 MG/ML
SHAMPOO TOPICAL
Qty: 120 ML | Refills: 1 | Status: SHIPPED | OUTPATIENT
Start: 2023-08-11 | End: 2023-09-22

## 2023-08-11 NOTE — TELEPHONE ENCOUNTER
PCP is [place].  This refill pool dose not have permissions to refill for [place] [facility].    GEORGE PEREZ RN on 8/11/2023 at 12:42 PM

## 2023-08-15 ENCOUNTER — OFFICE VISIT (OUTPATIENT)
Dept: FAMILY MEDICINE | Facility: CLINIC | Age: 53
End: 2023-08-15
Payer: COMMERCIAL

## 2023-08-15 VITALS
SYSTOLIC BLOOD PRESSURE: 124 MMHG | TEMPERATURE: 97.2 F | HEIGHT: 74 IN | OXYGEN SATURATION: 96 % | RESPIRATION RATE: 14 BRPM | HEART RATE: 62 BPM | BODY MASS INDEX: 23.61 KG/M2 | DIASTOLIC BLOOD PRESSURE: 80 MMHG | WEIGHT: 184 LBS

## 2023-08-15 DIAGNOSIS — M47.12 CERVICAL SPONDYLOSIS WITH MYELOPATHY: ICD-10-CM

## 2023-08-15 DIAGNOSIS — M54.12 CERVICAL RADICULOPATHY: ICD-10-CM

## 2023-08-15 DIAGNOSIS — M54.2 NECK PAIN: ICD-10-CM

## 2023-08-15 DIAGNOSIS — L84 CALLUS OF TOE: Primary | ICD-10-CM

## 2023-08-15 DIAGNOSIS — E78.2 MIXED HYPERLIPIDEMIA: ICD-10-CM

## 2023-08-15 DIAGNOSIS — L84 CALLUS OF HEEL: ICD-10-CM

## 2023-08-15 DIAGNOSIS — G45.9 TRANSIENT ISCHEMIC ATTACK: ICD-10-CM

## 2023-08-15 PROCEDURE — 99214 OFFICE O/P EST MOD 30 MIN: CPT | Performed by: NURSE PRACTITIONER

## 2023-08-15 RX ORDER — MAGNESIUM HYDROXIDE 1200 MG/15ML
LIQUID ORAL
COMMUNITY
Start: 2023-08-11 | End: 2024-08-21

## 2023-08-15 RX ORDER — ROSUVASTATIN CALCIUM 10 MG/1
TABLET, COATED ORAL
Qty: 30 TABLET | Refills: 11 | Status: SHIPPED | OUTPATIENT
Start: 2023-08-15 | End: 2024-06-06

## 2023-08-15 RX ORDER — ASPIRIN 81 MG/1
TABLET, CHEWABLE ORAL
Qty: 28 TABLET | Refills: 11 | Status: SHIPPED | OUTPATIENT
Start: 2023-08-15 | End: 2024-06-06

## 2023-08-15 ASSESSMENT — PAIN SCALES - GENERAL: PAINLEVEL: NO PAIN (0)

## 2023-08-15 NOTE — PROGRESS NOTES
Assessment & Plan     Callus of toe  Callus of heel  Calluses addressed today by parring down the buildup of tissue.  Following reduction of the calluses patient had improved symptoms of pain. Recommend repeating as needed    Transient ischemic attack  encouraged patient to take this medication daily  - aspirin (ASPIRIN LOW DOSE) 81 MG chewable tablet; Take 1 tablet daily in the evening    Mixed hyperlipidemia  Advised to take this daily.   - rosuvastatin (CRESTOR) 10 MG tablet; Take 1 tab every day in the evening.    Cervical spondylosis with myelopathy  Follow up recommended with neurosurgery.  Referral placed.  Reviewed last note in October did not follow through with imaging that they had recommended.  Orders placed today.  - Neurosurgery Referral; Future  - MR Cervical Spine w/o Contrast; Future  - CT Cervical Spine w/o Contrast; Future    Neck pain  See above  - Neurosurgery Referral; Future  - MR Cervical Spine w/o Contrast; Future  - CT Cervical Spine w/o Contrast; Future    Cervical radiculopathy  See above  - Neurosurgery Referral; Future  - MR Cervical Spine w/o Contrast; Future  - CT Cervical Spine w/o Contrast; Future                 TIERRA Dickens CNP  [facility]   Ihsan is a 53 year old, presenting for the following health issues:  Foot Problems (Burning in toes), Health Maintenance (Would like to discuss the Shingles and pneumonia shot today.), and Recheck Medication (Would like zyrtec to be prn)        8/15/2023     1:04 PM   Additional Questions   Roomed by Diana GAINES CMA         History of Present Illness       Reason for visit:  Burning wart on toe    He eats 2-3 servings of fruits and vegetables daily.He consumes 8 sweetened beverage(s) daily.He exercises with enough effort to increase his heart rate 9 or less minutes per day.  He exercises with enough effort to increase his heart rate 3 or less days per week. He is missing 7 dose(s) of medications  "per week.     Calluses on toes and bottom of foot continue to be bothersome have built back up again we have treated these previously with putting them down with a scalpel he would like this to be repeated.    Patient is having burning in the toes of both feet, mostly the left. Was told this is a sign that he will need surgery on the neck/ back.  He had last seen neurosurgery in October 2022 and was advised to obtain repeat MRI and CT of his cervical spine this was not followed through on and he has not followed up since that time.  Given his ongoing symptoms of left-sided neck pain and arm pain tingling and foot pain will repeat imaging today and neurosurgery referral will be needed.  Advised following up and imaging and he agrees with completing this.    In terms of his medications I discussed with patient to take his medications on a daily basis as they are prescribed and what they are for.      Review of Systems   Constitutional, HEENT, cardiovascular, pulmonary, gi and gu systems are negative, except as otherwise noted.      Objective    /80 (BP Location: Right arm, Patient Position: Sitting, Cuff Size: Adult Regular)   Pulse 62   Temp 97.2  F (36.2  C) (Tympanic)   Resp 14   Ht 1.867 m (6' 1.5\")   Wt 83.5 kg (184 lb)   SpO2 96%   BMI 23.95 kg/m    Body mass index is 23.95 kg/m .  Physical Exam   GENERAL: healthy, alert and no distress  NECK: no adenopathy, no asymmetry, masses, or scars and thyroid normal to palpation  RESP: lungs clear to auscultation - no rales, rhonchi or wheezes  CV: regular rate and rhythm, normal S1 S2, no S3 or S4, no murmur, click or rub, no peripheral edema and peripheral pulses strong  ABDOMEN: soft, nontender, no hepatosplenomegaly, no masses and bowel sounds normal  MS: no gross musculoskeletal defects noted, no edema  SKIN: no suspicious lesions or rashes except for significant calluses on the the second toes bilaterally and bottom of right foot.  Bilateral heel " calluses present did not work on parring these calluses down.

## 2023-08-17 ENCOUNTER — TELEPHONE (OUTPATIENT)
Dept: FAMILY MEDICINE | Facility: CLINIC | Age: 53
End: 2023-08-17
Payer: MEDICAID

## 2023-08-17 DIAGNOSIS — J30.1 SEASONAL ALLERGIC RHINITIS DUE TO POLLEN: ICD-10-CM

## 2023-08-17 RX ORDER — CETIRIZINE HYDROCHLORIDE 10 MG/1
10 TABLET ORAL DAILY
Qty: 90 TABLET | Refills: 3 | COMMUNITY
Start: 2023-08-17 | End: 2023-09-22

## 2023-08-17 NOTE — TELEPHONE ENCOUNTER
Nick from [facility] calling. Requesting Cetrizine to be ordered at bedtime.   Patient is declining to take in the daytime.     Pended.     Carolyn Garcia RN on 8/17/2023 at 1:37 PM

## 2023-08-22 ENCOUNTER — TELEPHONE (OUTPATIENT)
Dept: NEUROSURGERY | Facility: CLINIC | Age: 53
End: 2023-08-22
Payer: MEDICAID

## 2023-08-22 NOTE — TELEPHONE ENCOUNTER
1st attempt at [employer] referral. I called and spoke with family member. Patient will call clinic back to schedule appt.     Referred by Mayda Albarran APRN CNP in  F[facility]ILY PRACTICE  Cervical spondylosis with myelopathy [M47.12]  Neck pain [M54.2]  Cervical radiculopathy [M54.12]  MRI  PT  Inj  Surg

## 2023-08-30 DIAGNOSIS — G24.3 SPASMODIC TORTICOLLIS: ICD-10-CM

## 2023-08-30 RX ORDER — CAMPHOR, MENTHOL 11; 16 G/100ML; G/100ML
2 SPRAY TOPICAL
Qty: 56 G | Refills: 0 | Status: SHIPPED | OUTPATIENT
Start: 2023-08-30

## 2023-09-05 DIAGNOSIS — K21.9 GASTROESOPHAGEAL REFLUX DISEASE WITHOUT ESOPHAGITIS: ICD-10-CM

## 2023-09-05 NOTE — TELEPHONE ENCOUNTER
Famotidine was discontinued 6/15/23 at office visit with Mayda Albarran due to patient reported not taking. Medication is not active on patient's med list.    Cued up refill for Mayda Albarran to evaluate patient's request to restart medication.     Tonja Vang RN on 9/5/2023 at 4:42 PM

## 2023-09-06 ENCOUNTER — OFFICE VISIT (OUTPATIENT)
Dept: NEUROSURGERY | Facility: CLINIC | Age: 53
End: 2023-09-06
Payer: COMMERCIAL

## 2023-09-06 VITALS — HEART RATE: 61 BPM | OXYGEN SATURATION: 97 % | DIASTOLIC BLOOD PRESSURE: 63 MMHG | SYSTOLIC BLOOD PRESSURE: 113 MMHG

## 2023-09-06 DIAGNOSIS — M54.2 NECK PAIN: Primary | ICD-10-CM

## 2023-09-06 DIAGNOSIS — R20.2 TINGLING OF LEFT UPPER EXTREMITY: ICD-10-CM

## 2023-09-06 PROCEDURE — 99213 OFFICE O/P EST LOW 20 MIN: CPT | Performed by: NURSE PRACTITIONER

## 2023-09-06 RX ORDER — FAMOTIDINE 20 MG/1
20 TABLET, FILM COATED ORAL 2 TIMES DAILY PRN
Qty: 180 TABLET | Refills: 0 | Status: SHIPPED | OUTPATIENT
Start: 2023-09-06 | End: 2024-07-18

## 2023-09-06 ASSESSMENT — PAIN SCALES - GENERAL: PAINLEVEL: MILD PAIN (3)

## 2023-09-06 NOTE — PROGRESS NOTES
Neurosurgery Follow UP  September 6, 2023    A/P: Ihsan Marcus is known to our team with prior C5-6 ACDF 5/6/2022 for cervical myelopathy with Dr Gage. Here today for new left sided neck pain, shoulder pain that started a month ago after heavy lifting. He has developed headaches that are pain related. He feels he has some numbness in his left arm, hand/fingers that is new and chronic left sided facial numbness that comes and goes (prior stroke). He reports dropping items from his left hand such as papers but not his doll. He reports toes are burning on both feet which had gotten better after prior surgery. He feels like his legs will give out when he first stands up in the morning. Reports tingling in his bilateral legs. Tylenol sometimes helps his symptoms.     [phone] The house Ask for Ihsan or Boris     Will start with MRI cervical   May benefit from EMG  I will call Ihsan or his staff Boris with results and next steps     HPI: Patient last seen in our clinic 10/2022. Never had one year post op films. Hx hx bipolar 1, chemical dependence, gastritis, gerd, asthma, and tia, sp C5-6 ACDF by Dr Gage on 05/06/2022. Preop c/o 3mos progressive myelopathy symptoms.     Physical Exam  /63   Pulse 61   SpO2 97%     General: alert, oriented. BANDA. Speech is clear.     Motor: normal bulk and tone     Strength:   Full strength in all extremities     Sensation: diminished to touch to left arm, left face     Reflexes: hyperreflexia on the right radial, brachial; no alfaro    Coordination: steady gait     Imaging:   MRI 4/2022  1. Multilevel degenerative changes of the cervical spine, as described.  2. Large disc extrusion at C5-C6 contributes to severe spinal canal stenosis with cord compression and cord signal abnormality. 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 Recommend spine surgery consultation.  3. Moderate spinal canal stenosis at C3-C4 and C4-C5.  4. Varying degrees of multilevel neural foraminal  stenosis, as detailed above.  5. Partially visualized asymmetric enlargement and some degree of heterogeneity involving the left thyroid lobe, incompletely imaged/evaluated. Consider follow-up thyroid ultrasound for further evaluation.    DICK Flanagan  [facility]  O: [phone]

## 2023-09-06 NOTE — NURSING NOTE
"Ihsan Marcus is a 53 year old male who presents for:  Chief Complaint   Patient presents with    Follow Up     Left neck and shoulder numbness  Left UE pain  Headaches on and off        Initial Vitals:  /63   Pulse 61   SpO2 97%  Estimated body mass index is 23.95 kg/m  as calculated from the following:    Height as of 8/15/23: 6' 1.5\" (1.867 m).    Weight as of 8/15/23: 184 lb (83.5 kg).. There is no height or weight on file to calculate BSA. BP completed using cuff size: regular  Mild Pain (3)        Alex Fuller  "

## 2023-09-06 NOTE — PATIENT INSTRUCTIONS
MRI cervical spine  I will call you with results and next steps   Call me if you don't hear from me

## 2023-09-06 NOTE — LETTER
9/6/2023         RE: Ihsan Marcus  [address]  [place] [address]        Dear Colleague,    Thank you for referring your patient, Ihsan Marcus, to the [facility]. Please see a copy of my visit note below.    Neurosurgery Follow UP  September 6, 2023    A/P: Ihsan Marcus is known to our team with prior C5-6 ACDF 5/6/2022 for cervical myelopathy with Dr Gage. Here today for new left sided neck pain, shoulder pain that started a month ago after heavy lifting. He has developed headaches that are pain related. He feels he has some numbness in his left arm, hand/fingers that is new and chronic left sided facial numbness that comes and goes (prior stroke). He reports dropping items from his left hand such as papers but not his doll. He reports toes are burning on both feet which had gotten better after prior surgery. He feels like his legs will give out when he first stands up in the morning. Reports tingling in his bilateral legs. Tylenol sometimes helps his symptoms.     [phone] The house Ask for Ihsan or Boris     Will start with MRI cervical   May benefit from EMG  I will call Ihsan or his staff Boris with results and next steps     HPI: Patient last seen in our clinic 10/2022. Never had one year post op films. Hx hx bipolar 1, chemical dependence, gastritis, gerd, asthma, and tia, sp C5-6 ACDF by Dr Gage on 05/06/2022. Preop c/o 3mos progressive myelopathy symptoms.     Physical Exam  /63   Pulse 61   SpO2 97%     General: alert, oriented. BANDA. Speech is clear.     Motor: normal bulk and tone     Strength:   Full strength in all extremities     Sensation: diminished to touch to left arm, left face     Reflexes: hyperreflexia on the right radial, brachial; no alfaro    Coordination: steady gait     Imaging:   MRI 4/2022  1. Multilevel degenerative changes of the cervical spine, as described.  2. Large disc extrusion at C5-C6 contributes to severe spinal canal  stenosis with cord compression and cord signal abnormality. 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 Recommend spine surgery consultation.  3. Moderate spinal canal stenosis at C3-C4 and C4-C5.  4. Varying degrees of multilevel neural foraminal stenosis, as detailed above.  5. Partially visualized asymmetric enlargement and some degree of heterogeneity involving the left thyroid lobe, incompletely imaged/evaluated. Consider follow-up thyroid ultrasound for further evaluation.    JOSEFINA Flanagan-CNP  [facility] Neurosurgery  O: [phone]        Again, thank you for allowing me to participate in the care of your patient.        Sincerely,        TIERRA Lubin CNP

## 2023-09-19 ENCOUNTER — HOSPITAL ENCOUNTER (OUTPATIENT)
Dept: CT IMAGING | Facility: CLINIC | Age: 53
Discharge: HOME | End: 2023-09-19
Attending: NURSE PRACTITIONER
Payer: COMMERCIAL

## 2023-09-19 ENCOUNTER — HOSPITAL ENCOUNTER (OUTPATIENT)
Dept: MRI IMAGING | Facility: CLINIC | Age: 53
Discharge: HOME | End: 2023-09-19
Attending: NURSE PRACTITIONER
Payer: COMMERCIAL

## 2023-09-19 DIAGNOSIS — R20.2 TINGLING OF LEFT UPPER EXTREMITY: ICD-10-CM

## 2023-09-19 DIAGNOSIS — M54.2 NECK PAIN: ICD-10-CM

## 2023-09-19 DIAGNOSIS — M54.12 CERVICAL RADICULOPATHY: ICD-10-CM

## 2023-09-19 DIAGNOSIS — M47.12 CERVICAL SPONDYLOSIS WITH MYELOPATHY: ICD-10-CM

## 2023-09-19 PROCEDURE — 72141 MRI NECK SPINE W/O DYE: CPT

## 2023-09-19 PROCEDURE — 72125 CT NECK SPINE W/O DYE: CPT

## 2023-09-21 ENCOUNTER — TELEPHONE (OUTPATIENT)
Dept: NEUROSURGERY | Facility: CLINIC | Age: 53
End: 2023-09-21
Payer: MEDICAID

## 2023-09-21 DIAGNOSIS — M54.12 CERVICAL RADICULOPATHY: Primary | ICD-10-CM

## 2023-09-21 NOTE — TELEPHONE ENCOUNTER
Called [facility] facility about patient MRI of which was reviewed    FINDINGS: No signal changes to suggest acute fracture or traumatic  vertebral body subluxation. No suspicious marrow lesion. No  prevertebral edema. Normal cord signal. Left lobe of thyroid nodule as  seen on same-day CT.       The findings on a level by level basis are as follows:     Craniocervical junction, C1-C2: Within normal limits.     C2-C3: Normal disc. Normal facets. No significant spinal canal or  neural foraminal stenosis.      C3-C4: Mild loss of disc, intradiscal T2 signal. Bilateral  uncovertebral spurring left greater than right causing severe left and  moderate right neural foraminal stenosis. No significant spinal canal  stenosis.     C4-C5: Preserved disc height, intradiscal T2 signal. Posterior disc  osteophyte complex causing partial ventral CSF space effacement and  mild cord flattening. No significant spinal canal or neural foraminal  stenosis.     C5-C6: Fusion level. Posterior disc osteophyte complex, uncovertebral  spurring causing partial CSF space effacement, associated mild canal  stenosis and moderate bilateral neural foraminal stenosis.     C6-C7: Normal disc. Normal facets. No significant spinal canal or  neural foraminal stenosis.     C7-T1: Normal disc. Normal facets. No significant spinal canal or  neural foraminal stenosis.                                                                      IMPRESSION:  1. Postsurgical changes of C5-C6 anterior fusion. No MRI findings of  hardware complication.  2. Multilevel cervical spondylosis most pronounced at C3-C4. No  high-grade spinal canal stenosis. No myelopathic cord signal.  3. Left lobe of thyroid nodule as seen on same-day CT. Suspect lesion  is stable since 2022. Ultrasound can be helpful in further evaluation.      Recommendations for physical therapy as well as left C3-C4 TF BA     Should conservative treatment not provide relief would plan to follow up for  further recommendations.     Ruthann Aguiar PA-C  [facility]  O: [phone]

## 2023-09-22 ENCOUNTER — OFFICE VISIT (OUTPATIENT)
Dept: FAMILY MEDICINE | Facility: CLINIC | Age: 53
End: 2023-09-22
Payer: COMMERCIAL

## 2023-09-22 ENCOUNTER — LAB (OUTPATIENT)
Dept: LAB | Facility: CLINIC | Age: 53
End: 2023-09-22
Payer: COMMERCIAL

## 2023-09-22 ENCOUNTER — TELEPHONE (OUTPATIENT)
Dept: FAMILY MEDICINE | Facility: CLINIC | Age: 53
End: 2023-09-22

## 2023-09-22 VITALS
HEART RATE: 63 BPM | BODY MASS INDEX: 24.13 KG/M2 | DIASTOLIC BLOOD PRESSURE: 82 MMHG | RESPIRATION RATE: 16 BRPM | WEIGHT: 188 LBS | OXYGEN SATURATION: 96 % | TEMPERATURE: 97.3 F | SYSTOLIC BLOOD PRESSURE: 128 MMHG | HEIGHT: 74 IN

## 2023-09-22 DIAGNOSIS — B36.0 TINEA VERSICOLOR: ICD-10-CM

## 2023-09-22 DIAGNOSIS — R19.5 DARK STOOLS: ICD-10-CM

## 2023-09-22 DIAGNOSIS — J30.1 SEASONAL ALLERGIC RHINITIS DUE TO POLLEN: ICD-10-CM

## 2023-09-22 DIAGNOSIS — K21.9 GASTROESOPHAGEAL REFLUX DISEASE WITHOUT ESOPHAGITIS: ICD-10-CM

## 2023-09-22 DIAGNOSIS — K59.00 CONSTIPATION, UNSPECIFIED CONSTIPATION TYPE: ICD-10-CM

## 2023-09-22 DIAGNOSIS — L84 CALLUS OF TOE: Primary | ICD-10-CM

## 2023-09-22 LAB
ALBUMIN SERPL BCG-MCNC: 4.6 G/DL (ref 3.5–5.2)
ALP SERPL-CCNC: 104 U/L (ref 40–129)
ALT SERPL W P-5'-P-CCNC: 17 U/L (ref 0–70)
ANION GAP SERPL CALCULATED.3IONS-SCNC: 12 MMOL/L (ref 7–15)
AST SERPL W P-5'-P-CCNC: 23 U/L (ref 0–45)
BILIRUB SERPL-MCNC: 0.4 MG/DL
BUN SERPL-MCNC: 8.9 MG/DL (ref 6–20)
CALCIUM SERPL-MCNC: 9.2 MG/DL (ref 8.6–10)
CHLORIDE SERPL-SCNC: 102 MMOL/L (ref 98–107)
CREAT SERPL-MCNC: 0.98 MG/DL (ref 0.67–1.17)
DEPRECATED HCO3 PLAS-SCNC: 27 MMOL/L (ref 22–29)
EGFRCR SERPLBLD CKD-EPI 2021: >90 ML/MIN/1.73M2
ERYTHROCYTE [DISTWIDTH] IN BLOOD BY AUTOMATED COUNT: 14.3 % (ref 10–15)
GLUCOSE SERPL-MCNC: 94 MG/DL (ref 70–99)
HCT VFR BLD AUTO: 44.3 % (ref 40–53)
HGB BLD-MCNC: 14.5 G/DL (ref 13.3–17.7)
MCH RBC QN AUTO: 29.7 PG (ref 26.5–33)
MCHC RBC AUTO-ENTMCNC: 32.7 G/DL (ref 31.5–36.5)
MCV RBC AUTO: 91 FL (ref 78–100)
PLATELET # BLD AUTO: 267 10E3/UL (ref 150–450)
POTASSIUM SERPL-SCNC: 4 MMOL/L (ref 3.4–5.3)
PROT SERPL-MCNC: 7.3 G/DL (ref 6.4–8.3)
RBC # BLD AUTO: 4.88 10E6/UL (ref 4.4–5.9)
SODIUM SERPL-SCNC: 141 MMOL/L (ref 136–145)
WBC # BLD AUTO: 6.6 10E3/UL (ref 4–11)

## 2023-09-22 PROCEDURE — 80053 COMPREHEN METABOLIC PANEL: CPT

## 2023-09-22 PROCEDURE — 85027 COMPLETE CBC AUTOMATED: CPT

## 2023-09-22 PROCEDURE — 36415 COLL VENOUS BLD VENIPUNCTURE: CPT

## 2023-09-22 PROCEDURE — 99214 OFFICE O/P EST MOD 30 MIN: CPT | Performed by: NURSE PRACTITIONER

## 2023-09-22 RX ORDER — CETIRIZINE HYDROCHLORIDE 10 MG/1
10 TABLET ORAL DAILY
Qty: 90 TABLET | Refills: 3 | Status: SHIPPED | OUTPATIENT
Start: 2023-09-22 | End: 2024-07-24

## 2023-09-22 RX ORDER — CALCIUM CARBONATE 500 MG/1
2 TABLET, CHEWABLE ORAL 2 TIMES DAILY PRN
Qty: 180 TABLET | Refills: 3 | Status: SHIPPED | OUTPATIENT
Start: 2023-09-22

## 2023-09-22 RX ORDER — KETOCONAZOLE 20 MG/ML
SHAMPOO TOPICAL
Qty: 120 ML | Refills: 1 | Status: SHIPPED | OUTPATIENT
Start: 2023-09-22 | End: 2023-09-25

## 2023-09-22 ASSESSMENT — PAIN SCALES - GENERAL: PAINLEVEL: NO PAIN (0)

## 2023-09-22 NOTE — PROGRESS NOTES
Assessment & Plan     Callus of toe  Pared down with scalpel in clinic today. He tolerating well without complaint improvement of symptoms following removal of access skin.     Dark stools  Labs to be drawn today. Further evaluation warranted. If persistent and not associated with TUMS use recommend guac vs colonoscopy  - Comprehensive metabolic panel (BMP + Alb, Alk Phos, ALT, AST, Total. Bili, TP); Future  - CBC with platelets; Future    Constipation, unspecified constipation type  Improvement of bms recommended with fiber supplement. This should be taken daily.     Gastroesophageal reflux disease without esophagitis  Orders placed to use tums as needed.   - calcium carbonate (TUMS) 500 MG chewable tablet; Take 2 tablets (1,000 mg) by mouth 2 times daily as needed for heartburn    Tinea versicolor  Reoccurance of rash today. Plan on restarting use of ketoconazole shampoo   - ketoconazole (NIZORAL) 2 % external shampoo; Apply topically at least 15 min up to 8 hours prior to showering x 7 days. Repeat as needed for tinea versacolor    Seasonal allergic rhinitis due to pollen  Change medication to evening.   - cetirizine (ZYRTEC) 10 MG tablet; Take 1 tablet (10 mg) by mouth daily Take at bedtime       TIERRA Dickens CNP  [facility]   Ihsan is a 53 year old, presenting for the following health issues:  Foot Problems        9/22/2023    12:59 PM   Additional Questions   Roomed by Jada HOLLIDAY MA   Accompanied by Pola Romero       Following up on calluses on feet. Left foot is worse and more painful.   C/o of constipation and concern for blood in stool. Mentions dark urine as well.     He would like his prescription for cetirizine switched to at bedtime so it will be given appropriate at his group home.    Noticing worsening symptoms of his tinea versicolor again.  He would like prescription to be written out for him to be reminded to take this.      Review of  "Systems   Constitutional, HEENT, cardiovascular, pulmonary, gi and gu systems are negative, except as otherwise noted.      Objective    /82   Pulse 63   Temp 97.3  F (36.3  C) (Tympanic)   Resp 16   Ht 1.867 m (6' 1.5\")   Wt 85.3 kg (188 lb)   SpO2 96%   BMI 24.47 kg/m    Body mass index is 24.47 kg/m .  Physical Exam   GENERAL: healthy, alert and no distress  NECK: no adenopathy, no asymmetry, masses, or scars and thyroid normal to palpation  RESP: lungs clear to auscultation - no rales, rhonchi or wheezes  CV: regular rate and rhythm, normal S1 S2, no S3 or S4, no murmur, click or rub, no peripheral edema and peripheral pulses strong  MS: no gross musculoskeletal defects noted, no edema  SKIN: Corn callus present on bilateral lower extremities second toes at the end as well as a callus on the ball of his left foot.  Scalpel utilized to par down callus patient had improvement of symptoms following procedure.                      "

## 2023-09-22 NOTE — TELEPHONE ENCOUNTER
New script received today from Mayda Albarran CNP for ketaconazole shampoo, needing clarification.     When to apply this normally directions do not state to use 15 mins - 8 hrs prior to showering, it's a shampoo that is used while showering?  Is it a 7 day course then return to daily as PRN? Per pharmacy patient has been using this daily.    Message routed to Mayda Albarran CNP to clarify.    Julie Behrendt RN

## 2023-09-25 RX ORDER — KETOCONAZOLE 20 MG/ML
SHAMPOO TOPICAL
Qty: 120 ML | Refills: 1 | Status: SHIPPED | OUTPATIENT
Start: 2023-09-25

## 2023-10-05 ENCOUNTER — TELEPHONE (OUTPATIENT)
Dept: FAMILY MEDICINE | Facility: CLINIC | Age: 53
End: 2023-10-05
Payer: MEDICAID

## 2023-10-05 NOTE — TELEPHONE ENCOUNTER
Forms/Letter Request    Type of form/letter: Unemployment    Have you been seen for this request: No    Do we have the form/letter: Yes: form was brought over by [facility] home staff on 10/3    Who is the form from? Patient    Where did/will the form come from? Patient or family brought in       When is form/letter needed by: form needs to be turned in by tomorrow 10/6    How would you like the form/letter returned:     Patient Notified form requests are processed in 3-5 business days:Yes    Could we send this information to you in [employer] or would you prefer to receive a phone call?:   Patient would prefer a phone call   Okay to leave a detailed message?: Yes at [phone]

## 2023-10-05 NOTE — TELEPHONE ENCOUNTER
I did not get a form for this pt at my desk this week.    Nothing found in Form's Folders  Routed to provider to check with Mayda Albarran.

## 2023-10-10 ENCOUNTER — RADIOLOGY INJECTION OFFICE VISIT (OUTPATIENT)
Dept: PHYSICAL MEDICINE AND REHAB | Facility: CLINIC | Age: 53
End: 2023-10-10
Attending: PHYSICIAN ASSISTANT
Payer: COMMERCIAL

## 2023-10-10 VITALS
HEART RATE: 54 BPM | OXYGEN SATURATION: 98 % | RESPIRATION RATE: 16 BRPM | DIASTOLIC BLOOD PRESSURE: 70 MMHG | SYSTOLIC BLOOD PRESSURE: 116 MMHG | TEMPERATURE: 97.9 F

## 2023-10-10 DIAGNOSIS — M54.12 CERVICAL RADICULOPATHY: ICD-10-CM

## 2023-10-10 PROCEDURE — 64479 NJX AA&/STRD TFRM EPI C/T 1: CPT | Mod: LT | Performed by: PAIN MEDICINE

## 2023-10-10 RX ORDER — DEXAMETHASONE SODIUM PHOSPHATE 10 MG/ML
INJECTION, SOLUTION INTRAMUSCULAR; INTRAVENOUS
Status: COMPLETED | OUTPATIENT
Start: 2023-10-10 | End: 2023-10-10

## 2023-10-10 RX ORDER — LIDOCAINE HYDROCHLORIDE 10 MG/ML
INJECTION, SOLUTION EPIDURAL; INFILTRATION; INTRACAUDAL; PERINEURAL
Status: COMPLETED | OUTPATIENT
Start: 2023-10-10 | End: 2023-10-10

## 2023-10-10 RX ADMIN — DEXAMETHASONE SODIUM PHOSPHATE 10 MG: 10 INJECTION, SOLUTION INTRAMUSCULAR; INTRAVENOUS at 13:31

## 2023-10-10 RX ADMIN — LIDOCAINE HYDROCHLORIDE 2 ML: 10 INJECTION, SOLUTION EPIDURAL; INFILTRATION; INTRACAUDAL; PERINEURAL at 13:30

## 2023-10-10 ASSESSMENT — PAIN SCALES - GENERAL
PAINLEVEL: NO PAIN (0)
PAINLEVEL: SEVERE PAIN (6)

## 2023-10-10 NOTE — PATIENT INSTRUCTIONS
Follow-up visit with PRIYANK Rodriguez in 2 weeks to discuss injection outcome and determine care plan going forward.       DISCHARGE INSTRUCTIONS    During office hours (8:00 a.m.- 4:00 p.m.) questions or concerns may be answered  by calling Spine Center Navigation Nurses at  [phone].  Messages received after hours will be returned the following business day.      In the case of an emergency, please dial 911 or seek assistance at the nearest Emergency Room/Urgent Care facility.     All Patients:    You may experience an increase in your symptoms for the first 2 days (It may take anywhere between 2 days- 2 weeks for the steroid to have maximum effect).    You may use ice on the injection site, as frequently as 20 minutes each hour if needed.    You may take your pain medicine.    You may continue taking your regular medication after your injection. If you have had a Medial Branch Block you may resume pain medication once your pain diary is completed.    You may shower. No swimming, tub bath or hot tub for 48 hours.  You may remove your bandaid/bandage as soon as you are home.    You may resume light activities, as tolerated.    Resume your usual diet as tolerated.    It is strongly advised that you do not drive for 1-3 hours post injection.    If you have had oral sedation:  Do not drive for 8 hours post injection.      If you have had IV sedation:  Do not drive for 24 hours post injection.  Do not operate hazardous machinery or make important personal/business decisions for 24 hours.      POSSIBLE STEROID SIDE EFFECTS (If steroid/cortisone was used for your procedure)    -If you experience these symptoms, it should only last for a short period    Swelling of the legs              Skin redness (flushing)     Mouth (oral) irritation   Blood sugar (glucose) levels            Sweats                    Mood changes  Headache  Sleeplessness  Weakened immune system for up to 14 days, which could increase the risk of  prisca the COVID-19 virus and/or experiencing more severe symptoms of the disease, if exposed.  Decreased effectiveness of the flu vaccine if given within 2 weeks of the steroid.         POSSIBLE PROCEDURE SIDE EFFECTS  -Call the Spine Center if you are concerned  Increased Pain           Increased numbness/tingling      Nausea/Vomiting          Bruising/bleeding at site      Redness or swelling                                              Difficulty walking      Weakness           Fever greater than 100.5    *In the event of a severe headache after an epidural steroid injection that is relieved by lying down, please call the [facility] to speak with a clinical staff member*

## 2023-11-02 ENCOUNTER — TELEPHONE (OUTPATIENT)
Dept: FAMILY MEDICINE | Facility: CLINIC | Age: 53
End: 2023-11-02

## 2023-11-02 ENCOUNTER — OFFICE VISIT (OUTPATIENT)
Dept: FAMILY MEDICINE | Facility: CLINIC | Age: 53
End: 2023-11-02
Payer: COMMERCIAL

## 2023-11-02 VITALS
WEIGHT: 188 LBS | HEIGHT: 74 IN | DIASTOLIC BLOOD PRESSURE: 72 MMHG | OXYGEN SATURATION: 97 % | TEMPERATURE: 97.5 F | HEART RATE: 73 BPM | RESPIRATION RATE: 16 BRPM | BODY MASS INDEX: 24.13 KG/M2 | SYSTOLIC BLOOD PRESSURE: 130 MMHG

## 2023-11-02 DIAGNOSIS — H04.129 EYE DRYNESS: Primary | ICD-10-CM

## 2023-11-02 DIAGNOSIS — L84 CALLUS OF TOE: Primary | ICD-10-CM

## 2023-11-02 DIAGNOSIS — L84 CALLUS OF HEEL: ICD-10-CM

## 2023-11-02 DIAGNOSIS — H04.129 EYE DRYNESS: ICD-10-CM

## 2023-11-02 PROCEDURE — 99213 OFFICE O/P EST LOW 20 MIN: CPT | Performed by: NURSE PRACTITIONER

## 2023-11-02 RX ORDER — SOFT LENS RINSE,STORE SOLUTION
1 AEROSOL, SPRAY (ML) MISCELLANEOUS EVERY 4 HOURS PRN
Qty: 15 ML | Refills: 3 | Status: SHIPPED | OUTPATIENT
Start: 2023-11-02 | End: 2023-11-02

## 2023-11-02 RX ORDER — KETOTIFEN FUMARATE 0.35 MG/ML
1 SOLUTION/ DROPS OPHTHALMIC 2 TIMES DAILY PRN
Qty: 5 ML | Refills: 4 | Status: SHIPPED | OUTPATIENT
Start: 2023-11-02

## 2023-11-02 ASSESSMENT — PAIN SCALES - GENERAL: PAINLEVEL: NO PAIN (0)

## 2023-11-02 NOTE — TELEPHONE ENCOUNTER
Mayda Albarran:      Received a call from Jada at [facility] (call back was [phone]), the Saline solution you prescribed today for the B & L Sensitive Eyes is for contact lens rinse and not for eye dryness, wondering if you want to re-prescribe another product. Please advise,        JOSE FRANCISCO Torres     Med rec completed per patient at bedside.  Allergies reviewed with patient.  [facility]'s preferred [facility] [address].    Patient states that he was prescribed a Z-Perry 3~4 days ago but that he never started this medication. Patient is unsure what he was prescribed this antibiotic for. No other outpatient antibiotics within the last 30 days.

## 2023-11-02 NOTE — PROGRESS NOTES
"  Assessment & Plan     Callus of toe  Callus of heel  Pared down callous on bilateral 2nd toes and small callous on bottom of left foot. Heels addressed, encouraged soaking feet. Follow up in 3 weeks     Eye dryness  Okay to try saline to help with eye dryness. Follow up with eye doctor for vision exam.   - Soft Lens Products (B & L SENSITIVE EYES SALINE) 0.4 % SOLN; 1 drop every 4 hours as needed (for symptoms of eye strain or dryness)    Discussed asa should be taken daily at bedtime as prescribed.                  Mayda Albarran, TIERRA CNP  [facility]    Funmilayo Champagne is a 53 year old, presenting for the following health issues:  Foot Problems        11/2/2023    10:48 AM   Additional Questions   Roomed by Jada HOLLIDAY MA   Accompanied by daren Romero       Patient in clinic today to follow up on calluses on feet. Increased pain, has been getting the calloses pared down every month.     Would also like to discuss getting a prescription for aspirin PRN, also would like prescription/standing order for eye drops.    Review of Systems   Constitutional, HEENT, cardiovascular, pulmonary, gi and gu systems are negative, except as otherwise noted.      Objective    /72   Pulse 73   Temp 97.5  F (36.4  C) (Tympanic)   Resp 16   Ht 1.867 m (6' 1.5\")   Wt 85.3 kg (188 lb)   SpO2 97%   BMI 24.47 kg/m    Body mass index is 24.47 kg/m .  Physical Exam   GENERAL: healthy, alert and no distress  EYES: Eyes grossly normal to inspection, PERRL and conjunctivae and sclerae normal  SKIN: no suspicious lesions or rashes and callous on bilateral 2nd toes and left forefoot. Pared down with #10 scalpel                  "

## 2023-11-06 ENCOUNTER — TELEPHONE (OUTPATIENT)
Dept: NEUROSURGERY | Facility: CLINIC | Age: 53
End: 2023-11-06
Payer: MEDICAID

## 2023-11-06 DIAGNOSIS — M47.12 CERVICAL SPONDYLOSIS WITH MYELOPATHY: ICD-10-CM

## 2023-11-06 DIAGNOSIS — M54.2 NECK PAIN: ICD-10-CM

## 2023-11-06 DIAGNOSIS — M54.12 CERVICAL RADICULOPATHY: ICD-10-CM

## 2023-11-06 RX ORDER — GABAPENTIN 100 MG/1
CAPSULE ORAL
Qty: 270 CAPSULE | Refills: 0 | Status: SHIPPED | OUTPATIENT
Start: 2023-11-06 | End: 2024-09-09

## 2023-11-06 NOTE — TELEPHONE ENCOUNTER
"Routing refill request to provider for review/approval because:  Last signed by another provider.    Requested Prescriptions   Pending Prescriptions Disp Refills    acetaminophen (TYLENOL) 325 MG tablet 90 tablet 1     Sig: Take 3 tablets (975 mg) by mouth every 8 hours as needed for pain       Analgesics (Non-Narcotic Tylenol and ASA Only) Passed - 11/6/2023  1:05 PM        Passed - Recent (12 mo) or future (30 days) visit within the authorizing provider's specialty     Patient has had an office visit with the authorizing provider or a provider within the authorizing providers department within the previous 12 mos or has a future within next 30 days. See \"Patient Info\" tab in inbasket, or \"Choose Columns\" in Meds & Orders section of the refill encounter.              Passed - Patient is 7 months old or older     If patient is a peds patient of the age 7 mos -12 years, ok to refill using weight-based dosing.     If >3g daily and/or sig is not \"prn\", check for liver enzymes. If normal in the last year, ok to refill.  If not, refer to the provider.          Passed - Medication is active on med list                 "

## 2023-11-06 NOTE — TELEPHONE ENCOUNTER
Called and spoke with pt's nurse Brissa who inquired about evaluation of Ihsan's back pain during his appt on 11/10/2023.  Amberly Alberts, RN, CNRN

## 2023-11-06 NOTE — TELEPHONE ENCOUNTER
Who's calling: Nick (staff)  (Patient/ family calling on behalf of the patient)  On C2C: n/a  (Family/friends calling are they on patient Consent to Communicate form)   Reason for call: Nick is calling to let the care team that patient continues to have lower back pain, burns and its warm to the touch. Nick states that patient is unable to lay down properly do, and its causing the patients legs to go numb. Please advise   (Keep it simple but as detailed as to why the patient/family is calling)  Preferred Pharmacy: n/a  Call back #:[phone]  Providers name: Ruthann Aguiar

## 2023-11-07 RX ORDER — ACETAMINOPHEN 325 MG/1
975 TABLET ORAL EVERY 8 HOURS PRN
Qty: 90 TABLET | Refills: 1 | Status: SHIPPED | OUTPATIENT
Start: 2023-11-07 | End: 2024-08-21

## 2023-11-10 ENCOUNTER — VIRTUAL VISIT (OUTPATIENT)
Dept: NEUROSURGERY | Facility: CLINIC | Age: 53
End: 2023-11-10
Payer: COMMERCIAL

## 2023-11-10 DIAGNOSIS — M54.12 CERVICAL RADICULOPATHY: Primary | ICD-10-CM

## 2023-11-10 PROCEDURE — 99441 PR PHYSICIAN TELEPHONE EVALUATION 5-10 MIN: CPT | Mod: 93 | Performed by: PHYSICIAN ASSISTANT

## 2023-11-10 NOTE — PROGRESS NOTES
Ihsan is a 53 year old who is being evaluated via a billable telephone visit.      What phone number would you like to be contacted at? [phone]  How would you like to obtain your AVS? Perlita    Distant Location (provider location):  On-site  Distant Location (patient location): at home      A/P: status post C5-6 ACDF 5/6/2022 for cervical myelopathy with Dr Gage    [phone] The house Ask for Ihsan or Boris     Subjective   Ihsan is a 53 year old, presenting via telephone appointment after recent left C3-C4 TF AB on posterior cervical on 10/10/2023. He states that overall he is doing okay. He notes continued posterior cervical neck pain of which will extend to his left shoulder. He notes that following his injection that his pain has been better but is still present just not nearly as severe. He also notes that over the past 1-2 months his bilateral LE will become numb at random but does not limit his mobility. He denies any changes in bowel or bladder habits.       Review of Systems   Constitutional, HEENT, cardiovascular, pulmonary, gi and gu systems are negative, except as otherwise noted.      Objective           Vitals:  No vitals were obtained today due to virtual visit.    Physical Exam   healthy, alert, and no distress  PSYCH: Alert and oriented times 3; coherent speech, normal   rate and volume, able to articulate logical thoughts, able   to abstract reason, no tangential thoughts, no hallucinations   or delusions  His affect is normal  RESP: No cough, no audible wheezing, able to talk in full sentences  Remainder of exam unable to be completed due to telephone visits      Images reviewed   IMPRESSION:  1. Postsurgical changes of C5-C6 anterior fusion. No MRI findings of  hardware complication.  2. Multilevel cervical spondylosis most pronounced at C3-C4. No  high-grade spinal canal stenosis. No myelopathic cord signal.  3. Left lobe of thyroid nodule as seen on same-day CT. Suspect lesion  is  stable since 2022. Ultrasound can be helpful in further evaluation.    Plan: patient has been advised to remain as active as possible and attempt physical therapy and will plan to follow up in 6-8 weeks should symptoms fail to improve with continued conservative treatment.     Phone call duration: 10 minutes

## 2023-11-10 NOTE — PATIENT INSTRUCTIONS
patient has been advised to remain as active as possible and attempt physical therapy and will plan to follow up in 6-8 weeks should symptoms fail to improve with continued conservative treatment.

## 2023-11-10 NOTE — LETTER
11/10/2023         RE: Ihsan Marcus  [address] [place] [address]        Dear Colleague,    Thank you for referring your patient, Ihsan Marcus, to the [facility]UR[facility]ERY. Please see a copy of my visit note below.    Ihsan is a 53 year old who is being evaluated via a billable telephone visit.      What phone number would you like to be contacted at? [phone]  How would you like to obtain your AVS? MyChart    Distant Location (provider location):  On-site  Distant Location (patient location): at home      A/P: status post C5-6 ACDF 5/6/2022 for cervical myelopathy with Dr Gage    [phone] The house Ask for Ihsan or Boris Mello  Ihsan is a 53 year old, presenting via telephone appointment after recent left C3-C4 TF BA on posterior cervical on 10/10/2023. He states that overall he is doing okay. He notes continued posterior cervical neck pain of which will extend to his left shoulder. He notes that following his injection that his pain has been better but is still present just not nearly as severe. He also notes that over the past 1-2 months his bilateral LE will become numb at random but does not limit his mobility. He denies any changes in bowel or bladder habits.       Review of Systems   Constitutional, HEENT, cardiovascular, pulmonary, gi and gu systems are negative, except as otherwise noted.      Objective          Vitals:  No vitals were obtained today due to virtual visit.    Physical Exam   healthy, alert, and no distress  PSYCH: Alert and oriented times 3; coherent speech, normal   rate and volume, able to articulate logical thoughts, able   to abstract reason, no tangential thoughts, no hallucinations   or delusions  His affect is normal  RESP: No cough, no audible wheezing, able to talk in full sentences  Remainder of exam unable to be completed due to telephone visits      Images reviewed   IMPRESSION:  1. Postsurgical changes of C5-C6 anterior  fusion. No MRI findings of  hardware complication.  2. Multilevel cervical spondylosis most pronounced at C3-C4. No  high-grade spinal canal stenosis. No myelopathic cord signal.  3. Left lobe of thyroid nodule as seen on same-day CT. Suspect lesion  is stable since 2022. Ultrasound can be helpful in further evaluation.    Plan: patient has been advised to remain as active as possible and attempt physical therapy and will plan to follow up in 6-8 weeks should symptoms fail to improve with continued conservative treatment.     Phone call duration: 10 minutes         Again, thank you for allowing me to participate in the care of your patient.        Sincerely,        Ruthann Aguiar PA-C

## 2023-11-10 NOTE — NURSING NOTE
Ihsan is a 53 year old who is being evaluated via a billable telephone visit.      What phone number would you like to be contacted at? [phone]   How would you like to obtain your AVS? Perlita    Distant Location (provider location):  On-site  Phone call duration: 3 minutes

## 2023-11-13 ENCOUNTER — TELEPHONE (OUTPATIENT)
Dept: NEUROSURGERY | Facility: CLINIC | Age: 53
End: 2023-11-13

## 2023-11-13 ENCOUNTER — TELEPHONE (OUTPATIENT)
Dept: FAMILY MEDICINE | Facility: CLINIC | Age: 53
End: 2023-11-13
Payer: MEDICAID

## 2023-11-13 NOTE — TELEPHONE ENCOUNTER
Forms/Letter Request    Type of form/letter: Work - Pt's caregiver Brissa casiano.  She states that pt wants a letter from JASMIN Albarran that states that he can go back to work tomorrow with no restrictions.  Please call caregiver and advise.      Have you been seen for this request: No    Do we have the form/letter: No    When is form/letter needed by: ASAP    How would you like the form/letter returned:     Patient Notified form requests are processed in 3-5 business days:Yes    Could we send this information to you in [employer] or would you prefer to receive a phone call?:   Patient would prefer a phone call   Okay to leave a detailed message?: Yes at Home number on file [phone] (home)

## 2023-11-13 NOTE — TELEPHONE ENCOUNTER
Date: 11/13/2023  (Provide the date when patient was called)  Provider: IVÁN on Gage day   (with whom patient needs to schedule with)  Detailed message: Patient has [employer] insurance called patient and ldvm to call our billing dept to verify insurance at [phone].

## 2023-11-13 NOTE — TELEPHONE ENCOUNTER
Please see note below    Should this letter come from Dr. Aguiar? Cervical radiculopathy    Carolyn RN

## 2023-11-14 NOTE — TELEPHONE ENCOUNTER
11/14/2023 Spoke with caregiver Nick at her request sent letter to return to work for patient to email: [email or web address]

## 2023-11-30 ENCOUNTER — TELEPHONE (OUTPATIENT)
Dept: NEUROSURGERY | Facility: CLINIC | Age: 53
End: 2023-11-30
Payer: MEDICAID

## 2023-11-30 NOTE — TELEPHONE ENCOUNTER
Health Call Center    Phone Message    May a detailed message be left on voicemail: no     Reason for Call: During telephone visit on 11/10 was told he was to have scans done. Needs orders/referrals put in and c/b when done

## 2023-11-30 NOTE — TELEPHONE ENCOUNTER
Called and let the house nurse know Ihsan was advised to complete PT, there is no need for scans. Should push follow up out for him to actually attempt therapy.  Amberly Alberts RN, CNRN

## 2024-01-04 ENCOUNTER — OFFICE VISIT (OUTPATIENT)
Dept: FAMILY MEDICINE | Facility: CLINIC | Age: 54
End: 2024-01-04
Payer: COMMERCIAL

## 2024-01-04 VITALS
OXYGEN SATURATION: 98 % | HEIGHT: 74 IN | SYSTOLIC BLOOD PRESSURE: 136 MMHG | TEMPERATURE: 97 F | DIASTOLIC BLOOD PRESSURE: 82 MMHG | RESPIRATION RATE: 18 BRPM | HEART RATE: 63 BPM | WEIGHT: 175.6 LBS | BODY MASS INDEX: 22.54 KG/M2

## 2024-01-04 DIAGNOSIS — F20.9 SCHIZOPHRENIA, UNSPECIFIED TYPE (H): ICD-10-CM

## 2024-01-04 DIAGNOSIS — L84 CALLUS OF TOE: Primary | ICD-10-CM

## 2024-01-04 PROCEDURE — 99214 OFFICE O/P EST MOD 30 MIN: CPT | Performed by: NURSE PRACTITIONER

## 2024-01-04 ASSESSMENT — ASTHMA QUESTIONNAIRES
QUESTION_5 LAST FOUR WEEKS HOW WOULD YOU RATE YOUR ASTHMA CONTROL: WELL CONTROLLED
QUESTION_4 LAST FOUR WEEKS HOW OFTEN HAVE YOU USED YOUR RESCUE INHALER OR NEBULIZER MEDICATION (SUCH AS ALBUTEROL): ONCE A WEEK OR LESS
ACT_TOTALSCORE: 23
QUESTION_3 LAST FOUR WEEKS HOW OFTEN DID YOUR ASTHMA SYMPTOMS (WHEEZING, COUGHING, SHORTNESS OF BREATH, CHEST TIGHTNESS OR PAIN) WAKE YOU UP AT NIGHT OR EARLIER THAN USUAL IN THE MORNING: NOT AT ALL
QUESTION_1 LAST FOUR WEEKS HOW MUCH OF THE TIME DID YOUR ASTHMA KEEP YOU FROM GETTING AS MUCH DONE AT WORK, SCHOOL OR AT HOME: NONE OF THE TIME
ACT_TOTALSCORE: 23
QUESTION_2 LAST FOUR WEEKS HOW OFTEN HAVE YOU HAD SHORTNESS OF BREATH: NOT AT ALL

## 2024-01-04 ASSESSMENT — PAIN SCALES - GENERAL: PAINLEVEL: NO PAIN (0)

## 2024-01-04 NOTE — PROGRESS NOTES
"  Assessment & Plan     Callus of toe  Pared down with #11 scalpel. Tolerates this well. Improvement of callous discomfort following treatment per patient report.     Schizophrenia, unspecified type (H)  Continue to work with psych for medication management. Advised patient that his aspirin, rosuvastatin, and cetirizine are not causing increased hallucinations. I do not recommend stopping these medications.                  Mayda Albarran, TIERRA CNP  [facility]    Funmilayo Champagne is a 53 year old, presenting for the following health issues:  Follow Up        1/4/2024    11:14 AM   Additional Questions   Roomed by Jada HOLLIDAY MA   Accompanied by daren Romero       HPI     Following up on toe and heel calluses.     *Would like Aspirin, Rosuvastatin and Cetirizine discontinued. Has not been taking. Would like aspirin to be prn.    Discuss medication reactions with psych meds. Patient reports feeling more voices after he takes his aspirin, rosuvastatin, and sometimes the cetirizine.  He wants to know if these medications react with his psychiatric medications.    Current psychiatric medications include olanzapine.  Reassurance provided with patient that there is no side effects or contraindications on these medications.        Review of Systems   Constitutional, HEENT, cardiovascular, pulmonary, gi and gu systems are negative, except as otherwise noted.      Objective    /82   Pulse 63   Temp 97  F (36.1  C) (Tympanic)   Resp 18   Ht 1.867 m (6' 1.5\")   Wt 79.7 kg (175 lb 9.6 oz)   SpO2 98%   BMI 22.85 kg/m    Body mass index is 22.85 kg/m .  Physical Exam   GENERAL: healthy, alert and no distress  NECK: no adenopathy, no asymmetry, masses, or scars and thyroid normal to palpation  RESP: lungs clear to auscultation - no rales, rhonchi or wheezes  CV: regular rate and rhythm, normal S1 S2, no S3 or S4, no murmur, click or rub, no peripheral edema and peripheral pulses " strong  ABDOMEN: soft, nontender, no hepatosplenomegaly, no masses and bowel sounds normal  MS: no gross musculoskeletal defects noted, no edema

## 2024-03-11 ENCOUNTER — OFFICE VISIT (OUTPATIENT)
Dept: FAMILY MEDICINE | Facility: CLINIC | Age: 54
End: 2024-03-11
Payer: COMMERCIAL

## 2024-03-11 VITALS
RESPIRATION RATE: 16 BRPM | SYSTOLIC BLOOD PRESSURE: 128 MMHG | DIASTOLIC BLOOD PRESSURE: 70 MMHG | WEIGHT: 185 LBS | BODY MASS INDEX: 24.08 KG/M2 | TEMPERATURE: 97.1 F | HEART RATE: 62 BPM | OXYGEN SATURATION: 97 %

## 2024-03-11 DIAGNOSIS — L84 CALLUS OF HEEL: Primary | ICD-10-CM

## 2024-03-11 DIAGNOSIS — H04.129 EYE DRYNESS: ICD-10-CM

## 2024-03-11 DIAGNOSIS — L84 CALLUS OF TOE: ICD-10-CM

## 2024-03-11 PROCEDURE — 99213 OFFICE O/P EST LOW 20 MIN: CPT | Performed by: NURSE PRACTITIONER

## 2024-03-11 ASSESSMENT — PAIN SCALES - GENERAL: PAINLEVEL: NO PAIN (0)

## 2024-03-11 NOTE — PROGRESS NOTES
Assessment & Plan     Callus of heel  Callus of toe  Pared callous down with scalpel on toes and heels. Improvement of pain and symptoms of pressure when completed. Foot soaking recommended to help soften the skin.     Eye dryness  Okay to add in additional eye drop to help with symptoms of dryness.   - dextran 70-hypromellose (TEARS NATURALE FREE PF) 0.1-0.3 % ophthalmic solution; Place 1 drop into both eyes daily as needed (as needed for dry eyes)      Funmilayo Champagne is a 54 year old, presenting for the following health issues:  Foot Problems        3/11/2024     4:02 PM   Additional Questions   Roomed by Jada HOLLIDAY MA   Accompanied by Worker     HPI     Following up on toe and heel calluses.  Continues to have buildup of calluses on heels and second toes of bilateral feet.  These do cause pain for patient.  He has been wearing appropriate sized shoes but has since been wearing a little bit larger shoes that are too wide.  He is also been sleeping in a recliner with his shoes on.  His director of his group home did not know that this was occurring and plans on getting him a new bed.  He also complains of ongoing eye dryness he would like to increase the eyedrops that he has been using.          Review of Systems  Constitutional, HEENT, cardiovascular, pulmonary, gi and gu systems are negative, except as otherwise noted.      Objective    /70   Pulse 62   Temp 97.1  F (36.2  C) (Tympanic)   Resp 16   Wt 83.9 kg (185 lb)   SpO2 97%   BMI 24.08 kg/m    Body mass index is 24.08 kg/m .  Physical Exam   GENERAL: alert and no distress  SKIN: no suspicious lesions or rashes  -- Significant calluses present on bilateral second toes as well as bilateral heels.  Time spent on parring down calluses today in clinic to reduce pressure on his feet and improve symptoms of pain.  Patient had improvement of symptoms following procedure.       Signed Electronically by: TEIRRA Dickens CNP

## 2024-04-15 ENCOUNTER — TELEPHONE (OUTPATIENT)
Dept: FAMILY MEDICINE | Facility: CLINIC | Age: 54
End: 2024-04-15
Payer: COMMERCIAL

## 2024-04-15 DIAGNOSIS — M47.12 CERVICAL SPONDYLOSIS WITH MYELOPATHY: ICD-10-CM

## 2024-04-15 DIAGNOSIS — Z98.890 HX OF CERVICAL SPINE SURGERY: ICD-10-CM

## 2024-04-15 DIAGNOSIS — M54.12 CERVICAL RADICULOPATHY: Primary | ICD-10-CM

## 2024-04-15 NOTE — TELEPHONE ENCOUNTER
Order/Referral Request    Who is requesting:  Patient    Orders being requested:  MRI- for shoulder and legs going numb    Reason service is needed/diagnosis:  shoulder and legs going numb    When are orders needed by: ASAP    Has this been discussed with Provider: Yes Per patient Deion Ohara is aware of patient's issues    Does patient have a preference on a Group/Provider/Facility?  [facility] [place]    Does patient have an appointment scheduled?: No. Mayda had put in an order for this before back in August, however, there were some insurance issues and patient couldn't get the MRI done. Please re-submit order for MRI    Where to send orders: Place orders within Epic    Could we send this information to you in [employer] or would you prefer to receive a phone call?:   Patient would prefer a phone call   Okay to leave a detailed message?: Yes at Cell number on file:    Telephone Information:   Arthur  [phone]

## 2024-04-15 NOTE — TELEPHONE ENCOUNTER
It would be best if the order came from his previous neuro surgical team that he has followed up with.     I haven't discussed these current symptoms with patient for a number of months so an appointment for further evaluation to assure I am ordering the correct order will need to occur.     If he is having acutely changing symptoms being seen the ER is acceptable, (numb legs, weakness, change in neuro signs/ symptoms)    Mayda Albarran, TIERRA CNP

## 2024-04-16 NOTE — TELEPHONE ENCOUNTER
RN called and spoke to staff member Nick and informed her of the message below. Nick expressed understanding.    Nick reports they contacted the previous Neuro Team reports since Mayda Albarran placed the last MRI order that she should continue to be the provider to place MRI order for this.     Patient's insurance has changed and insurance requires a new MRI order because last one was too old.     Patient has upcoming appointment with Mayda Albarran 4/23/24.    Please advise.    Tonja Vang RN on 4/16/2024 at 10:41 AM

## 2024-04-23 ENCOUNTER — OFFICE VISIT (OUTPATIENT)
Dept: FAMILY MEDICINE | Facility: CLINIC | Age: 54
End: 2024-04-23
Payer: COMMERCIAL

## 2024-04-23 ENCOUNTER — ANCILLARY PROCEDURE (OUTPATIENT)
Dept: GENERAL RADIOLOGY | Facility: CLINIC | Age: 54
End: 2024-04-23
Attending: NURSE PRACTITIONER
Payer: COMMERCIAL

## 2024-04-23 VITALS
HEIGHT: 74 IN | SYSTOLIC BLOOD PRESSURE: 136 MMHG | OXYGEN SATURATION: 97 % | BODY MASS INDEX: 24 KG/M2 | DIASTOLIC BLOOD PRESSURE: 82 MMHG | TEMPERATURE: 97.6 F | WEIGHT: 187 LBS | HEART RATE: 68 BPM | RESPIRATION RATE: 16 BRPM

## 2024-04-23 DIAGNOSIS — R09.81 NASAL CONGESTION: ICD-10-CM

## 2024-04-23 DIAGNOSIS — R20.2 PARESTHESIA OF BILATERAL LEGS: ICD-10-CM

## 2024-04-23 DIAGNOSIS — T78.00XA ALLERGY WITH ANAPHYLAXIS DUE TO FOOD: ICD-10-CM

## 2024-04-23 DIAGNOSIS — L84 CALLUS OF TOE: Primary | ICD-10-CM

## 2024-04-23 DIAGNOSIS — M47.12 CERVICAL SPONDYLOSIS WITH MYELOPATHY: ICD-10-CM

## 2024-04-23 DIAGNOSIS — R29.898 LEG WEAKNESS, BILATERAL: ICD-10-CM

## 2024-04-23 DIAGNOSIS — M54.50 LUMBAR PAIN: ICD-10-CM

## 2024-04-23 DIAGNOSIS — M54.12 CERVICAL RADICULOPATHY: ICD-10-CM

## 2024-04-23 PROCEDURE — 72100 X-RAY EXAM L-S SPINE 2/3 VWS: CPT | Mod: TC | Performed by: RADIOLOGY

## 2024-04-23 PROCEDURE — 99214 OFFICE O/P EST MOD 30 MIN: CPT | Performed by: NURSE PRACTITIONER

## 2024-04-23 RX ORDER — DIPHENHYDRAMINE HCL 25 MG
25 TABLET ORAL EVERY 6 HOURS PRN
Qty: 30 TABLET | Refills: 0 | Status: SHIPPED | OUTPATIENT
Start: 2024-04-23 | End: 2024-09-23

## 2024-04-23 RX ORDER — AZELASTINE 1 MG/ML
1 SPRAY, METERED NASAL 2 TIMES DAILY
Qty: 30 ML | Refills: 5 | Status: SHIPPED | OUTPATIENT
Start: 2024-04-23

## 2024-04-23 ASSESSMENT — PAIN SCALES - GENERAL: PAINLEVEL: NO PAIN (0)

## 2024-04-23 NOTE — PROGRESS NOTES
Assessment & Plan     Callus of toe  Bilateral 2nd toe callous/ horns pared down with #11 scalpel. No complications. Improvement of pain following procedure.     Lumbar pain  Xray showing no significant arthritis or loss of joint space between vertebra. Further evaluation warranted with MRI given symptoms of bilateral lower extremity weakness and numbness.   - XR Lumbar Spine 2/3 Views; Future  - MR LUMBAR SPINE W/O & W CONTRAST; Future    Paresthesia of bilateral legs  See above.   - XR Lumbar Spine 2/3 Views; Future  - MR LUMBAR SPINE W/O & W CONTRAST; Future    Leg weakness, bilateral  See above  - XR Lumbar Spine 2/3 Views; Future  - MR LUMBAR SPINE W/O & W CONTRAST; Future    Cervical radiculopathy  History of surgery. Continues to be present with symptoms. Re-evaluation warranted with imaging. Last imaging was 9/2023.     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   - diphenhydrAMINE (BENADRYL) 25 MG tablet; Take 1 tablet (25 mg) by mouth every 6 hours as needed for itching or allergies    Nasal congestion  Okay to use nasal spray to help with nasal congestion.   - azelastine (ASTELIN) 0.1 % nasal spray; Spray 1 spray into both nostrils 2 times daily As needed for nasal congestion      Funmilayo Champagne is a 54 year old, presenting for the following health issues:  Foot Problems        4/23/2024     9:20 AM   Additional Questions   Roomed by Jada HOLLIDAY MA     *Has standing order sheet to fill out.   Add benadryl to standing orders to have on hand prn.    History of Present Illness       Reason for visit:  Feet    He eats 2-3 servings of fruits and vegetables daily.He consumes 5 sweetened beverage(s) daily.He exercises with enough effort to increase his heart rate 9 or less minutes per day.  He exercises with enough effort to increase his heart rate 5 days per week. He is missing 7 dose(s) of medications per week.  He  "is not taking prescribed medications regularly due to side effects.     Having neck pain that has been persistent. Post ACDF arm symptoms have not changed. Left side shoulder and arm pain. Right is sore/ stiff. Lumbar pain is present in the middle of his back.   Numbness in the leg left worse than right, he will have a hard time getting up and will feel weak. He will have a sensation of his legs giving out. He has not fallen. He feels like he will fall.     He has pain over his 2nd toes with is callouses. He would like these pared down again.     He continues to not take his rosuvastatin or his aspirin.  Educated patient on why these are prescribed.    He is requesting a nasal spray to help with allergy symptoms.  He also reports that he would like Benadryl as needed for symptoms of potential food allergy interactions.        Review of Systems  Constitutional, HEENT, cardiovascular, pulmonary, gi and gu systems are negative, except as otherwise noted.      Objective    /82   Pulse 68   Temp 97.6  F (36.4  C) (Tympanic)   Resp 16   Ht 1.867 m (6' 1.5\")   Wt 84.8 kg (187 lb)   SpO2 97%   BMI 24.34 kg/m    Body mass index is 24.34 kg/m .  Physical Exam   GENERAL: alert and no distress  NECK: no adenopathy, no asymmetry, masses, or scars  RESP: lungs clear to auscultation - no rales, rhonchi or wheezes  CV: regular rate and rhythm, normal S1 S2, no S3 or S4, no murmur, click or rub, no peripheral edema  ABDOMEN: soft, nontender, no hepatosplenomegaly, no masses and bowel sounds normal  MS: no gross musculoskeletal defects noted, no edema  SKIN: Bilateral end of second toes with callus/corn/horns present.  Able to par down with scalpel without complications.  Improvement of symptoms following.  PSYCH: concentration poor, tangential, affect normal/bright, judgement and insight impaired, and appearance well groomed    Lumbar Xray: I personally reviewed. Normal appearing spinal column without significant " arthritis present. Bowel gas patter within normal limits.         Signed Electronically by: TIERRA Dickens CNP

## 2024-05-06 ENCOUNTER — HOSPITAL ENCOUNTER (OUTPATIENT)
Dept: MRI IMAGING | Facility: CLINIC | Age: 54
Discharge: HOME | End: 2024-05-06
Attending: NURSE PRACTITIONER
Payer: COMMERCIAL

## 2024-05-06 DIAGNOSIS — R29.898 LEG WEAKNESS, BILATERAL: ICD-10-CM

## 2024-05-06 DIAGNOSIS — Z98.890 HX OF CERVICAL SPINE SURGERY: ICD-10-CM

## 2024-05-06 DIAGNOSIS — M54.50 LUMBAR PAIN: ICD-10-CM

## 2024-05-06 DIAGNOSIS — R20.2 PARESTHESIA OF BILATERAL LEGS: ICD-10-CM

## 2024-05-06 DIAGNOSIS — M47.12 CERVICAL SPONDYLOSIS WITH MYELOPATHY: ICD-10-CM

## 2024-05-06 DIAGNOSIS — M54.12 CERVICAL RADICULOPATHY: ICD-10-CM

## 2024-05-06 PROCEDURE — 72148 MRI LUMBAR SPINE W/O DYE: CPT

## 2024-05-06 PROCEDURE — 72141 MRI NECK SPINE W/O DYE: CPT

## 2024-05-07 DIAGNOSIS — J45.30 MILD PERSISTENT ASTHMA WITHOUT COMPLICATION: ICD-10-CM

## 2024-05-08 RX ORDER — ALBUTEROL SULFATE 90 UG/1
AEROSOL, METERED RESPIRATORY (INHALATION)
Qty: 18 G | Refills: 1 | Status: SHIPPED | OUTPATIENT
Start: 2024-05-08

## 2024-05-13 ENCOUNTER — OFFICE VISIT (OUTPATIENT)
Dept: FAMILY MEDICINE | Facility: CLINIC | Age: 54
End: 2024-05-13
Payer: COMMERCIAL

## 2024-05-13 VITALS
DIASTOLIC BLOOD PRESSURE: 76 MMHG | RESPIRATION RATE: 18 BRPM | HEART RATE: 58 BPM | OXYGEN SATURATION: 98 % | SYSTOLIC BLOOD PRESSURE: 132 MMHG | WEIGHT: 188.2 LBS | BODY MASS INDEX: 24.49 KG/M2 | TEMPERATURE: 97.3 F

## 2024-05-13 DIAGNOSIS — M54.12 CERVICAL RADICULOPATHY: ICD-10-CM

## 2024-05-13 DIAGNOSIS — H04.123 DRY EYES: ICD-10-CM

## 2024-05-13 DIAGNOSIS — M51.26 HERNIATED LUMBAR INTERVERTEBRAL DISC: ICD-10-CM

## 2024-05-13 DIAGNOSIS — M47.12 CERVICAL SPONDYLOSIS WITH MYELOPATHY: ICD-10-CM

## 2024-05-13 DIAGNOSIS — M54.50 LUMBAR PAIN: Primary | ICD-10-CM

## 2024-05-13 DIAGNOSIS — L84 CALLUS OF HEEL: ICD-10-CM

## 2024-05-13 PROCEDURE — 99213 OFFICE O/P EST LOW 20 MIN: CPT | Performed by: NURSE PRACTITIONER

## 2024-05-13 RX ORDER — CARBOXYMETHYLCELLULOSE SODIUM 5 MG/ML
1 SOLUTION/ DROPS OPHTHALMIC 3 TIMES DAILY PRN
Qty: 30 EACH | Refills: 4 | Status: SHIPPED | OUTPATIENT
Start: 2024-05-13

## 2024-05-13 ASSESSMENT — PAIN SCALES - GENERAL: PAINLEVEL: NO PAIN (0)

## 2024-05-13 NOTE — PROGRESS NOTES
Assessment & Plan       ICD-10-CM    1. Lumbar pain  M54.50 Spine  Referral      2. Herniated lumbar intervertebral disc  M51.26 Spine  Referral      3. Cervical radiculopathy  M54.12 Spine  Referral      4. Cervical spondylosis with myelopathy  M47.12 Spine  Referral      5. Dry eyes  H04.123 carboxymethylcellulose PF (GOODSENSE LUBRICATING EYE DROP) 0.5 % ophthalmic solution      6. Callus of heel  L84         Spine referral placed for further recommendations from recent cervical and lumbar spine MRIs. Overall doing well but has persistent pain and symptoms that would warrant further evaluation.     Dry eyes: lubricating drops prescription provided.     Callus of toes and heels pared down in clinic today.     Nicotine/Tobacco Cessation  He reports that he has been smoking cigarettes. He has a 46 pack-year smoking history. He has never used smokeless tobacco.  Nicotine/Tobacco Cessation Plan  Information offered: Patient not interested at this time        Subjective   Ihsan is a 54 year old, presenting for the following health issues:  Results and Foot Problems        5/13/2024     1:37 PM   Additional Questions   Roomed by HECTOR [place] here with Ihsan today from his emergency group home. He has been touring different group homes to move into. His guardian from [facility] just recently quit. He is needing to have a new guardian assigned through the courts.     History of Present Illness       Reason for visit:  Feet    He eats 2-3 servings of fruits and vegetables daily.He consumes 2 sweetened beverage(s) daily.He exercises with enough effort to increase his heart rate 9 or less minutes per day.  He exercises with enough effort to increase his heart rate 3 or less days per week. He is missing 7 dose(s) of medications per week.     ED/UC Followup:  Facility:  [facility]  Date of visit: 04/29/2024  Reason for visit: Chest pain  Current Status: Feeling  better      Atypical chest pain, symptoms have resolved. He has a history of TIA, hyperlipidemia, cervical spondylosis with myelopathy he is status post anterior cervical fusion.  He was seen at [facility] for symptoms.  Significant history of borderline intellectual functioning with schizophrenia, psychophysiologic insomnia, bipolar 1, delusional disorder, and drug dependence in remission.    *Still having pain in lower back-would like to discuss results from MRI today.   He ultimately would like to see a specialist. The pain and symptoms he is having causes him to have symptoms of weakness and unsteadiness. No incontinence. Neck is still bothersome for him. He has numbness/ tingling down the arm intermittently. No consistency in the symptoms but is daily.   If time available would like calloses pared down on feet.     Review of Systems  Constitutional, neuro, ENT, endocrine, pulmonary, cardiac, gastrointestinal, genitourinary, musculoskeletal, integument and psychiatric systems are negative, except as otherwise noted.      Objective    /76   Pulse 58   Temp 97.3  F (36.3  C) (Tympanic)   Resp 18   Wt 85.4 kg (188 lb 3.2 oz)   SpO2 98%   BMI 24.49 kg/m    Body mass index is 24.49 kg/m .    Physical Exam   GENERAL: alert and no distress  RESP: lungs clear to auscultation - no rales, rhonchi or wheezes  CV: regular rate and rhythm, normal S1 S2, no S3 or S4, no murmur, click or rub, no peripheral edema  ABDOMEN: soft, nontender, no hepatosplenomegaly, no masses and bowel sounds normal  SKIN: pared down callous of bilateral 2nd toes and heels.   PSYCH: at normal baseline mental status.            Signed Electronically by: TIERRA Dickens CNP

## 2024-05-14 ENCOUNTER — TELEPHONE (OUTPATIENT)
Dept: FAMILY MEDICINE | Facility: CLINIC | Age: 54
End: 2024-05-14
Payer: COMMERCIAL

## 2024-05-14 NOTE — TELEPHONE ENCOUNTER
Pharmacy would like to clarify that new eye gtt prescribed yesterday     carboxymethylcellulose    is to replace previous eye gtt      dextran 70-hypromellose   Alayna Louise RN on 5/14/2024 at 2:54 PM

## 2024-05-14 NOTE — TELEPHONE ENCOUNTER
Called pharmacy and we need to reach Paula the pharmacy for unit dose management.     Please call Paula back between 9 am - 4 pm to provide clarification.    Tonja Vang RN on 5/14/2024 at 5:10 PM

## 2024-05-15 NOTE — TELEPHONE ENCOUNTER
Called and informed Paula of the change of medication for patient to start carboxymethylcellulose.    Tonja Vang RN on 5/15/2024 at 10:47 AM

## 2024-06-04 ENCOUNTER — OFFICE VISIT (OUTPATIENT)
Dept: NEUROSURGERY | Facility: CLINIC | Age: 54
End: 2024-06-04
Attending: NURSE PRACTITIONER
Payer: COMMERCIAL

## 2024-06-04 VITALS
HEART RATE: 61 BPM | WEIGHT: 187 LBS | SYSTOLIC BLOOD PRESSURE: 139 MMHG | OXYGEN SATURATION: 97 % | BODY MASS INDEX: 24.78 KG/M2 | DIASTOLIC BLOOD PRESSURE: 74 MMHG | HEIGHT: 73 IN

## 2024-06-04 DIAGNOSIS — M54.12 CERVICAL RADICULOPATHY: Primary | ICD-10-CM

## 2024-06-04 DIAGNOSIS — M54.50 LUMBAR PAIN: ICD-10-CM

## 2024-06-04 DIAGNOSIS — M51.26 HERNIATED LUMBAR INTERVERTEBRAL DISC: ICD-10-CM

## 2024-06-04 DIAGNOSIS — M47.12 CERVICAL SPONDYLOSIS WITH MYELOPATHY: ICD-10-CM

## 2024-06-04 PROCEDURE — 99213 OFFICE O/P EST LOW 20 MIN: CPT | Performed by: PHYSICIAN ASSISTANT

## 2024-06-04 RX ORDER — ONDANSETRON 4 MG/1
4-8 TABLET, ORALLY DISINTEGRATING ORAL
COMMUNITY
Start: 2024-01-18

## 2024-06-04 ASSESSMENT — PAIN SCALES - GENERAL
PAINLEVEL: MODERATE PAIN (5)
PAINLEVEL: MODERATE PAIN (5)

## 2024-06-04 NOTE — PATIENT INSTRUCTIONS
Mr. Marcus has been advised to attempt conservative treatment and has been provided referral to spine center as well as physical therapy. He could benefit from another left C3-C4 TF BA. Discussed that should he exhaust conservative treatment could have him follow up with Dr. Gage for further surgical discussion of which he understands to contact the office. Follow up would otherwise be in 8 weeks. Should symptoms improve or resolve with conservative treatment could follow up as needed.

## 2024-06-04 NOTE — PROGRESS NOTES
"NEUROSURGERY FOLLOW UP  NOTE 6/4/2024     Mr. Marcus is a 54 year old male who comes today in follow-up for continued neck and left shoulder pain. He is status post Cervical 5 - Cervical 6 anterior cervical decompression and fusion with plate on 5/6/2022 by Dr. Gage. He notes that over the past few months his pain has progressed and worsened. He describes his pain as a sharp stabbing pain that begins in his neck and extends to his left shoulder. He had left C3-C4 TF BA on 10/10/2023 and noted good relief following the injection. He denies any radicular pain or numbness into his upper extremities. He also notes low back pain that he describes as a tightness and his group home aide states that on some days he is unable to stand straight up secondary to his back pain. He denies any radicular lower extremity pain numbness tingling or weakness. He states that he would be interested in another injection and to avoid surgery if possible.      PHYSICAL EXAM:   /74 (BP Location: Left arm, Patient Position: Sitting, Cuff Size: Adult Regular)   Pulse 61   Ht 1.854 m (6' 1\")   Wt 84.8 kg (187 lb)   SpO2 97%   BMI 24.67 kg/m       Mental Status: A & O in no acute distress.  Affect is appropriate.  Speech is fluent.  Recent and remote memory are intact.  Attention span and concentration are normal.     Motor:  Normal bulk and tone all muscle groups of all extremities.     Sensory: Sensation intact bilaterally to light touch throughout     Reflexes; knee/ ankle jerk 1+     Incision: well healed without erythema, induration, or drainage     IMAGING:   I personally reviewed all radiographic images                IMPRESSION:  1. Degenerative change of the lumbar spine as detailed above.  2. No high-grade spinal canal stenosis.  3. Moderate narrowing of the right lateral recess of the spinal canal  at L5-S1. No other significant lateral recess narrowing of the lumbar  spine.  4. No high-grade neural foraminal stenosis " at any level of the lumbar  spine.    IMPRESSION:  1. Postoperative and degenerative change of the cervical spine as  detailed above.  2. No high-grade spinal canal stenosis.  3. Moderate to severe neural foraminal stenosis bilaterally at C3-C4  and on the right at C4-C5.      CONSULTATION ASSESSMENT AND PLAN:    Mr. Marcus has been advised to attempt conservative treatment and has been provided referral to spine center as well as physical therapy. He could benefit from another left C3-C4 TF BA. Discussed that should he exhaust conservative treatment could have him follow up with Dr. Gage for further surgical discussion of which he understands to contact the office. Follow up would otherwise be in 8 weeks. Should symptoms improve or resolve with conservative treatment could follow up as needed.      Ruthann Aguiar PA-C  [facility]  O: [phone]

## 2024-06-04 NOTE — LETTER
"6/4/2024      Ihsan Marcus  [address]  [place] [address]      Dear Colleague,    Thank you for referring your patient, Ihsan Marcus, to the [facility]. Please see a copy of my visit note below.    NEUROSURGERY FOLLOW UP  NOTE 6/4/2024     Mr. Marcus is a 54 year old male who comes today in follow-up for continued neck and left shoulder pain. He is status post Cervical 5 - Cervical 6 anterior cervical decompression and fusion with plate on 5/6/2022 by Dr. Gage. He notes that over the past few months his pain has progressed and worsened. He describes his pain as a sharp stabbing pain that begins in his neck and extends to his left shoulder. He had left C3-C4 TF BA on 10/10/2023 and noted good relief following the injection. He denies any radicular pain or numbness into his upper extremities. He also notes low back pain that he describes as a tightness and his group home aide states that on some days he is unable to stand straight up secondary to his back pain. He denies any radicular lower extremity pain numbness tingling or weakness. He states that he would be interested in another injection and to avoid surgery if possible.      PHYSICAL EXAM:   /74 (BP Location: Left arm, Patient Position: Sitting, Cuff Size: Adult Regular)   Pulse 61   Ht 1.854 m (6' 1\")   Wt 84.8 kg (187 lb)   SpO2 97%   BMI 24.67 kg/m       Mental Status: A & O in no acute distress.  Affect is appropriate.  Speech is fluent.  Recent and remote memory are intact.  Attention span and concentration are normal.     Motor:  Normal bulk and tone all muscle groups of all extremities.     Sensory: Sensation intact bilaterally to light touch throughout     Reflexes; knee/ ankle jerk 1+     Incision: well healed without erythema, induration, or drainage     IMAGING:   I personally reviewed all radiographic images                IMPRESSION:  1. Degenerative change of the lumbar spine as detailed " above.  2. No high-grade spinal canal stenosis.  3. Moderate narrowing of the right lateral recess of the spinal canal  at L5-S1. No other significant lateral recess narrowing of the lumbar  spine.  4. No high-grade neural foraminal stenosis at any level of the lumbar  spine.    IMPRESSION:  1. Postoperative and degenerative change of the cervical spine as  detailed above.  2. No high-grade spinal canal stenosis.  3. Moderate to severe neural foraminal stenosis bilaterally at C3-C4  and on the right at C4-C5.      CONSULTATION ASSESSMENT AND PLAN:    Mr. Marcus has been advised to attempt conservative treatment and has been provided referral to spine center as well as physical therapy. He could benefit from another left C3-C4 TF BA. Discussed that should he exhaust conservative treatment could have him follow up with Dr. Gage for further surgical discussion of which he understands to contact the office. Follow up would otherwise be in 8 weeks. Should symptoms improve or resolve with conservative treatment could follow up as needed.      Ruthann Aguiar PA-C  [facility]  O: [phone]        Again, thank you for allowing me to participate in the care of your patient.        Sincerely,        Ruthann Aguiar PA-C

## 2024-06-06 DIAGNOSIS — G45.9 TRANSIENT ISCHEMIC ATTACK: ICD-10-CM

## 2024-06-06 DIAGNOSIS — E78.2 MIXED HYPERLIPIDEMIA: ICD-10-CM

## 2024-06-07 RX ORDER — ROSUVASTATIN CALCIUM 10 MG/1
TABLET, COATED ORAL
Qty: 30 TABLET | Refills: 0 | Status: SHIPPED | OUTPATIENT
Start: 2024-06-07 | End: 2024-07-03

## 2024-06-07 RX ORDER — ASPIRIN 81 MG/1
TABLET, CHEWABLE ORAL
Qty: 28 TABLET | Refills: 0 | Status: SHIPPED | OUTPATIENT
Start: 2024-06-07 | End: 2024-07-03

## 2024-06-11 ENCOUNTER — THERAPY VISIT (OUTPATIENT)
Dept: PHYSICAL THERAPY | Facility: CLINIC | Age: 54
End: 2024-06-11
Attending: PHYSICIAN ASSISTANT
Payer: COMMERCIAL

## 2024-06-11 DIAGNOSIS — M54.50 LUMBAR PAIN: ICD-10-CM

## 2024-06-11 DIAGNOSIS — M54.12 CERVICAL RADICULOPATHY: ICD-10-CM

## 2024-06-11 PROCEDURE — 97161 PT EVAL LOW COMPLEX 20 MIN: CPT | Mod: GP | Performed by: PHYSICAL THERAPIST

## 2024-06-11 PROCEDURE — 97110 THERAPEUTIC EXERCISES: CPT | Mod: GP | Performed by: PHYSICAL THERAPIST

## 2024-06-11 NOTE — PROGRESS NOTES
"PHYSICAL THERAPY EVALUATION  Type of Visit: Evaluation    See electronic medical record for Abuse and Falls Screening details.    Subjective   per chart review \"Mr. Marcus is a 54 year old male who comes today in follow-up for continued neck and left shoulder pain. He is status post Cervical 5 - Cervical 6 anterior cervical decompression and fusion with plate on 5/6/2022 by Dr. Gage. He notes that over the past few months his pain has progressed and worsened. He describes his pain as a sharp stabbing pain that begins in his neck and extends to his left shoulder. He had left C3-C4 TF BA on 10/10/2023 and noted good relief following the injection. He denies any radicular pain or numbness into his upper extremities. He also notes low back pain that he describes as a tightness and his group home aide states that on some days he is unable to stand straight up secondary to his back pain. He denies any radicular lower extremity pain numbness tingling or weakness. \"    Pain across the tops of both shoulders, in the neck along the spine and then along the spine in the lower back. Mostly wants to be seen for the lower back today.  Pain is there mostly during the day but doesn't keep him up at night.  Walking seems to make the back pain worse.  Taking tylenol and IB profen seems to help give some relief.  Numbness or tingling down the legs sometimes and sometimes feels weakness in his legs. Difficult to get out of chair at times due to weakness in the legs.         Presenting condition or subjective complaint: lower back  Date of onset:      Relevant medical history: Depression; Mental Illness; Neck injury; Smoking; Stroke   Dates & types of surgery: 2021    Prior diagnostic imaging/testing results: MRI     IMPRESSION:  1. Degenerative change of the lumbar spine as detailed above.  2. No high-grade spinal canal stenosis.  3. Moderate narrowing of the right lateral recess of the spinal canal  at L5-S1. No other significant " lateral recess narrowing of the lumbar  spine.  4. No high-grade neural foraminal stenosis at any level of the lumbar  spine.     IMPRESSION:  1. Postoperative and degenerative change of the cervical spine as  detailed above.  2. No high-grade spinal canal stenosis.  3. Moderate to severe neural foraminal stenosis bilaterally at C3-C4  and on the right at C4-C5.  Prior therapy history for the same diagnosis, illness or injury:        Prior Level of Function  Transfers:   Ambulation:   ADL:   IADL:     Living Environment  Social support: With a caregiver/helper   Type of home: House   Stairs to enter the home: No       Ramp: No   Stairs inside the home: No       Help at home: Medication and/or finances  Equipment owned:       Employment: No    Hobbies/Interests: smoking,walking    Patient goals for therapy: walk more    Pain assessment:      Objective   LUMBAR SPINE EVALUATION  PAIN: Pain Level at Rest: 6/10  Pain Level with Use: 9/10  Pain Location: cervical spine and lumbar spine  Pain Quality: Aching, Burning, and Throbbing  Pain Frequency: intermittent  Pain is Worst: daytime  Pain is Exacerbated By: walking, standing, using arms   Pain is Relieved By: NSAIDs and rest  Pain Progression: Unchanged  LUMBAR SPINE EVALUATION  INTEGUMENTARY (edema, incisions):   POSTURE: Sitting Posture: Rounded shoulders, Forward head, Lordosis decreased, Thoracic kyphosis increased  GAIT: forward flexed, downward gaze,   BALANCE/PROPRIOCEPTION: SL Balance  R     L    WEIGHTBEARING ALIGNMENT:   Squat form:   Sit to stand transition:   NON-WEIGHTBEARING ALIGNMENT: ASIS alignment:   ROM:   Cervical rotation: 50 B  with pain in both sides of neck with end range   Cervical flexion: 20 degrees with pain in back of neck   Cervical extension: 30 degees with pain in back of neck   Cervical SB: 15 B with soreness in both sides of neck   UE mobility WnL   Thoracic mobility: rotation 50% limited B   Lumbar Flexion:  Finger tips 2 inches from  ground, increase in pain  Lumbar extension: 75% limited, decrease in pain   Lumbar SB     Right: finger tips 6 inches from knee joint line, increase in pain   Left: finger tips 6 inches from knee joint line, increase in pain   Hip Flexion:  WNL  Hip Extension:   Knee Flexion: WNL  Knee extension:  WNL   STRENGTH/MYOTOMES:   Shoulder flexion: 5/5 B   Shoulder ABD: 4+/5 B   Shoulder ER 4+/5 B      Left Right   T12-L3 (Hip Flexion) 4 4   (Hip Abduction)     Hip Extension     L2-4 (Quads)  4+ 4+   (Knee flexion) 4+ 4+   L4 (Ankle DF) 4+ 4+   L5 (Great Toe Ext)     S1 (Toe Raise)       FLEXIBILITY:    Hamstrin/90   R 60 from 0   L 60 from 0    Illiopsoas mod tightness B   LUMBAR/HIP Special Tests:    Left Right   Amrit Positive Positive             Assessment & Plan   CLINICAL IMPRESSIONS  Medical Diagnosis: Lumbar pain (M54.50), Cervical radiculopathy (M54.12)    Treatment Diagnosis: neck pain and low back pain   Impression/Assessment: Patient is a 54 year old male with low back and neck pain complaints.  Signs and symptoms consistent with muscle tightness in hip flexors, weakness in LE and core and decreased mobility in thoracic and cervical spine. The following significant findings have been identified: Pain, Decreased ROM/flexibility, Decreased joint mobility, and Decreased strength. These impairments interfere with their ability to perform self care tasks, recreational activities, and household chores as compared to previous level of function.     Clinical Decision Making (Complexity):  Clinical Presentation: Stable/Uncomplicated  Clinical Presentation Rationale: based on medical and personal factors listed in PT evaluation  Clinical Decision Making (Complexity): Low complexity    PLAN OF CARE  Treatment Interventions:  Interventions: Gait Training, Manual Therapy, Neuromuscular Re-education, Therapeutic Activity, Therapeutic Exercise, Self-Care/Home Management    Long Term Goals     PT Goal 1  Goal  Identifier: 1  Goal Description: Patient will improve cervical rotation to 60 B with no neck pain in order to look over his shoulder and turn to talk to people.  Target Date: 07/09/24  PT Goal 2  Goal Identifier: 2  Goal Description: Patient will be independent in HEP in order to improve strength, ROM and pain outside of PT.  Target Date: 07/09/24  PT Goal 3  Goal Identifier: 3  Goal Description: Patient will be able to stand up straight for 5+ minutes with back pain below 6/10 in order to complete ADL chores.  Target Date: 08/06/24  PT Goal 4  Goal Identifier: 4  Goal Description: Patient will improve hip extension to 0 B in order to improve hip flexor flexibility for walking and standing tasks with less pain.  Target Date: 08/06/24      Frequency of Treatment: 1x/week  Duration of Treatment: 8 weeks    Recommended Referrals to Other Professionals:   Education Assessment:   Learner/Method: Patient  Education Comments: educated on role of PT, POC and HEP    Risks and benefits of evaluation/treatment have been explained.   Patient/Family/caregiver agrees with Plan of Care.     Evaluation Time:     PT Eval, Low Complexity Minutes (20218): 20       Signing Clinician: Dahlia Bolton, PT      [facility]                                                                                   OUTPATIENT PHYSICAL THERAPY      PLAN OF TREATMENT FOR OUTPATIENT REHABILITATION   Patient's Last Name, First Name, Ihsan Stoddard YOB: 1970   Provider's Name   [facility]   Medical Record No.  [id number]     Onset Date:    Start of Care Date: 06/11/24     Medical Diagnosis:  Lumbar pain (M54.50), Cervical radiculopathy (M54.12)      PT Treatment Diagnosis:  neck pain and low back pain Plan of Treatment  Frequency/Duration: 1x/week/ 8 weeks    Certification date from 06/11/24 to 08/06/24         See note for plan of treatment details and functional goals      Dahlia Bolton, PT                         I CERTIFY THE NEED FOR THESE SERVICES FURNISHED UNDER        THIS PLAN OF TREATMENT AND WHILE UNDER MY CARE     (Physician attestation of this document indicates review and certification of the therapy plan).              Referring Provider:  Ruthann Aguiar    Initial Assessment  See Epic Evaluation- Start of Care Date: 06/11/24

## 2024-06-18 ENCOUNTER — THERAPY VISIT (OUTPATIENT)
Dept: PHYSICAL THERAPY | Facility: CLINIC | Age: 54
End: 2024-06-18
Attending: PHYSICIAN ASSISTANT
Payer: COMMERCIAL

## 2024-06-18 DIAGNOSIS — M54.50 LUMBAR PAIN: Primary | ICD-10-CM

## 2024-06-18 DIAGNOSIS — M54.12 CERVICAL RADICULOPATHY: ICD-10-CM

## 2024-06-18 PROCEDURE — 97110 THERAPEUTIC EXERCISES: CPT | Mod: GP | Performed by: PHYSICAL THERAPIST

## 2024-06-27 ENCOUNTER — THERAPY VISIT (OUTPATIENT)
Dept: PHYSICAL THERAPY | Facility: CLINIC | Age: 54
End: 2024-06-27
Attending: PHYSICIAN ASSISTANT
Payer: COMMERCIAL

## 2024-06-27 DIAGNOSIS — M54.50 LUMBAR PAIN: Primary | ICD-10-CM

## 2024-06-27 DIAGNOSIS — M54.12 CERVICAL RADICULOPATHY: ICD-10-CM

## 2024-06-27 PROCEDURE — 97110 THERAPEUTIC EXERCISES: CPT | Mod: GP | Performed by: PHYSICAL THERAPIST

## 2024-07-02 ENCOUNTER — THERAPY VISIT (OUTPATIENT)
Dept: PHYSICAL THERAPY | Facility: CLINIC | Age: 54
End: 2024-07-02
Attending: PHYSICIAN ASSISTANT
Payer: COMMERCIAL

## 2024-07-02 DIAGNOSIS — M54.50 LUMBAR PAIN: Primary | ICD-10-CM

## 2024-07-02 DIAGNOSIS — M54.12 CERVICAL RADICULOPATHY: ICD-10-CM

## 2024-07-02 PROCEDURE — 97110 THERAPEUTIC EXERCISES: CPT | Mod: GP | Performed by: PHYSICAL THERAPIST

## 2024-07-03 DIAGNOSIS — G45.9 TRANSIENT ISCHEMIC ATTACK: ICD-10-CM

## 2024-07-03 DIAGNOSIS — E78.2 MIXED HYPERLIPIDEMIA: ICD-10-CM

## 2024-07-05 ENCOUNTER — TELEPHONE (OUTPATIENT)
Dept: PHYSICAL MEDICINE AND REHAB | Facility: CLINIC | Age: 54
End: 2024-07-05

## 2024-07-05 RX ORDER — ROSUVASTATIN CALCIUM 10 MG/1
TABLET, COATED ORAL
Qty: 30 TABLET | Refills: 0 | Status: SHIPPED | OUTPATIENT
Start: 2024-07-05 | End: 2024-07-24

## 2024-07-05 RX ORDER — ASPIRIN 81 MG/1
TABLET, CHEWABLE ORAL
Qty: 28 TABLET | Refills: 0 | Status: SHIPPED | OUTPATIENT
Start: 2024-07-05 | End: 2024-07-24

## 2024-07-05 NOTE — TELEPHONE ENCOUNTER
Who's calling:   other             Family/Other name:  Rosaura iyer    On C2C: yes or No: Yes       Providers name/other:    Dr. Adriel Adrian DO  Reason for call:  OTHER    Detailed Message: Calling for consent for patient's injection on 7/9/24, please reach out by calling the number provided below or email. [email or web address]      Call back #: [phone]  Preferred Pharmacy:

## 2024-07-09 ENCOUNTER — RADIOLOGY INJECTION OFFICE VISIT (OUTPATIENT)
Dept: PHYSICAL MEDICINE AND REHAB | Facility: CLINIC | Age: 54
End: 2024-07-09
Attending: PHYSICIAN ASSISTANT
Payer: COMMERCIAL

## 2024-07-09 VITALS
DIASTOLIC BLOOD PRESSURE: 68 MMHG | SYSTOLIC BLOOD PRESSURE: 144 MMHG | RESPIRATION RATE: 16 BRPM | OXYGEN SATURATION: 98 % | TEMPERATURE: 97.3 F | HEART RATE: 62 BPM

## 2024-07-09 DIAGNOSIS — M54.12 CERVICAL RADICULOPATHY: ICD-10-CM

## 2024-07-09 PROCEDURE — 64479 NJX AA&/STRD TFRM EPI C/T 1: CPT | Mod: LT | Performed by: PAIN MEDICINE

## 2024-07-09 RX ORDER — LIDOCAINE HYDROCHLORIDE 10 MG/ML
INJECTION, SOLUTION EPIDURAL; INFILTRATION; INTRACAUDAL; PERINEURAL
Status: COMPLETED | OUTPATIENT
Start: 2024-07-09 | End: 2024-07-09

## 2024-07-09 RX ORDER — DEXAMETHASONE SODIUM PHOSPHATE 10 MG/ML
INJECTION, SOLUTION INTRAMUSCULAR; INTRAVENOUS
Status: COMPLETED | OUTPATIENT
Start: 2024-07-09 | End: 2024-07-09

## 2024-07-09 RX ADMIN — LIDOCAINE HYDROCHLORIDE 2 ML: 10 INJECTION, SOLUTION EPIDURAL; INFILTRATION; INTRACAUDAL; PERINEURAL at 15:18

## 2024-07-09 RX ADMIN — DEXAMETHASONE SODIUM PHOSPHATE 10 MG: 10 INJECTION, SOLUTION INTRAMUSCULAR; INTRAVENOUS at 15:19

## 2024-07-09 ASSESSMENT — PAIN SCALES - GENERAL
PAINLEVEL: MILD PAIN (3)
PAINLEVEL: NO PAIN (0)

## 2024-07-09 NOTE — PATIENT INSTRUCTIONS
DISCHARGE INSTRUCTIONS    During office hours (8:00 a.m.- 4:00 p.m.) questions or concerns may be answered  by calling [facility] Navigation Nurses at  [phone].  Messages received after hours will be returned the following business day.      In the case of an emergency, please dial 911 or seek assistance at the nearest Emergency Room/Urgent Care facility.     All Patients:    You may experience an increase in your symptoms for the first 2 days (It may take anywhere between 2 days- 2 weeks for the steroid to have maximum effect).    You may use ice on the injection site, as frequently as 20 minutes each hour if needed.    You may take your pain medicine.    You may continue taking your regular medication after your injection. If you have had a Medial Branch Block you may resume pain medication once your pain diary is completed.    You may shower. No swimming, tub bath or hot tub for 48 hours.  You may remove your bandaid/bandage as soon as you are home.    You may resume light activities, as tolerated.    Resume your usual diet as tolerated.    It is strongly advised that you do not drive for 1-3 hours post injection.    If you have had oral sedation:  Do not drive for 8 hours post injection.      If you have had IV sedation:  Do not drive for 24 hours post injection.  Do not operate hazardous machinery or make important personal/business decisions for 24 hours.      POSSIBLE STEROID SIDE EFFECTS (If steroid/cortisone was used for your procedure)    -If you experience these symptoms, it should only last for a short period    Swelling of the legs              Skin redness (flushing)     Mouth (oral) irritation   Blood sugar (glucose) levels            Sweats                    Mood changes  Headache  Sleeplessness  Weakened immune system for up to 14 days, which could increase the risk of prisca the COVID-19 virus and/or experiencing more severe symptoms of the disease, if exposed.  Decreased  effectiveness of the flu vaccine if given within 2 weeks of the steroid.         POSSIBLE PROCEDURE SIDE EFFECTS  -Call the Spine Center if you are concerned  Increased Pain           Increased numbness/tingling      Nausea/Vomiting          Bruising/bleeding at site      Redness or swelling                                              Difficulty walking      Weakness           Fever greater than 100.5    *In the event of a severe headache after an epidural steroid injection that is relieved by lying down, please call the [facility] to speak with a clinical staff member*

## 2024-07-10 ENCOUNTER — THERAPY VISIT (OUTPATIENT)
Dept: PHYSICAL THERAPY | Facility: CLINIC | Age: 54
End: 2024-07-10
Attending: PHYSICIAN ASSISTANT
Payer: COMMERCIAL

## 2024-07-10 DIAGNOSIS — M54.12 CERVICAL RADICULOPATHY: ICD-10-CM

## 2024-07-10 DIAGNOSIS — M54.50 LUMBAR PAIN: Primary | ICD-10-CM

## 2024-07-10 PROCEDURE — 999N000104 HC STATISTIC NO CHARGE: Performed by: PHYSICAL THERAPIST

## 2024-07-17 ENCOUNTER — THERAPY VISIT (OUTPATIENT)
Dept: PHYSICAL THERAPY | Facility: CLINIC | Age: 54
End: 2024-07-17
Attending: PHYSICIAN ASSISTANT
Payer: COMMERCIAL

## 2024-07-17 DIAGNOSIS — M54.12 CERVICAL RADICULOPATHY: ICD-10-CM

## 2024-07-17 DIAGNOSIS — M54.50 LUMBAR PAIN: Primary | ICD-10-CM

## 2024-07-17 PROCEDURE — 97110 THERAPEUTIC EXERCISES: CPT | Mod: GP | Performed by: PHYSICAL THERAPIST

## 2024-07-18 ENCOUNTER — OFFICE VISIT (OUTPATIENT)
Dept: FAMILY MEDICINE | Facility: CLINIC | Age: 54
End: 2024-07-18
Payer: COMMERCIAL

## 2024-07-18 VITALS
DIASTOLIC BLOOD PRESSURE: 78 MMHG | SYSTOLIC BLOOD PRESSURE: 130 MMHG | RESPIRATION RATE: 14 BRPM | HEART RATE: 70 BPM | BODY MASS INDEX: 22.53 KG/M2 | HEIGHT: 73 IN | WEIGHT: 170 LBS | OXYGEN SATURATION: 97 % | TEMPERATURE: 96.9 F

## 2024-07-18 DIAGNOSIS — K59.01 SLOW TRANSIT CONSTIPATION: ICD-10-CM

## 2024-07-18 DIAGNOSIS — K21.9 GASTROESOPHAGEAL REFLUX DISEASE WITHOUT ESOPHAGITIS: ICD-10-CM

## 2024-07-18 DIAGNOSIS — L84 CALLUS OF TOE: Primary | ICD-10-CM

## 2024-07-18 PROCEDURE — 99214 OFFICE O/P EST MOD 30 MIN: CPT | Performed by: NURSE PRACTITIONER

## 2024-07-18 RX ORDER — FAMOTIDINE 20 MG/1
20 TABLET, FILM COATED ORAL 2 TIMES DAILY PRN
Qty: 180 TABLET | Refills: 3 | Status: SHIPPED | OUTPATIENT
Start: 2024-07-18

## 2024-07-18 RX ORDER — AMOXICILLIN 250 MG
2 CAPSULE ORAL DAILY PRN
Qty: 30 TABLET | Refills: 2 | Status: SHIPPED | OUTPATIENT
Start: 2024-07-18

## 2024-07-18 ASSESSMENT — ASTHMA QUESTIONNAIRES
QUESTION_3 LAST FOUR WEEKS HOW OFTEN DID YOUR ASTHMA SYMPTOMS (WHEEZING, COUGHING, SHORTNESS OF BREATH, CHEST TIGHTNESS OR PAIN) WAKE YOU UP AT NIGHT OR EARLIER THAN USUAL IN THE MORNING: NOT AT ALL
QUESTION_2 LAST FOUR WEEKS HOW OFTEN HAVE YOU HAD SHORTNESS OF BREATH: NOT AT ALL
ACT_TOTALSCORE: 25
QUESTION_4 LAST FOUR WEEKS HOW OFTEN HAVE YOU USED YOUR RESCUE INHALER OR NEBULIZER MEDICATION (SUCH AS ALBUTEROL): NOT AT ALL
QUESTION_5 LAST FOUR WEEKS HOW WOULD YOU RATE YOUR ASTHMA CONTROL: COMPLETELY CONTROLLED
ACT_TOTALSCORE: 25
QUESTION_1 LAST FOUR WEEKS HOW MUCH OF THE TIME DID YOUR ASTHMA KEEP YOU FROM GETTING AS MUCH DONE AT WORK, SCHOOL OR AT HOME: NONE OF THE TIME

## 2024-07-18 ASSESSMENT — PAIN SCALES - GENERAL: PAINLEVEL: NO PAIN (0)

## 2024-07-18 NOTE — PROGRESS NOTES
Assessment & Plan     Callus of toe  Bilateral second toe calluses parred down with a #15 scalpel and small callus on bottom of left foot on the sole of the ball parred down as well.  Immediate relief from pressure from these calluses expressed by patient after removal of excess tissue.    Slow transit constipation  Increase senna dose from 1 tab to 2 tabs daily as needed  - senna-docusate (SENOKOT-S/PERICOLACE) 8.6-50 MG tablet; Take 2 tablets by mouth daily as needed for constipation    Gastroesophageal reflux disease without esophagitis  Restart famotidine twice daily for symptoms of reflux.  - famotidine (PEPCID) 20 MG tablet; Take 1 tablet (20 mg) by mouth 2 times daily as needed (for symptoms of reflux)    Subjective   DEEPTHI is a 54 year old, presenting for the following health issues:  Foot Burn  History of Present Illness       Reason for visit:  Pain in toes    He eats 0-1 servings of fruits and vegetables daily.He consumes 11 or more sweetened beverage(s) daily.He exercises with enough effort to increase his heart rate 9 or less minutes per day.  He exercises with enough effort to increase his heart rate 3 or less days per week. He is missing 7 dose(s) of medications per week.     Callouses have been bothersome. Would like them scrapped down.  We have done this numerous times in the past he has good resolution of symptoms when he has this completed.    Additionally he has concerns regarding reflux symptoms as well as constipation.  He would like to increase the dose of his Senokot to help with stool regularity.  He also mentions that he is having regular esophageal reflux symptoms that are bothersome.  He was previously on famotidine.  He is not sure if he should restart that or if he should do a different medication.  He denies any chest pain shortness of breath or difficulty breathing.          Review of Systems  Constitutional, HEENT, cardiovascular, pulmonary, gi and gu systems are negative, except as  "otherwise noted.      Objective    /78 (BP Location: Right arm, Patient Position: Sitting, Cuff Size: Adult Large)   Pulse 70   Temp 96.9  F (36.1  C) (Tympanic)   Resp 14   Ht 1.854 m (6' 1\")   Wt 77.1 kg (170 lb)   SpO2 97%   BMI 22.43 kg/m    Body mass index is 22.43 kg/m .  Physical Exam   GENERAL: alert and no distress  SKIN: Calluses on bilateral second toes at the end.  Parred down with #15 scalpel until relief noted by patient.  Small callus on bottom of left foot removal improves symptoms.  This is a dark area on his foot.            Signed Electronically by: TIERRA Dickens CNP    "

## 2024-07-24 DIAGNOSIS — J30.1 SEASONAL ALLERGIC RHINITIS DUE TO POLLEN: ICD-10-CM

## 2024-07-24 DIAGNOSIS — E78.2 MIXED HYPERLIPIDEMIA: ICD-10-CM

## 2024-07-24 DIAGNOSIS — G45.9 TRANSIENT ISCHEMIC ATTACK: ICD-10-CM

## 2024-07-24 NOTE — TELEPHONE ENCOUNTER
aspirin (ASPIRIN LOW DOSE) 81 MG chewable tablet   rosuvastatin (CRESTOR) 10 MG tablet     cetirizine (ZYRTEC) 10 MG tablet

## 2024-07-25 RX ORDER — CETIRIZINE HYDROCHLORIDE 10 MG/1
10 TABLET ORAL DAILY
Qty: 90 TABLET | Refills: 1 | Status: SHIPPED | OUTPATIENT
Start: 2024-07-25

## 2024-07-25 RX ORDER — ASPIRIN 81 MG/1
TABLET, CHEWABLE ORAL
Qty: 28 TABLET | Refills: 5 | Status: SHIPPED | OUTPATIENT
Start: 2024-07-25 | End: 2024-08-12

## 2024-07-25 RX ORDER — ROSUVASTATIN CALCIUM 10 MG/1
TABLET, COATED ORAL
Qty: 90 TABLET | Refills: 3 | Status: SHIPPED | OUTPATIENT
Start: 2024-07-25

## 2024-07-25 RX ORDER — ROSUVASTATIN CALCIUM 10 MG/1
TABLET, COATED ORAL
Qty: 30 TABLET | Refills: 0 | Status: SHIPPED | OUTPATIENT
Start: 2024-07-25 | End: 2024-07-25

## 2024-07-25 NOTE — TELEPHONE ENCOUNTER
Pending Prescriptions:                       Disp   Refills    rosuvastatin (CRESTOR) 10 MG tablet       30 tab*0            Sig: Take 1 tab every day in the evening.    Signed Prescriptions:                        Disp   Refills    aspirin (ASPIRIN LOW DOSE) 81 MG chewable *28 tab*5        Sig: Take 1 tablet daily in the evening  Authorizing Provider: RUBIN BORJAS  Ordering User: SHLOMO NORTON    cetirizine (ZYRTEC) 10 MG tablet           90 tab*1        Sig: Take 1 tablet (10 mg) by mouth daily Take at bedtime  Authorizing Provider: RUBIN BORJAS  Ordering User: SHLOMO NORTON    Routing refill request to provider for review/approval because:  LDL Cholesterol Calculated   Date Value Ref Range Status   12/30/2022 104 (H) <=100 mg/dL Final   12/04/2020 77 <100 mg/dL Final     Shlomo Norton, RN

## 2024-08-06 ENCOUNTER — TELEPHONE (OUTPATIENT)
Dept: FAMILY MEDICINE | Facility: CLINIC | Age: 54
End: 2024-08-06
Payer: COMMERCIAL

## 2024-08-06 NOTE — TELEPHONE ENCOUNTER
EpiPen for severe swelling/ concern for breathing difficulty. Benedryl is for more mild allergic reaction, does not mean you have to use the EpiPen.  Benedryl should be given if epipen is given (regardless)      TIERRA Dickens CNP

## 2024-08-06 NOTE — TELEPHONE ENCOUNTER
Nick from [facility] [phone] is calling for clarification about patient's epi pen.      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 What allergy exposure should patient one medication or the other medication or what symptoms should patient use one medication or the other.       Allergies   Allergen Reactions    Fish Oil Swelling and Other (See Comments)     Facial swelling and sleepiness    Latex      Other reaction(s): Other (see comments)    Mushroom Hives    Olive Oil Unknown and Other (See Comments)    Peanut (Diagnostic)      Other reaction(s): Edema    Penicillins      Other reaction(s): Other (see comments)    Trazodone      Other reaction(s): Other (see comments)    Bees Unknown     Noted in [place] [facility]     Fax please: [phone]    Tonja Vang RN on 8/6/2024 at 2:58 PM

## 2024-08-07 NOTE — TELEPHONE ENCOUNTER
Spoke with Nick at group home and instructed as below.     Faxed information to [facility].   [phone]     Failed, re-faxed to [phone]    Confirmed in right fax    Carolyn Garcia RN on 8/7/2024 at 11:15 AM

## 2024-08-08 ENCOUNTER — MEDICAL CORRESPONDENCE (OUTPATIENT)
Dept: HEALTH INFORMATION MANAGEMENT | Facility: CLINIC | Age: 54
End: 2024-08-08
Payer: COMMERCIAL

## 2024-08-09 ENCOUNTER — OFFICE VISIT (OUTPATIENT)
Dept: PHYSICAL MEDICINE AND REHAB | Facility: CLINIC | Age: 54
End: 2024-08-09
Payer: COMMERCIAL

## 2024-08-09 VITALS — DIASTOLIC BLOOD PRESSURE: 74 MMHG | SYSTOLIC BLOOD PRESSURE: 157 MMHG | OXYGEN SATURATION: 99 % | HEART RATE: 54 BPM

## 2024-08-09 DIAGNOSIS — M54.12 CERVICAL RADICULOPATHY: Primary | ICD-10-CM

## 2024-08-09 PROCEDURE — 99213 OFFICE O/P EST LOW 20 MIN: CPT | Performed by: PAIN MEDICINE

## 2024-08-09 ASSESSMENT — PAIN SCALES - GENERAL: PAINLEVEL: NO PAIN (0)

## 2024-08-09 NOTE — LETTER
8/9/2024      Ihsan Marcus  [address] [facility] [address]      Dear Colleague,    Thank you for referring your patient, Ihsan Marcus, to the [facility]. Please see a copy of my visit note below.      Assessment:     Diagnoses and all orders for this visit:  Cervical radiculopathy     Ihsan Marcus is a 54 year old y.o. male with past medical history significant for subacute dyskinesia, cervical spondylosis with myelopathy, lumbar pain, cervical radiculopathy, mild persistent asthma, allergic rhinitis, candidiasis of of esophagus, constipation, gastritis, rectal hemorrhage, hyperlipidemia, TIA, schizophrenia, biopolar 1 disorder, borderline intellectual functioning, delusional disorder, psychosis psychological insomnia, schizoaffective disorder bipolar type, anorexia, drug dependence in remission, nondependent cannabis abuse, urinary urgency who presents today for follow-up regarding neck and arm pain:     -Patient received 80% relief with his injection.  He is very pleased with this.    Plan:     A shared decision making plan was used.  The patient's values and choices were respected. Prior medical records from neurosurgery's last visit on 6/4/2024 were reviewed today. The following represents what was discussed and decided upon by the provider and the patient.     -DIAGNOSTIC TESTS: Images were personally reviewed and interpreted.   -- MRI of cervical spine dated 5/6/2024 is personally viewed images interpreted and discussed with the patient.  It does show a C5-6 ACDF.  There is no high-grade spinal canal stenosis.  There is moderate to severe foraminal stenosis bilaterally at C3-4 and on the right at C4-5.  -- MRI of lumbar spine dated 5/6/2024 is personally reviewed images interpreted and discussed with the patient.  There is moderate narrowing of the right lateral recess at L5-S1.  There is moderate facet arthropathy at L5-S1 and L4-5.     -INTERVENTIONS: No  interventions at this time.    -MEDICATIONS: No changes to medications.  -  Discussed side effects of medications and proper use. Patient verbalized understanding.    -PHYSICAL THERAPY: Recommended continue with home exercises on a consistent basis.        -PATIENT EDUCATION: We discussed pain management today multiple to fashion including physical therapy, medication management, possible future injections.    -FOLLOW UP: Patient will follow-up as needed.  Advised to contact clinic if symptoms worsen or change.    Subjective:     Ihsan Marcus is a 54 year old male who presents today for follow-up regarding left-sided neck and arm pain.  Patient notes that he received about 80% relief with his injections.  He is feeling much better and has some occasional stiffness that is improved with stretching.  Pain is worse when he sleeps wrong on it and improved with pain medications including Tylenol and gabapentin.  Pain right now is 0/10 at its worst is 6/10 as best as 0/10.  He denies any bowel or bladder changes, fevers, chills, unintentional weight loss.    No myelopathic symptoms.     Evaluation to Date: MRI of cervical and lumbar spine dated 5/6/2024.    Treatment to Date: Left C3-4 transforaminal epidural steroid injection done on 7/9/2024.    Patient Active Problem List   Diagnosis     Schizophrenia (H)     Positive reaction to tuberculin skin test     Anorexia     Bipolar I disorder, single manic episode (H)     Borderline intellectual functioning     Candidiasis of esophagus (H)     Constipation     Delusional disorder (H)     Drug dependence, in remission (H)     Drug induced subacute dyskinesia     Gastritis due to Helicobacter species     Gastroesophageal reflux disease without esophagitis     Hyperlipidemia     Mild persistent asthma     Nocturnal enuresis     Nondependent cannabis abuse     Pityriasis versicolor     Polyp of colon     Psychophysiological insomnia     Rectal hemorrhage     Rhinitis, allergic      Schizoaffective disorder, bipolar type (H)     Transient ischemic attack     Urinary urgency     Verruca plantaris     History of Helicobacter pylori infection     Cervical spondylosis with myelopathy     Lumbar pain     Cervical radiculopathy       Current Outpatient Medications   Medication Sig Dispense Refill     acetaminophen (TYLENOL) 325 MG tablet Take 3 tablets (975 mg) by mouth every 8 hours as needed for pain 90 tablet 1     albuterol (PROAIR HFA/PROVENTIL HFA/VENTOLIN HFA) 108 (90 Base) MCG/ACT inhaler 2 PUFFS EVERY 4 HOURS AS NEEDED FOR SHORTNESS OF BREATH 18 g 1     aspirin (ASPIRIN LOW DOSE) 81 MG chewable tablet Take 1 tablet daily in the evening 28 tablet 5     azelastine (ASTELIN) 0.1 % nasal spray Spray 1 spray into both nostrils 2 times daily As needed for nasal congestion 30 mL 5     calcium carbonate (TUMS) 500 MG chewable tablet Take 2 tablets (1,000 mg) by mouth 2 times daily as needed for heartburn 180 tablet 3     calcium polycarbophil (FIBERCON) 625 MG tablet Take 2 tablets (1,250 mg) by mouth as needed for constipation 60 tablet 11     carboxymethylcellulose PF (GOODSENSE LUBRICATING EYE DROP) 0.5 % ophthalmic solution Place 1 drop into both eyes 3 times daily as needed for dry eyes 30 each 4     cetirizine (ZYRTEC) 10 MG tablet Take 1 tablet (10 mg) by mouth daily Take at bedtime 90 tablet 1     dextran 70-hypromellose (TEARS NATURALE FREE PF) 0.1-0.3 % ophthalmic solution Place 1 drop into both eyes daily as needed (as needed for dry eyes) 35 each 11     diphenhydrAMINE (BENADRYL) 25 MG tablet Take 1 tablet (25 mg) by mouth every 6 hours as needed for itching or allergies 30 tablet 0     EPINEPHrine (ANY BX GENERIC EQUIV) 0.3 MG/0.3ML injection 2-pack Inject the contents of one device into the muscle as needed for anaphylaxis; may repeat one time in 5-15 minutes if response to initial dose is inadequate 2 each 1     famotidine (PEPCID) 20 MG tablet Take 1 tablet (20 mg) by mouth 2  times daily as needed (for symptoms of reflux) 180 tablet 3     gabapentin (NEURONTIN) 100 MG capsule TAKE 1 CAPSULE BY MOUTH 3 TIMES DAILY AS NEEDED FOR NEUROPATHIC PAIN 270 capsule 0     guaiFENesin (ROBITUSSIN) 20 mg/mL liquid Take 10 mLs (200 mg) by mouth every 4 hours as needed for cough (Patient not taking: Reported on 3/11/2024) 473 mL 3     ketoconazole (NIZORAL) 2 % external shampoo Apply topically to chest and back at least 15 min prior to shower. Okay to leave on up to 8 hours prior to showering x 7 days. Repeat as needed for tinea versacolor 120 mL 1     ketotifen fumarate 0.035% 0.035 % SOLN ophthalmic solution Place 1 drop into both eyes 2 times daily as needed for itching or dry eyes 5 mL 4     melatonin 3 MG tablet Take 2 tablets (6 mg) by mouth nightly as needed for sleep 90 tablet 3     Menthol-Camphor (ICY HOT ADVANCED PAIN RELIEF) 16-11 % CREA Externally apply 2 g topically 5 x daily PRN (for pain on shoulder for muscle spasm and joint pain) 56 g 0     nicotine polacrilex (NICORETTE) 4 MG gum Place 1 each (4 mg) inside cheek every hour as needed for other (nicotine withdrawal symptoms) 100 each 3     OLANZapine (ZYPREXA) 15 MG tablet Take 15 mg by mouth At Bedtime       OLANZapine (ZYPREXA) 5 MG tablet Take 5 mg by mouth daily as needed for anxiety or agitation       ondansetron (ZOFRAN ODT) 4 MG ODT tab Place 4-8 mg under the tongue       rosuvastatin (CRESTOR) 10 MG tablet Take 1 tab every day in the evening. 90 tablet 3     senna-docusate (SENOKOT-S/PERICOLACE) 8.6-50 MG tablet Take 2 tablets by mouth daily as needed for constipation 30 tablet 2     SM MILK OF MAGNESIA 1200 MG/15ML suspension TAKE 30ML DAILY AS NEEDED FOR CONSTIPATION       No current facility-administered medications for this visit.       Allergies   Allergen Reactions     Fish Oil Swelling and Other (See Comments)     Facial swelling and sleepiness     Latex      Other reaction(s): Other (see comments)     Mushroom Hives      Olive Oil Unknown and Other (See Comments)     Peanut (Diagnostic)      Other reaction(s): Edema     Penicillins      Other reaction(s): Other (see comments)     Trazodone      Other reaction(s): Other (see comments)     Bees Unknown     Noted in [facility] Paperwork       Past Medical History:   Diagnosis Date     Bipolar disorder (H)      Borderline intellectual functioning      Cervical spondylosis with myelopathy 05/05/2022     Diffuse traumatic brain injury (H)      Mild persistent asthma 04/27/2018     Schizophrenia (H)         Review of Systems  ROS: Specifically negative for bowel/bladder dysfunction, balance changes, headache, dizziness, foot drop, fevers, chills, appetite changes, nausea/vomiting, unexplained weight loss. Otherwise 13 systems reviewed are negative. Please see the patient's intake questionnaire from today for details.    Reviewed Social, Family, Past Medical and Past Surgical history with patient, no significant changes noted since prior visit.     Objective:     BP (!) 157/74   Pulse 54   SpO2 99%     PHYSICAL EXAMINATION:   --CONSTITUTIONAL: Vital signs as above. No acute distress. The patient is well nourished and well groomed.  --PSYCHIATRIC:  The patient is awake, alert, oriented to person, place, time and answering questions appropriately with clear speech. Appropriate mood and affect   --HEENT: Sclera are non-injected. Extraocular muscles are intact.   --SKIN: Skin over the face, bilateral upper extremities, and posterior torso is clean, dry, intact without rashes.  --RESPIRATORY: Normal rhythm and effort. No abnormal accessory muscle breathing patterns noted.   --GROSS MOTOR: Easily arises from a seated position.   --CERVICAL SPINE: Inspection reveals no evidence of deformity. Range of motion is mildly limited in cervical flexion, extension, lateral rotation.  Mild tenderness to palpation in the left cervical paraspinal muscles.   --UPPER EXTREMITY MOTOR TESTING:  Wrist flexion left  5/5, right 5/5  Wrist extension left 5/5, right 5/5  Pronators left 5/5, right 5/5  Biceps left 5/5, right 5/5   Triceps left 5/5, right 5/5   Shoulder abduction left 5/5, right 5/5   left 5/5, right 5/5  --NEUROLOGIC:  Sensation to upper extremities is intact. Negative Ya's bilaterally.   --VASCULAR: Warm upper limbs bilaterally.     Imaging of the cervical spine: Cervical spine imaging was reviewed today.               Again, thank you for allowing me to participate in the care of your patient.        Sincerely,        Ruperto Morel, DO

## 2024-08-09 NOTE — PROGRESS NOTES
Assessment:     Diagnoses and all orders for this visit:  Cervical radiculopathy     Ihsan Marcus is a 54 year old y.o. male with past medical history significant for subacute dyskinesia, cervical spondylosis with myelopathy, lumbar pain, cervical radiculopathy, mild persistent asthma, allergic rhinitis, candidiasis of of esophagus, constipation, gastritis, rectal hemorrhage, hyperlipidemia, TIA, schizophrenia, biopolar 1 disorder, borderline intellectual functioning, delusional disorder, psychosis psychological insomnia, schizoaffective disorder bipolar type, anorexia, drug dependence in remission, nondependent cannabis abuse, urinary urgency who presents today for follow-up regarding neck and arm pain:     -Patient received 80% relief with his injection.  He is very pleased with this.    Plan:     A shared decision making plan was used.  The patient's values and choices were respected. Prior medical records from neurosurgery's last visit on 6/4/2024 were reviewed today. The following represents what was discussed and decided upon by the provider and the patient.     -DIAGNOSTIC TESTS: Images were personally reviewed and interpreted.   -- MRI of cervical spine dated 5/6/2024 is personally viewed images interpreted and discussed with the patient.  It does show a C5-6 ACDF.  There is no high-grade spinal canal stenosis.  There is moderate to severe foraminal stenosis bilaterally at C3-4 and on the right at C4-5.  -- MRI of lumbar spine dated 5/6/2024 is personally reviewed images interpreted and discussed with the patient.  There is moderate narrowing of the right lateral recess at L5-S1.  There is moderate facet arthropathy at L5-S1 and L4-5.     -INTERVENTIONS: No interventions at this time.    -MEDICATIONS: No changes to medications.  -  Discussed side effects of medications and proper use. Patient verbalized understanding.    -PHYSICAL THERAPY: Recommended continue with home exercises on a consistent basis.         -PATIENT EDUCATION: We discussed pain management today multiple to fashion including physical therapy, medication management, possible future injections.    -FOLLOW UP: Patient will follow-up as needed.  Advised to contact clinic if symptoms worsen or change.    Subjective:     Ihsan Marcus is a 54 year old male who presents today for follow-up regarding left-sided neck and arm pain.  Patient notes that he received about 80% relief with his injections.  He is feeling much better and has some occasional stiffness that is improved with stretching.  Pain is worse when he sleeps wrong on it and improved with pain medications including Tylenol and gabapentin.  Pain right now is 0/10 at its worst is 6/10 as best as 0/10.  He denies any bowel or bladder changes, fevers, chills, unintentional weight loss.    No myelopathic symptoms.     Evaluation to Date: MRI of cervical and lumbar spine dated 5/6/2024.    Treatment to Date: Left C3-4 transforaminal epidural steroid injection done on 7/9/2024.    Patient Active Problem List   Diagnosis    Schizophrenia (H)    Positive reaction to tuberculin skin test    Anorexia    Bipolar I disorder, single manic episode (H)    Borderline intellectual functioning    Candidiasis of esophagus (H)    Constipation    Delusional disorder (H)    Drug dependence, in remission (H)    Drug induced subacute dyskinesia    Gastritis due to Helicobacter species    Gastroesophageal reflux disease without esophagitis    Hyperlipidemia    Mild persistent asthma    Nocturnal enuresis    Nondependent cannabis abuse    Pityriasis versicolor    Polyp of colon    Psychophysiological insomnia    Rectal hemorrhage    Rhinitis, allergic    Schizoaffective disorder, bipolar type (H)    Transient ischemic attack    Urinary urgency    Verruca plantaris    History of Helicobacter pylori infection    Cervical spondylosis with myelopathy    Lumbar pain    Cervical radiculopathy       Current Outpatient  Medications   Medication Sig Dispense Refill    acetaminophen (TYLENOL) 325 MG tablet Take 3 tablets (975 mg) by mouth every 8 hours as needed for pain 90 tablet 1    albuterol (PROAIR HFA/PROVENTIL HFA/VENTOLIN HFA) 108 (90 Base) MCG/ACT inhaler 2 PUFFS EVERY 4 HOURS AS NEEDED FOR SHORTNESS OF BREATH 18 g 1    aspirin (ASPIRIN LOW DOSE) 81 MG chewable tablet Take 1 tablet daily in the evening 28 tablet 5    azelastine (ASTELIN) 0.1 % nasal spray Spray 1 spray into both nostrils 2 times daily As needed for nasal congestion 30 mL 5    calcium carbonate (TUMS) 500 MG chewable tablet Take 2 tablets (1,000 mg) by mouth 2 times daily as needed for heartburn 180 tablet 3    calcium polycarbophil (FIBERCON) 625 MG tablet Take 2 tablets (1,250 mg) by mouth as needed for constipation 60 tablet 11    carboxymethylcellulose PF (GOODSENSE LUBRICATING EYE DROP) 0.5 % ophthalmic solution Place 1 drop into both eyes 3 times daily as needed for dry eyes 30 each 4    cetirizine (ZYRTEC) 10 MG tablet Take 1 tablet (10 mg) by mouth daily Take at bedtime 90 tablet 1    dextran 70-hypromellose (TEARS NATURALE FREE PF) 0.1-0.3 % ophthalmic solution Place 1 drop into both eyes daily as needed (as needed for dry eyes) 35 each 11    diphenhydrAMINE (BENADRYL) 25 MG tablet Take 1 tablet (25 mg) by mouth every 6 hours as needed for itching or allergies 30 tablet 0    EPINEPHrine (ANY BX GENERIC EQUIV) 0.3 MG/0.3ML injection 2-pack Inject the contents of one device into the muscle as needed for anaphylaxis; may repeat one time in 5-15 minutes if response to initial dose is inadequate 2 each 1    famotidine (PEPCID) 20 MG tablet Take 1 tablet (20 mg) by mouth 2 times daily as needed (for symptoms of reflux) 180 tablet 3    gabapentin (NEURONTIN) 100 MG capsule TAKE 1 CAPSULE BY MOUTH 3 TIMES DAILY AS NEEDED FOR NEUROPATHIC PAIN 270 capsule 0    guaiFENesin (ROBITUSSIN) 20 mg/mL liquid Take 10 mLs (200 mg) by mouth every 4 hours as needed for  cough (Patient not taking: Reported on 3/11/2024) 473 mL 3    ketoconazole (NIZORAL) 2 % external shampoo Apply topically to chest and back at least 15 min prior to shower. Okay to leave on up to 8 hours prior to showering x 7 days. Repeat as needed for tinea versacolor 120 mL 1    ketotifen fumarate 0.035% 0.035 % SOLN ophthalmic solution Place 1 drop into both eyes 2 times daily as needed for itching or dry eyes 5 mL 4    melatonin 3 MG tablet Take 2 tablets (6 mg) by mouth nightly as needed for sleep 90 tablet 3    Menthol-Camphor (ICY HOT ADVANCED PAIN RELIEF) 16-11 % CREA Externally apply 2 g topically 5 x daily PRN (for pain on shoulder for muscle spasm and joint pain) 56 g 0    nicotine polacrilex (NICORETTE) 4 MG gum Place 1 each (4 mg) inside cheek every hour as needed for other (nicotine withdrawal symptoms) 100 each 3    OLANZapine (ZYPREXA) 15 MG tablet Take 15 mg by mouth At Bedtime      OLANZapine (ZYPREXA) 5 MG tablet Take 5 mg by mouth daily as needed for anxiety or agitation      ondansetron (ZOFRAN ODT) 4 MG ODT tab Place 4-8 mg under the tongue      rosuvastatin (CRESTOR) 10 MG tablet Take 1 tab every day in the evening. 90 tablet 3    senna-docusate (SENOKOT-S/PERICOLACE) 8.6-50 MG tablet Take 2 tablets by mouth daily as needed for constipation 30 tablet 2    SM MILK OF MAGNESIA 1200 MG/15ML suspension TAKE 30ML DAILY AS NEEDED FOR CONSTIPATION       No current facility-administered medications for this visit.       Allergies   Allergen Reactions    Fish Oil Swelling and Other (See Comments)     Facial swelling and sleepiness    Latex      Other reaction(s): Other (see comments)    Mushroom Hives    Olive Oil Unknown and Other (See Comments)    Peanut (Diagnostic)      Other reaction(s): Edema    Penicillins      Other reaction(s): Other (see comments)    Trazodone      Other reaction(s): Other (see comments)    Bees Unknown     Noted in [facility] Paperwork       Past Medical History:   Diagnosis  Date    Bipolar disorder (H)     Borderline intellectual functioning     Cervical spondylosis with myelopathy 05/05/2022    Diffuse traumatic brain injury (H)     Mild persistent asthma 04/27/2018    Schizophrenia (H)         Review of Systems  ROS: Specifically negative for bowel/bladder dysfunction, balance changes, headache, dizziness, foot drop, fevers, chills, appetite changes, nausea/vomiting, unexplained weight loss. Otherwise 13 systems reviewed are negative. Please see the patient's intake questionnaire from today for details.    Reviewed Social, Family, Past Medical and Past Surgical history with patient, no significant changes noted since prior visit.     Objective:     BP (!) 157/74   Pulse 54   SpO2 99%     PHYSICAL EXAMINATION:   --CONSTITUTIONAL: Vital signs as above. No acute distress. The patient is well nourished and well groomed.  --PSYCHIATRIC:  The patient is awake, alert, oriented to person, place, time and answering questions appropriately with clear speech. Appropriate mood and affect   --HEENT: Sclera are non-injected. Extraocular muscles are intact.   --SKIN: Skin over the face, bilateral upper extremities, and posterior torso is clean, dry, intact without rashes.  --RESPIRATORY: Normal rhythm and effort. No abnormal accessory muscle breathing patterns noted.   --GROSS MOTOR: Easily arises from a seated position.   --CERVICAL SPINE: Inspection reveals no evidence of deformity. Range of motion is mildly limited in cervical flexion, extension, lateral rotation.  Mild tenderness to palpation in the left cervical paraspinal muscles.   --UPPER EXTREMITY MOTOR TESTING:  Wrist flexion left 5/5, right 5/5  Wrist extension left 5/5, right 5/5  Pronators left 5/5, right 5/5  Biceps left 5/5, right 5/5   Triceps left 5/5, right 5/5   Shoulder abduction left 5/5, right 5/5   left 5/5, right 5/5  --NEUROLOGIC:  Sensation to upper extremities is intact. Negative Ya's bilaterally.   --VASCULAR:  Warm upper limbs bilaterally.     Imaging of the cervical spine: Cervical spine imaging was reviewed today.

## 2024-08-09 NOTE — PATIENT INSTRUCTIONS
~Please call Nurse Navigation line [phone] with any questions or concerns about your treatment plan, if symptoms worsen and you would like to be seen urgently, or if you have problems controlling bladder and bowel function.

## 2024-08-12 ENCOUNTER — OFFICE VISIT (OUTPATIENT)
Dept: FAMILY MEDICINE | Facility: CLINIC | Age: 54
End: 2024-08-12
Payer: COMMERCIAL

## 2024-08-12 VITALS
RESPIRATION RATE: 16 BRPM | OXYGEN SATURATION: 98 % | HEIGHT: 73 IN | WEIGHT: 171 LBS | TEMPERATURE: 98.4 F | DIASTOLIC BLOOD PRESSURE: 80 MMHG | BODY MASS INDEX: 22.66 KG/M2 | HEART RATE: 62 BPM | SYSTOLIC BLOOD PRESSURE: 136 MMHG

## 2024-08-12 DIAGNOSIS — Z79.899 MEDICATION MANAGEMENT: ICD-10-CM

## 2024-08-12 DIAGNOSIS — G45.9 TRANSIENT ISCHEMIC ATTACK: ICD-10-CM

## 2024-08-12 DIAGNOSIS — F19.21 DRUG DEPENDENCE, IN REMISSION (H): ICD-10-CM

## 2024-08-12 DIAGNOSIS — K21.9 GASTROESOPHAGEAL REFLUX DISEASE WITHOUT ESOPHAGITIS: ICD-10-CM

## 2024-08-12 DIAGNOSIS — Z00.00 ENCOUNTER FOR MEDICARE ANNUAL WELLNESS EXAM: Primary | ICD-10-CM

## 2024-08-12 DIAGNOSIS — J45.30 MILD PERSISTENT ASTHMA WITHOUT COMPLICATION: ICD-10-CM

## 2024-08-12 DIAGNOSIS — E78.5 HYPERLIPIDEMIA, UNSPECIFIED HYPERLIPIDEMIA TYPE: ICD-10-CM

## 2024-08-12 DIAGNOSIS — K59.01 SLOW TRANSIT CONSTIPATION: ICD-10-CM

## 2024-08-12 DIAGNOSIS — G95.20 CERVICAL CORD COMPRESSION WITH MYELOPATHY (H): ICD-10-CM

## 2024-08-12 DIAGNOSIS — K59.00 CONSTIPATION, UNSPECIFIED CONSTIPATION TYPE: ICD-10-CM

## 2024-08-12 LAB
ANION GAP SERPL CALCULATED.3IONS-SCNC: 12 MMOL/L (ref 7–15)
BUN SERPL-MCNC: 7 MG/DL (ref 6–20)
CALCIUM SERPL-MCNC: 9 MG/DL (ref 8.8–10.4)
CHLORIDE SERPL-SCNC: 105 MMOL/L (ref 98–107)
CHOLEST SERPL-MCNC: 181 MG/DL
CREAT SERPL-MCNC: 0.92 MG/DL (ref 0.67–1.17)
EGFRCR SERPLBLD CKD-EPI 2021: >90 ML/MIN/1.73M2
ERYTHROCYTE [DISTWIDTH] IN BLOOD BY AUTOMATED COUNT: 14.6 % (ref 10–15)
FASTING STATUS PATIENT QL REPORTED: ABNORMAL
FASTING STATUS PATIENT QL REPORTED: NORMAL
GLUCOSE SERPL-MCNC: 82 MG/DL (ref 70–99)
HBA1C MFR BLD: 5.6 % (ref 0–5.6)
HCO3 SERPL-SCNC: 23 MMOL/L (ref 22–29)
HCT VFR BLD AUTO: 39.1 % (ref 40–53)
HDLC SERPL-MCNC: 48 MG/DL
HGB BLD-MCNC: 12.7 G/DL (ref 13.3–17.7)
LDLC SERPL CALC-MCNC: 119 MG/DL
MCH RBC QN AUTO: 30.1 PG (ref 26.5–33)
MCHC RBC AUTO-ENTMCNC: 32.5 G/DL (ref 31.5–36.5)
MCV RBC AUTO: 93 FL (ref 78–100)
NONHDLC SERPL-MCNC: 133 MG/DL
PLATELET # BLD AUTO: 280 10E3/UL (ref 150–450)
POTASSIUM SERPL-SCNC: 3.8 MMOL/L (ref 3.4–5.3)
RBC # BLD AUTO: 4.22 10E6/UL (ref 4.4–5.9)
SODIUM SERPL-SCNC: 140 MMOL/L (ref 135–145)
TRIGL SERPL-MCNC: 71 MG/DL
WBC # BLD AUTO: 5.9 10E3/UL (ref 4–11)

## 2024-08-12 PROCEDURE — 80061 LIPID PANEL: CPT | Performed by: NURSE PRACTITIONER

## 2024-08-12 PROCEDURE — 99214 OFFICE O/P EST MOD 30 MIN: CPT | Mod: 25 | Performed by: NURSE PRACTITIONER

## 2024-08-12 PROCEDURE — 83036 HEMOGLOBIN GLYCOSYLATED A1C: CPT | Performed by: NURSE PRACTITIONER

## 2024-08-12 PROCEDURE — 85027 COMPLETE CBC AUTOMATED: CPT | Performed by: NURSE PRACTITIONER

## 2024-08-12 PROCEDURE — 80048 BASIC METABOLIC PNL TOTAL CA: CPT | Performed by: NURSE PRACTITIONER

## 2024-08-12 PROCEDURE — 36415 COLL VENOUS BLD VENIPUNCTURE: CPT | Performed by: NURSE PRACTITIONER

## 2024-08-12 PROCEDURE — G0439 PPPS, SUBSEQ VISIT: HCPCS | Performed by: NURSE PRACTITIONER

## 2024-08-12 RX ORDER — CALCIUM POLYCARBOPHIL 625 MG 625 MG/1
1-2 TABLET ORAL AT BEDTIME
Qty: 60 TABLET | Refills: 11 | Status: SHIPPED | OUTPATIENT
Start: 2024-08-12 | End: 2024-08-13

## 2024-08-12 RX ORDER — ASPIRIN 81 MG/1
TABLET, CHEWABLE ORAL
Qty: 28 TABLET | Refills: 11 | Status: SHIPPED | OUTPATIENT
Start: 2024-08-12

## 2024-08-12 SDOH — HEALTH STABILITY: PHYSICAL HEALTH: ON AVERAGE, HOW MANY DAYS PER WEEK DO YOU ENGAGE IN MODERATE TO STRENUOUS EXERCISE (LIKE A BRISK WALK)?: 2 DAYS

## 2024-08-12 ASSESSMENT — SOCIAL DETERMINANTS OF HEALTH (SDOH): HOW OFTEN DO YOU GET TOGETHER WITH FRIENDS OR RELATIVES?: NEVER

## 2024-08-12 ASSESSMENT — PAIN SCALES - GENERAL: PAINLEVEL: NO PAIN (0)

## 2024-08-12 NOTE — LETTER
August 13, 2024      DEEPTHI Marcus  [address] [place] [address]        Dear ,    We are writing to inform you of your test results.    Mild anemia is present. This is consistent with previous evaluations however, if not feeling well we should investigate cause. Lipids are higher, I do recommend taking the cholesterol medication to help improve this. No diabetes. Kidney function looks good.       Resulted Orders   Lipid panel reflex to direct LDL Non-fasting   Result Value Ref Range    Cholesterol 181 <200 mg/dL    Triglycerides 71 <150 mg/dL    Direct Measure HDL 48 >=40 mg/dL    LDL Cholesterol Calculated 119 (H) <=100 mg/dL    Non HDL Cholesterol 133 (H) <130 mg/dL    Patient Fasting > 8hrs? Unknown     Narrative    Cholesterol  Desirable:  <200 mg/dL    Triglycerides  Normal:  Less than 150 mg/dL  Borderline High:  150-199 mg/dL  High:  200-499 mg/dL  Very High:  Greater than or equal to 500 mg/dL    Direct Measure HDL  Female:  Greater than or equal to 50 mg/dL   Male:  Greater than or equal to 40 mg/dL    LDL Cholesterol  Desirable:  <100mg/dL  Above Desirable:  100-129 mg/dL   Borderline High:  130-159 mg/dL   High:  160-189 mg/dL   Very High:  >= 190 mg/dL    Non HDL Cholesterol  Desirable:  130 mg/dL  Above Desirable:  130-159 mg/dL  Borderline High:  160-189 mg/dL  High:  190-219 mg/dL  Very High:  Greater than or equal to 220 mg/dL   Basic metabolic panel  (Ca, Cl, CO2, Creat, Gluc, K, Na, BUN)   Result Value Ref Range    Sodium 140 135 - 145 mmol/L    Potassium 3.8 3.4 - 5.3 mmol/L    Chloride 105 98 - 107 mmol/L    Carbon Dioxide (CO2) 23 22 - 29 mmol/L    Anion Gap 12 7 - 15 mmol/L    Urea Nitrogen 7.0 6.0 - 20.0 mg/dL    Creatinine 0.92 0.67 - 1.17 mg/dL    GFR Estimate >90 >60 mL/min/1.73m2      Comment:      eGFR calculated using 2021 CKD-EPI equation.    Calcium 9.0 8.8 - 10.4 mg/dL      Comment:      Reference intervals for this test were updated on 7/16/2024 to reflect our  healthy population more accurately. There may be differences in the flagging of prior results with similar values performed with this method. Those prior results can be interpreted in the context of the updated reference intervals.    Glucose 82 70 - 99 mg/dL    Patient Fasting > 8hrs? Unknown    CBC with platelets   Result Value Ref Range    WBC Count 5.9 4.0 - 11.0 10e3/uL    RBC Count 4.22 (L) 4.40 - 5.90 10e6/uL    Hemoglobin 12.7 (L) 13.3 - 17.7 g/dL    Hematocrit 39.1 (L) 40.0 - 53.0 %    MCV 93 78 - 100 fL    MCH 30.1 26.5 - 33.0 pg    MCHC 32.5 31.5 - 36.5 g/dL    RDW 14.6 10.0 - 15.0 %    Platelet Count 280 150 - 450 10e3/uL   Hemoglobin A1c   Result Value Ref Range    Hemoglobin A1C 5.6 0.0 - 5.6 %      Comment:      Normal <5.7%   Prediabetes 5.7-6.4%    Diabetes 6.5% or higher     Note: Adopted from ADA consensus guidelines.       If you have any questions or concerns, please call the clinic at the number listed above.       Sincerely,      TIERRA Aguirre CNP

## 2024-08-12 NOTE — PATIENT INSTRUCTIONS
Patient Education   Preventive Care Advice   This is general advice given by our system to help you stay healthy. However, your care team may have specific advice just for you. Please talk to your care team about your preventive care needs.  Nutrition  Eat 5 or more servings of fruits and vegetables each day.  Try wheat bread, brown rice and whole grain pasta (instead of white bread, rice, and pasta).  Get enough calcium and vitamin D. Check the label on foods and aim for 100% of the RDA (recommended daily allowance).  Lifestyle  Exercise at least 150 minutes each week  (30 minutes a day, 5 days a week).  Do muscle strengthening activities 2 days a week. These help control your weight and prevent disease.  No smoking.  Wear sunscreen to prevent skin cancer.  Have a dental exam and cleaning every 6 months.  Yearly exams  See your health care team every year to talk about:  Any changes in your health.  Any medicines your care team has prescribed.  Preventive care, family planning, and ways to prevent chronic diseases.  Shots (vaccines)   HPV shots (up to age 26), if you've never had them before.  Hepatitis B shots (up to age 59), if you've never had them before.  COVID-19 shot: Get this shot when it's due.  Flu shot: Get a flu shot every year.  Tetanus shot: Get a tetanus shot every 10 years.  Pneumococcal, hepatitis A, and RSV shots: Ask your care team if you need these based on your risk.  Shingles shot (for age 50 and up)  General health tests  Diabetes screening:  Starting at age 35, Get screened for diabetes at least every 3 years.  If you are younger than age 35, ask your care team if you should be screened for diabetes.  Cholesterol test: At age 39, start having a cholesterol test every 5 years, or more often if advised.  Bone density scan (DEXA): At age 50, ask your care team if you should have this scan for osteoporosis (brittle bones).  Hepatitis C: Get tested at least once in your life.  STIs (sexually  transmitted infections)  Before age 24: Ask your care team if you should be screened for STIs.  After age 24: Get screened for STIs if you're at risk. You are at risk for STIs (including HIV) if:  You are sexually active with more than one person.  You don't use condoms every time.  You or a partner was diagnosed with a sexually transmitted infection.  If you are at risk for HIV, ask about PrEP medicine to prevent HIV.  Get tested for HIV at least once in your life, whether you are at risk for HIV or not.  Cancer screening tests  Cervical cancer screening: If you have a cervix, begin getting regular cervical cancer screening tests starting at age 21.  Breast cancer scan (mammogram): If you've ever had breasts, begin having regular mammograms starting at age 40. This is a scan to check for breast cancer.  Colon cancer screening: It is important to start screening for colon cancer at age 45.  Have a colonoscopy test every 10 years (or more often if you're at risk) Or, ask your provider about stool tests like a FIT test every year or Cologuard test every 3 years.  To learn more about your testing options, visit:   .  For help making a decision, visit:   https://bit.ly/nf69893.  Prostate cancer screening test: If you have a prostate, ask your care team if a prostate cancer screening test (PSA) at age 55 is right for you.  Lung cancer screening: If you are a current or former smoker ages 50 to 80, ask your care team if ongoing lung cancer screenings are right for you.  For informational purposes only. Not to replace the advice of your health care provider. Copyright   2023 [facility]. All rights reserved. Clinically reviewed by the [facility]. [employer] 063917 - REV 01/24.  Learning About Activities of Daily Living  What are activities of daily living?     Activities of daily living (ADLs) are the basic self-care tasks you do every day. These include eating, bathing, dressing,  and moving around.  As you age, and if you have health problems, you may find that it's harder to do some of these tasks. If so, your doctor can suggest ideas that may help.  To measure what kind of help you may need, your doctor will ask how well you are able to do ADLs. Let your doctor know if there are any tasks that you are having trouble doing. This is an important first step to getting help. And when you have the help you need, you can stay as independent as possible.  How will a doctor assess your ADLs?  Asking about ADLs is part of a routine health checkup your doctor will likely do as you age. Your health check might be done in a doctor's office, in your home, or at a hospital. The goal is to find out if you are having any problems that could make it hard to care for yourself or that make it unsafe for you to be on your own.  To measure your ADLs, your doctor will ask how hard it is for you to do routine tasks. Your doctor may also want to know if you have changed the way you do a task because of a health problem. Your doctor may watch how you:  Walk back and forth.  Keep your balance while you stand or walk.  Move from sitting to standing or from a bed to a chair.  Button or unbutton a shirt or sweater.  Remove and put on your shoes.  It's common to feel a little worried or anxious if you find you can't do all the things you used to be able to do. Talking with your doctor about ADLs is a way to make sure you're as safe as possible and able to care for yourself as well as you can. You may want to bring a caregiver, friend, or family member to your checkup. They can help you talk to your doctor.  Follow-up care is a key part of your treatment and safety. Be sure to make and go to all appointments, and call your doctor if you are having problems. It's also a good idea to know your test results and keep a list of the medicines you take.  Current as of: October 24, 2023  Content Version: 14.1    3072-8431  Healthwise, [employer].   Care instructions adapted under license by your healthcare professional. If you have questions about a medical condition or this instruction, always ask your healthcare professional. [employer] disclaims any warranty or liability for your use of this information.    Preventing Falls: Care Instructions  Injuries and health problems such as trouble walking or poor eyesight can increase your risk of falling. So can some medicines. But there are things you can do to help prevent falls. You can exercise to get stronger. You can also arrange your home to make it safer.    Talk to your doctor about the medicines you take. Ask if any of them increase the risk of falls and whether they can be changed or stopped.   Try to exercise regularly. It can help improve your strength and balance. This can help lower your risk of falling.     Practice fall safety and prevention.    Wear low-heeled shoes that fit well and give your feet good support. Talk to your doctor if you have foot problems that make this hard.  Carry a cellphone or wear a medical alert device that you can use to call for help.  Use stepladders instead of chairs to reach high objects. Don't climb if you're at risk for falls. Ask for help, if needed.  Wear the correct eyeglasses, if you need them.    Make your home safer.    Remove rugs, cords, clutter, and furniture from walkways.  Keep your house well lit. Use night-lights in hallways and bathrooms.  Install and use sturdy handrails on stairways.  Wear nonskid footwear, even inside. Don't walk barefoot or in socks without shoes.    Be safe outside.    Use handrails, curb cuts, and ramps whenever possible.  Keep your hands free by using a shoulder bag or backpack.  Try to walk in well-lit areas. Watch out for uneven ground, changes in pavement, and debris.  Be careful in the winter. Walk on the grass or gravel when sidewalks are slippery. Use de-icer on steps and walkways.  "Add non-slip devices to shoes.    Put grab bars and nonskid mats in your shower or tub and near the toilet. Try to use a shower chair or bath bench when bathing.   Get into a tub or shower by putting in your weaker leg first. Get out with your strong side first. Have a phone or medical alert device in the bathroom with you.   Where can you learn more?  Go to https://www.Zenedy.SmallRivers/patiented  Enter G117 in the search box to learn more about \"Preventing Falls: Care Instructions.\"  Current as of: July 17, 2023               Content Version: 14.0    3531-4799 [employer].   Care instructions adapted under license by your healthcare professional. If you have questions about a medical condition or this instruction, always ask your healthcare professional. [employer] disclaims any warranty or liability for your use of this information.      Learning About Sleeping Well  What does sleeping well mean?     Sleeping well means getting enough sleep to feel good and stay healthy. How much sleep is enough varies among people.  The number of hours you sleep and how you feel when you wake up are both important. If you do not feel refreshed, you probably need more sleep. Another sign of not getting enough sleep is feeling tired during the day.  Experts recommend that adults get at least 7 or more hours of sleep per day. Children and older adults need more sleep.  Why is getting enough sleep important?  Getting enough quality sleep is a basic part of good health. When your sleep suffers, your physical health, mood, and your thoughts can suffer too. You may find yourself feeling more grumpy or stressed. Not getting enough sleep also can lead to serious problems, including injury, accidents, anxiety, and depression.  What might cause poor sleeping?  Many things can cause sleep problems, including:  Changes to your sleep schedule.  Stress. Stress can be caused by fear about a single event, such as giving a " "speech. Or you may have ongoing stress, such as worry about work or school.  Depression, anxiety, and other mental or emotional conditions.  Changes in your sleep habits or surroundings. This includes changes that happen where you sleep, such as noise, light, or sleeping in a different bed. It also includes changes in your sleep pattern, such as having jet lag or working a late shift.  Health problems, such as pain, breathing problems, and restless legs syndrome.  Lack of regular exercise.  Using alcohol, nicotine, or caffeine before bed.  How can you help yourself?  Here are some tips that may help you sleep more soundly and wake up feeling more refreshed.  Your sleeping area   Use your bedroom only for sleeping and sex. A bit of light reading may help you fall asleep. But if it doesn't, do your reading elsewhere in the house. Try not to use your TV, computer, smartphone, or tablet while you are in bed.  Be sure your bed is big enough to stretch out comfortably, especially if you have a sleep partner.  Keep your bedroom quiet, dark, and cool. Use curtains, blinds, or a sleep mask to block out light. To block out noise, use earplugs, soothing music, or a \"white noise\" machine.  Your evening and bedtime routine   Create a relaxing bedtime routine. You might want to take a warm shower or bath, or listen to soothing music.  Go to bed at the same time every night. And get up at the same time every morning, even if you feel tired.  What to avoid   Limit caffeine (coffee, tea, caffeinated sodas) during the day, and don't have any for at least 6 hours before bedtime.  Avoid drinking alcohol before bedtime. Alcohol can cause you to wake up more often during the night.  Try not to smoke or use tobacco, especially in the evening. Nicotine can keep you awake.  Limit naps during the day, especially close to bedtime.  Avoid lying in bed awake for too long. If you can't fall asleep or if you wake up in the middle of the night and " "can't get back to sleep within about 20 minutes, get out of bed and go to another room until you feel sleepy.  Avoid taking medicine right before bed that may keep you awake or make you feel hyper or energized. Your doctor can tell you if your medicine may do this and if you can take it earlier in the day.  If you can't sleep   Imagine yourself in a peaceful, pleasant scene. Focus on the details and feelings of being in a place that is relaxing.  Get up and do a quiet or boring activity until you feel sleepy.  Avoid drinking any liquids before going to bed to help prevent waking up often to use the bathroom.  Where can you learn more?  Go to https://www.Tactile.net/patiented  Enter J942 in the search box to learn more about \"Learning About Sleeping Well.\"  Current as of: July 10, 2023  Content Version: 14.1    2840-2553 [employer].   Care instructions adapted under license by your healthcare professional. If you have questions about a medical condition or this instruction, always ask your healthcare professional. [employer] disclaims any warranty or liability for your use of this information.    Substance Use Disorder: Care Instructions  Overview     You can improve your life and health by stopping your use of alcohol or drugs. When you don't drink or use drugs, you may feel and sleep better. You may get along better with your family, friends, and coworkers. There are medicines and programs that can help with substance use disorder.  How can you care for yourself at home?  Here are some ways to help you stay sober and prevent relapse.  If you have been given medicine to help keep you sober or reduce your cravings, be sure to take it exactly as prescribed.  Talk to your doctor about programs that can help you stop using drugs or drinking alcohol.  Do not keep alcohol or drugs in your home.  Plan ahead. Think about what you'll say if other people ask you to drink or use drugs. Try not to " spend time with people who drink or use drugs.  Use the time and money spent on drinking or drugs to do something that's important to you.  Preventing a relapse  Have a plan to deal with relapse. Learn to recognize changes in your thinking that lead you to drink or use drugs. Get help before you start to drink or use drugs again.  Try to stay away from situations, friends, or places that may lead you to drink or use drugs.  If you feel the need to drink alcohol or use drugs again, seek help right away. Call a trusted friend or family member. Some people get support from organizations such as Narcotics Anonymous or [employer] or from treatment facilities.  If you relapse, get help as soon as you can. Some people make a plan with another person that outlines what they want that person to do for them if they relapse. The plan usually includes how to handle the relapse and who to notify in case of relapse.  Don't give up. Remember that a relapse doesn't mean that you have failed. Use the experience to learn the triggers that lead you to drink or use drugs. Then quit again. Recovery is a lifelong process. Many people have several relapses before they are able to quit for good.  Follow-up care is a key part of your treatment and safety. Be sure to make and go to all appointments, and call your doctor if you are having problems. It's also a good idea to know your test results and keep a list of the medicines you take.  When should you call for help?   Call 911  anytime you think you may need emergency care. For example, call if you or someone else:    Has overdosed or has withdrawal signs. Be sure to tell the emergency workers that you are or someone else is using or trying to quit using drugs. Overdose or withdrawal signs may include:  Losing consciousness.  Seizure.  Seeing or hearing things that aren't there (hallucinations).     Is thinking or talking about suicide or harming others.   Where to get help 24 hours a  "day, 7 days a week   If you or someone you know talks about suicide, self-harm, a mental health crisis, a substance use crisis, or any other kind of emotional distress, get help right away. You can:    Call the Suicide and Crisis Lifeline at 988.     Call 2-001-116-TALK (1-109.254.1169).     Text HOME to 596753 to access the Crisis Text Line.   Consider saving these numbers in your phone.  Go to [employer] for more information or to chat online.  Call your doctor now or seek immediate medical care if:    You are having withdrawal symptoms. These may include nausea or vomiting, sweating, shakiness, and anxiety.   Watch closely for changes in your health, and be sure to contact your doctor if:    You have a relapse.     You need more help or support to stop.   Where can you learn more?  Go to https://www.Ideal Network.net/patiented  Enter H573 in the search box to learn more about \"Substance Use Disorder: Care Instructions.\"  Current as of: November 15, 2023               Content Version: 14.0    8936-9989 [employer].   Care instructions adapted under license by your healthcare professional. If you have questions about a medical condition or this instruction, always ask your healthcare professional. [employer] disclaims any warranty or liability for your use of this information.         "

## 2024-08-12 NOTE — PROGRESS NOTES
Preventive Care Visit  [facility]  TIERRA Dickens CNP, Nurse Practitioner - Family  Aug 12, 2024      Assessment & Plan     Encounter for Medicare annual wellness exam  Medicare wellness exam completed today.  Overall doing well.  Paperwork completed for group home.    Hyperlipidemia, unspecified hyperlipidemia type  Often refuses statin.  Encouraged him to take this on a regular basis rechecking cholesterol today.  - Lipid panel reflex to direct LDL Non-fasting; Future  - Lipid panel reflex to direct LDL Non-fasting    Cervical cord compression with myelopathy (H)  Symptoms are stable has followed up with spine.    Drug dependence, in remission (H)  Has been sober for many years, continues to use marijuana    Mild persistent asthma without complication  Albuterol inhaler is available for patient.  He has intermittent asthma symptoms.  Symptoms are worse when he smokes.    Slow transit constipation  Encouraged stool softener and use of fiber to help with symptoms.     Gastroesophageal reflux disease without esophagitis  Okay to use otc/ prescribed medications.     Constipation, unspecified constipation type  As above  - Basic metabolic panel  (Ca, Cl, CO2, Creat, Gluc, K, Na, BUN); Future  - CBC with platelets; Future  - calcium polycarbophil (FIBERCON) 625 MG tablet; Take 1-2 tablets (625-1,250 mg) by mouth at bedtime  - Basic metabolic panel  (Ca, Cl, CO2, Creat, Gluc, K, Na, BUN)  - CBC with platelets    Transient ischemic attack  History of, encouraged asa and crestor use.   - aspirin (ASPIRIN LOW DOSE) 81 MG chewable tablet; Take 1 tablet daily in the evening    Medication management  Drawn for psych for management/ medications.   - Hemoglobin A1c; Future  - Hemoglobin A1c    Patient has been advised of split billing requirements and indicates understanding: Yes        Counseling  Appropriate preventive services were addressed with this patient via screening, questionnaire, or  discussion as appropriate for fall prevention, nutrition, physical activity, Tobacco-use cessation, weight loss and cognition.  Checklist reviewing preventive services available has been given to the patient.  Reviewed patient's diet, addressing concerns and/or questions.   He is at risk for lack of exercise and has been provided with information to increase physical activity for the benefit of his well-being.   Patient is at risk for social isolation and has been provided with information about the benefit of social connection.   He is at risk for psychosocial distress and has been provided with information to reduce risk.   Discussed possible causes of fatigue. Updated plan of care.  Patient reported difficulty with activities of daily living were addressed today.      Subjective   JJOSE CARLOS is a 54 year old, presenting for the following:  Physical          Health Care Directive  Patient has a Health Care Directive on file  Advance care planning document is on file but is outdated.  Patient encouraged to update.    HPI  Continues to [facility] at the [facility]. He is doing well overall. He continues to require some shaving down of the callouses on the toes.             8/12/2024   General Health   How would you rate your overall physical health? Good   Feel stress (tense, anxious, or unable to sleep) Only a little      (!) STRESS CONCERN      8/12/2024   Nutrition   Diet: Regular (no restrictions)            8/12/2024   Exercise   Days per week of moderate/strenous exercise 2 days      (!) EXERCISE CONCERN      8/12/2024   Social Factors   Frequency of gathering with friends or relatives Never   Worry food won't last until get money to buy more No   Food not last or not have enough money for food? No   Do you have housing? (Housing is defined as stable permanent housing and does not include staying ouside in a car, in a tent, in an abandoned building, in an overnight shelter, or couch-surfing.) Yes   Are you  worried about losing your housing? No   Lack of transportation? No   Unable to get utilities (heat,electricity)? Patient declined      (!) SOCIAL CONNECTIONS CONCERN      8/12/2024   Fall Risk   Fallen 2 or more times in the past year? Yes   Trouble with walking or balance? No              8/12/2024   Activities of Daily Living- Home Safety   Needs help with the following daily activites Transportation    Medication administration   Safety concerns in the home None of the above       Multiple values from one day are sorted in reverse-chronological order         8/12/2024   Dental   Dentist two times every year? Yes            8/12/2024   Hearing Screening   Hearing concerns? None of the above            8/12/2024   Driving Risk Screening   Patient/family members have concerns about driving (!) DECLINE            8/12/2024   General Alertness/Fatigue Screening   Have you been more tired than usual lately? (!) YES            8/12/2024   Urinary Incontinence Screening   Bothered by leaking urine in past 6 months No                     8/12/2024   Substance Use   If I could quit smoking, I would Completely agree   I want to quit somking, worry about health affects Completely agree   Willing to make a plan to quit smoking Completely agree   Willing to cut down before quitting Completely agree   Alcohol more than 3/day or more than 7/wk No   Do you have a current opioid prescription? No   How severe/bad is pain from 1 to 10? 1/10   Do you use any other substances recreationally? (!) CANNABIS PRODUCTS    (!) SYNTHETIC MARIJUANA       Multiple values from one day are sorted in reverse-chronological order     Social History     Tobacco Use    Smoking status: Every Day     Current packs/day: 1.50     Average packs/day: 1.5 packs/day for 23.0 years (34.5 ttl pk-yrs)     Types: Cigarettes    Smokeless tobacco: Never    Tobacco comments:     Slowing down a lot   Vaping Use    Vaping status: Some Days    Substances: Nicotine, CBD    Substance Use Topics    Alcohol use: No    Drug use: No       ASCVD Risk   The 10-year ASCVD risk score (Mart CONTRERAS, et al., 2019) is: 10.9%    Values used to calculate the score:      Age: 54 years      Sex: Male      Is Non- : Yes      Diabetic: No      Tobacco smoker: Yes      Systolic Blood Pressure: 136 mmHg      Is BP treated: No      HDL Cholesterol: 49 mg/dL      Total Cholesterol: 165 mg/dL            Reviewed and updated as needed this visit by Provider   Tobacco  Allergies  Meds  Problems  Med Hx  Surg Hx  Fam Hx            Past Medical History:   Diagnosis Date    Bipolar disorder (H)     Borderline intellectual functioning     Cervical spondylosis with myelopathy 05/05/2022    Diffuse traumatic brain injury (H)     Mild persistent asthma 04/27/2018    Schizophrenia (H)      Past Surgical History:   Procedure Laterality Date    COLONOSCOPY N/A 8/13/2021    Procedure: COLONOSCOPY;  Surgeon: Stewart Monsalve MD;  Location: [facility]    FUSION CERVICAL ANTERIOR ONE LEVEL N/A 5/6/2022    Procedure: Cervical 5 - Cervical 6 anterior cervical decompression and fusion with plate;  Surgeon: Laurie Gage MD;  Location: [facility] OR     Lab work is in process  Labs reviewed in EPIC  BP Readings from Last 3 Encounters:   08/12/24 136/80   08/09/24 (!) 157/74   07/18/24 130/78    Wt Readings from Last 3 Encounters:   08/12/24 77.6 kg (171 lb)   07/18/24 77.1 kg (170 lb)   06/04/24 84.8 kg (187 lb)                  Patient Active Problem List   Diagnosis    Schizophrenia (H)    Positive reaction to tuberculin skin test    Anorexia    Bipolar I disorder, single manic episode (H)    Borderline intellectual functioning    Candidiasis of esophagus (H)    Constipation    Delusional disorder (H)    Drug dependence, in remission (H)    Drug induced subacute dyskinesia    Gastritis due to Helicobacter species    Gastroesophageal reflux disease without esophagitis     Hyperlipidemia    Mild persistent asthma    Nocturnal enuresis    Nondependent cannabis abuse    Pityriasis versicolor    Polyp of colon    Psychophysiological insomnia    Rectal hemorrhage    Rhinitis, allergic    Schizoaffective disorder, bipolar type (H)    Transient ischemic attack    Urinary urgency    Verruca plantaris    History of Helicobacter pylori infection    Cervical spondylosis with myelopathy    Lumbar pain    Cervical radiculopathy    Cervical cord compression with myelopathy (H)     Past Surgical History:   Procedure Laterality Date    COLONOSCOPY N/A 8/13/2021    Procedure: COLONOSCOPY;  Surgeon: Stewart Monsalve MD;  Location: [facility]    FUSION CERVICAL ANTERIOR ONE LEVEL N/A 5/6/2022    Procedure: Cervical 5 - Cervical 6 anterior cervical decompression and fusion with plate;  Surgeon: Laurie Gage MD;  Location: [facility] OR       Social History     Tobacco Use    Smoking status: Every Day     Current packs/day: 1.50     Average packs/day: 1.5 packs/day for 23.0 years (34.5 ttl pk-yrs)     Types: Cigarettes    Smokeless tobacco: Never    Tobacco comments:     Slowing down a lot   Substance Use Topics    Alcohol use: No     Family History   Problem Relation Age of Onset    Diabetes Mother     Coronary Artery Disease Mother     Coronary Artery Disease Father     Cancer No family hx of          Current Outpatient Medications   Medication Sig Dispense Refill    acetaminophen (TYLENOL) 325 MG tablet Take 3 tablets (975 mg) by mouth every 8 hours as needed for pain 90 tablet 1    albuterol (PROAIR HFA/PROVENTIL HFA/VENTOLIN HFA) 108 (90 Base) MCG/ACT inhaler 2 PUFFS EVERY 4 HOURS AS NEEDED FOR SHORTNESS OF BREATH 18 g 1    aspirin (ASPIRIN LOW DOSE) 81 MG chewable tablet Take 1 tablet daily in the evening 28 tablet 11    azelastine (ASTELIN) 0.1 % nasal spray Spray 1 spray into both nostrils 2 times daily As needed for nasal congestion 30 mL 5    calcium carbonate (TUMS) 500 MG chewable tablet  Take 2 tablets (1,000 mg) by mouth 2 times daily as needed for heartburn 180 tablet 3    calcium polycarbophil (FIBERCON) 625 MG tablet Take 1-2 tablets (625-1,250 mg) by mouth at bedtime 60 tablet 11    carboxymethylcellulose PF (GOODSENSE LUBRICATING EYE DROP) 0.5 % ophthalmic solution Place 1 drop into both eyes 3 times daily as needed for dry eyes 30 each 4    cetirizine (ZYRTEC) 10 MG tablet Take 1 tablet (10 mg) by mouth daily Take at bedtime 90 tablet 1    dextran 70-hypromellose (TEARS NATURALE FREE PF) 0.1-0.3 % ophthalmic solution Place 1 drop into both eyes daily as needed (as needed for dry eyes) 35 each 11    diphenhydrAMINE (BENADRYL) 25 MG tablet Take 1 tablet (25 mg) by mouth every 6 hours as needed for itching or allergies 30 tablet 0    EPINEPHrine (ANY BX GENERIC EQUIV) 0.3 MG/0.3ML injection 2-pack Inject the contents of one device into the muscle as needed for anaphylaxis; may repeat one time in 5-15 minutes if response to initial dose is inadequate 2 each 1    famotidine (PEPCID) 20 MG tablet Take 1 tablet (20 mg) by mouth 2 times daily as needed (for symptoms of reflux) 180 tablet 3    ketoconazole (NIZORAL) 2 % external shampoo Apply topically to chest and back at least 15 min prior to shower. Okay to leave on up to 8 hours prior to showering x 7 days. Repeat as needed for tinea versacolor 120 mL 1    ketotifen fumarate 0.035% 0.035 % SOLN ophthalmic solution Place 1 drop into both eyes 2 times daily as needed for itching or dry eyes 5 mL 4    melatonin 3 MG tablet Take 2 tablets (6 mg) by mouth nightly as needed for sleep 90 tablet 3    Menthol-Camphor (ICY HOT ADVANCED PAIN RELIEF) 16-11 % CREA Externally apply 2 g topically 5 x daily PRN (for pain on shoulder for muscle spasm and joint pain) 56 g 0    nicotine polacrilex (NICORETTE) 4 MG gum Place 1 each (4 mg) inside cheek every hour as needed for other (nicotine withdrawal symptoms) 100 each 3    OLANZapine (ZYPREXA) 15 MG tablet Take 15  mg by mouth At Bedtime      OLANZapine (ZYPREXA) 5 MG tablet Take 5 mg by mouth daily as needed for anxiety or agitation      ondansetron (ZOFRAN ODT) 4 MG ODT tab Place 4-8 mg under the tongue      rosuvastatin (CRESTOR) 10 MG tablet Take 1 tab every day in the evening. 90 tablet 3    senna-docusate (SENOKOT-S/PERICOLACE) 8.6-50 MG tablet Take 2 tablets by mouth daily as needed for constipation 30 tablet 2    SM MILK OF MAGNESIA 1200 MG/15ML suspension TAKE 30ML DAILY AS NEEDED FOR CONSTIPATION      gabapentin (NEURONTIN) 100 MG capsule TAKE 1 CAPSULE BY MOUTH 3 TIMES DAILY AS NEEDED FOR NEUROPATHIC PAIN 270 capsule 0    guaiFENesin (ROBITUSSIN) 20 mg/mL liquid Take 10 mLs (200 mg) by mouth every 4 hours as needed for cough (Patient not taking: Reported on 8/12/2024) 473 mL 3     Allergies   Allergen Reactions    Fish Oil Swelling and Other (See Comments)     Facial swelling and sleepiness    Latex      Other reaction(s): Other (see comments)    Mushroom Hives    Olive Oil Unknown and Other (See Comments)    Peanut (Diagnostic)      Other reaction(s): Edema    Penicillins      Other reaction(s): Other (see comments)    Trazodone      Other reaction(s): Other (see comments)    Bees Unknown     Noted in [place] Paperwork     Recent Labs   Lab Test 08/12/24  1419 09/22/23  1350 12/30/22  1610 06/14/22  1115 05/05/22  1817 12/07/21  1151 12/04/20  0000 09/30/20  0000   A1C 5.6  --  5.7* 5.6  --   --  6*  --    LDL  --   --  104* 81  --   --  77  --    HDL  --   --  49 57  --   --  55  --    TRIG  --   --  62 38  --   --  96  --    ALT  --  17 9*  --   --  22 34  --    CR  --  0.98 0.98  --    < > 0.97  --  1.03   GFRESTIMATED  --  >90 >90  --    < > >90  --  85   GFRESTBLACK  --   --   --   --   --   --   --  >90   POTASSIUM  --  4.0 3.3*  --    < > 3.8  --  3.3*   TSH  --   --   --   --   --   --   --  0.6    < > = values in this interval not displayed.      Current providers sharing in care for this patient  "include:  Patient Care Team:  Mayda Albarran APRN CNP as PCP - General (Nurse Practitioner - Family)  Mayda Albarran APRN CNP as Assigned PCP  Ruthann Aguiar PA-C as Assigned Neuroscience Provider    The following health maintenance items are reviewed in Epic and correct as of today:  Health Maintenance   Topic Date Due    HEPATITIS A IMMUNIZATION (1 of 2 - Risk 2-dose series) Never done    Pneumococcal Vaccine: Pediatrics (0 to 5 Years) and At-Risk Patients (6 to 64 Years) (2 of 2 - PCV) 09/17/2019    ZOSTER IMMUNIZATION (1 of 2) Never done    LUNG CANCER SCREENING  Never done    ASTHMA ACTION PLAN  03/08/2022    COVID-19 Vaccine (1 - 2023-24 season) Never done    LIPID  12/30/2023    INFLUENZA VACCINE (1) 09/01/2024    ASTHMA CONTROL TEST  01/18/2025    ANNUAL REVIEW OF HM ORDERS  04/23/2025    NICOTINE/TOBACCO CESSATION COUNSELING Q 1 YR  05/13/2025    MEDICARE ANNUAL WELLNESS VISIT  08/12/2025    COLORECTAL CANCER SCREENING  08/13/2026    GLUCOSE  09/22/2026    DTAP/TDAP/TD IMMUNIZATION (4 - Td or Tdap) 09/17/2028    ADVANCE CARE PLANNING  08/12/2029    HEPATITIS C SCREENING  Completed    HIV SCREENING  Completed    PHQ-2 (once per calendar year)  Completed    HEPATITIS B IMMUNIZATION  Completed    IPV IMMUNIZATION  Aged Out    HPV IMMUNIZATION  Aged Out    MENINGITIS IMMUNIZATION  Aged Out    RSV MONOCLONAL ANTIBODY  Aged Out         Review of Systems  Constitutional, neuro, ENT, endocrine, pulmonary, cardiac, gastrointestinal, genitourinary, musculoskeletal, integument and psychiatric systems are negative, except as otherwise noted.     Objective    Exam  /80 (BP Location: Left arm, Patient Position: Sitting, Cuff Size: Adult Regular)   Pulse 62   Temp 98.4  F (36.9  C) (Tympanic)   Resp 16   Ht 1.854 m (6' 1\")   Wt 77.6 kg (171 lb)   SpO2 98%   BMI 22.56 kg/m     Estimated body mass index is 22.56 kg/m  as calculated from the following:    Height as of this encounter: 1.854 m (6' 1\").    " Weight as of this encounter: 77.6 kg (171 lb).    Physical Exam  GENERAL: alert and no distress  EYES: Eyes grossly normal to inspection, PERRL and conjunctivae and sclerae normal  HENT: ear canals and TM's normal, nose and mouth without ulcers or lesions  NECK: no adenopathy, no asymmetry, masses, or scars  RESP: lungs clear to auscultation - no rales, rhonchi or wheezes  CV: regular rate and rhythm, normal S1 S2, no S3 or S4, no murmur, click or rub, no peripheral edema  ABDOMEN: soft, nontender, no hepatosplenomegaly, no masses and bowel sounds normal  MS: no gross musculoskeletal defects noted, no edema  SKIN: no suspicious lesions or rashes  NEURO: Normal strength and tone, mentation intact and speech normal  PSYCH: mentation appears normal, affect normal/bright          8/12/2024   Mini Cog   Mini-Cog Not Completed (choose reason) Patient declines                 Signed Electronically by: TIERRA Dickens CNP

## 2024-08-13 ENCOUNTER — TELEPHONE (OUTPATIENT)
Dept: FAMILY MEDICINE | Facility: CLINIC | Age: 54
End: 2024-08-13
Payer: COMMERCIAL

## 2024-08-13 DIAGNOSIS — K59.00 CONSTIPATION, UNSPECIFIED CONSTIPATION TYPE: ICD-10-CM

## 2024-08-13 RX ORDER — CALCIUM POLYCARBOPHIL 625 MG 625 MG/1
2 TABLET ORAL AT BEDTIME
Qty: 60 TABLET | Refills: 11 | Status: SHIPPED | OUTPATIENT
Start: 2024-08-13

## 2024-08-13 NOTE — TELEPHONE ENCOUNTER
Paula calling from [facility] called to get a medication changed.     calcium polycarbophil (FIBERCON) 625 MG tablet Take 1-2 tablets (625-1,250 mg) by mouth at bedtime     Before patient was taking 2 tablets as needed.    If the provider wants to keep the sig as take 1-2 tablets, it has to say as needed.     If the provider want this to be scheduled at bedtime, the sig must have a specific dose. It has to be either 1 tablet or 2 tablets. It cannot be an option between the two since he is in a group home they need a set dose for scheduled meds.     Can either call back [phone] or send a new script.     Demetria Muniz RN on 8/13/2024 at 9:29 AM

## 2024-08-21 DIAGNOSIS — T78.00XA ALLERGY WITH ANAPHYLAXIS DUE TO FOOD: ICD-10-CM

## 2024-08-21 DIAGNOSIS — K21.9 GASTROESOPHAGEAL REFLUX DISEASE WITHOUT ESOPHAGITIS: Primary | ICD-10-CM

## 2024-08-21 DIAGNOSIS — M47.12 CERVICAL SPONDYLOSIS WITH MYELOPATHY: ICD-10-CM

## 2024-08-21 RX ORDER — EPINEPHRINE 0.3 MG/.3ML
INJECTION SUBCUTANEOUS
Qty: 2 EACH | Refills: 1 | Status: SHIPPED | OUTPATIENT
Start: 2024-08-21

## 2024-08-21 RX ORDER — MAGNESIUM HYDROXIDE 1200 MG/15ML
30 LIQUID ORAL DAILY PRN
Qty: 769 ML | Refills: 1 | Status: SHIPPED | OUTPATIENT
Start: 2024-08-21

## 2024-08-21 NOTE — TELEPHONE ENCOUNTER
Routing refill request to provider for review/approval because:  Medication is reported/historical      Last office visit: 8/12/2024 ; last virtual visit: 6/1/2023 with prescribing provider:     Future Office Visit:   Next 5 appointments (look out 90 days)      Sep 09, 2024 1:30 PM  (Arrive by 1:10 PM)  Provider Visit with TIERRA Aguirre CNP  [facility] ([facility] [address] [facility] [address]  [phone]           Epi pen refill.  Prescription approved per [facility] Refill Protocol.  Julie Behrendt RN

## 2024-08-21 NOTE — TELEPHONE ENCOUNTER
SM MILK OF MAGNESIA 1200 MG/15ML suspension    EPINEPHrine (ANY BX GENERIC EQUIV) 0.3 MG/0.3ML injection 2-pack

## 2024-08-22 RX ORDER — ACETAMINOPHEN 325 MG/1
975 TABLET ORAL EVERY 8 HOURS PRN
Qty: 90 TABLET | Refills: 1 | Status: SHIPPED | OUTPATIENT
Start: 2024-08-22

## 2024-08-22 NOTE — TELEPHONE ENCOUNTER
Pending Prescriptions:                       Disp   Refills    acetaminophen (TYLENOL) 325 MG tablet     90 tab*1            Sig: Take 3 tablets (975 mg) by mouth every 8 hours as           needed for pain.    Routing refill request to provider for review/approval because:   Medication matches indication       Nikolai Norton RN

## 2024-09-04 PROBLEM — M54.50 LUMBAR PAIN: Status: RESOLVED | Noted: 2024-06-11 | Resolved: 2024-09-04

## 2024-09-04 PROBLEM — M54.12 CERVICAL RADICULOPATHY: Status: RESOLVED | Noted: 2024-06-11 | Resolved: 2024-09-04

## 2024-09-04 NOTE — PROGRESS NOTES
PHYSICAL THERAPY DISCHARGE NOTE    Assessment: DEEPTHI has met all his therapy goals at this time. He received a spinal injection which resolved all his pain. Primary goal at this point is to continue with HEP to help maintain progress and strength he had gained.       DISCHARGE  Reason for Discharge: Patient has met all goals.    Equipment Issued: THERABAND    Discharge Plan: Patient to continue home program.    Referring Provider:  Ruthann Aguiar       07/17/24 0500   Appointment Info   Signing clinician's name / credentials Dahlia Bolton, PT DPT   Total/Authorized Visits 365  [facility] medicare   Visits Used 5   Medical Diagnosis Lumbar pain (M54.50), Cervical radiculopathy (M54.12)   PT Tx Diagnosis neck pain and low back pain   Quick Adds Certification   Progress Note/Certification   Start of Care Date 06/11/24   Therapy Frequency 1x/week   Predicted Duration 8 weeks   Certification date from 06/11/24   Certification date to 08/06/24   Progress Note Completed Date 06/11/24   GOALS   PT Goals 2;3;4   PT Goal 1   Goal Identifier 1   Goal Description Patient will improve cervical rotation to 60 B with no neck pain in order to look over his shoulder and turn to talk to people.   Goal Progress 60 B, some stiffness but no pain since injection   Target Date 07/09/24   Date Met 07/17/24   PT Goal 2   Goal Identifier 2   Goal Description Patient will be independent in HEP in order to improve strength, ROM and pain outside of PT.   Goal Progress doing exercises a couple times per week   Target Date 07/09/24   Date Met 06/27/24   PT Goal 3   Goal Identifier 3   Goal Description Patient will be able to stand up straight for 5+ minutes with back pain below 6/10 in order to complete ADL chores.   Goal Progress [place] around for over 5 with no pain   Target Date 08/06/24   Date Met 07/17/24   PT Goal 4   Goal Identifier 4   Goal Description Patient will improve hip extension to 0 B in order to  improve hip flexor flexibility for walking and standing tasks with less pain.   Goal Progress hip ext to neutral B   Target Date 08/06/24   Date Met 07/17/24   Subjective Report   Subjective Report 0/10 pain level today. Got injection in the neck about 2 weeks ago and resolved all his pain.  [place] around for quiet a bit with no issues.   Objective Measure 1   Objective Measure hip ext   Details to 0 B   Objective Measure 2   Objective Measure deep neck flexor   Details 10 sec   Objective Measure 3   Objective Measure cervical rotation   Details 60 B no pain, just pulling in UT   Therapeutic Procedure/Exercise   Therapeutic Procedures: strength, endurance, ROM, flexibility minutes (86002) 23   Ther Proc 4 levator stretch   Ther Proc 4 - Details additional instruction 30 sec x 2 B   PTRx Ther Proc 2 bridge   PTRx Ther Proc 2 - Details x 10   PTRx Ther Proc 3  Hip Flexor Stretch Amrit Test Position   PTRx Ther Proc 3 - Details additional instruction; 30 sec hold, cues to scoot closer to edge of table to complete.   PTRx Ther Proc 4 Supine Cervical Retraction   PTRx Ther Proc 4 - Details 5 count hold; x10 reps cues to keep chin tucked before lifting   PTRx Ther Proc 5 Head Movements Sitting   PTRx Ther Proc 5 - Details 5 count hold; x10 reps   PTRx Ther Proc 6 Seated Hamstring Stretch   PTRx Ther Proc 6 - Details 30 sec hold x 2 B   PTRx Ther Proc 9 Shoulder Theraband Rows   PTRx Ther Proc 9 - Details green TB x 20 reps cues to look up with neck   Ther Proc 5 sit <> stand   Ther Proc 5 - Details x 10 cues to sit slowly   Education   Education Comments needs assistance to recall exercises; look at pictures at same time as doing ex; encouraged him to ask staff at [facility] to help   Plan   Home program above modifications and additions   Updates to plan of care 1x/wk   Plan for next session d/c chart, pt to continue with HEP   Comments   Comments JT has met all his therapy goals at  this time. He received a spinal injection which resolved all his pain.  Primary goal at this point is to continue with HEP to help maintain progress and strength he had gained.   Total Session Time   Timed Code Treatment Minutes 23   Total Treatment Time (sum of timed and untimed services) 23         Dahlia Bolton  PT, DPT       9/4/2024   [address] [address]   [email or web address]  Voicemail: [phone]

## 2024-09-09 ENCOUNTER — OFFICE VISIT (OUTPATIENT)
Dept: FAMILY MEDICINE | Facility: CLINIC | Age: 54
End: 2024-09-09
Payer: COMMERCIAL

## 2024-09-09 VITALS
WEIGHT: 181.8 LBS | DIASTOLIC BLOOD PRESSURE: 80 MMHG | HEIGHT: 73 IN | RESPIRATION RATE: 20 BRPM | TEMPERATURE: 97 F | SYSTOLIC BLOOD PRESSURE: 136 MMHG | BODY MASS INDEX: 24.09 KG/M2 | HEART RATE: 66 BPM | OXYGEN SATURATION: 99 %

## 2024-09-09 DIAGNOSIS — F17.200 NICOTINE DEPENDENCE, UNCOMPLICATED, UNSPECIFIED NICOTINE PRODUCT TYPE: Primary | ICD-10-CM

## 2024-09-09 DIAGNOSIS — L84 CALLUS OF FOOT: Primary | ICD-10-CM

## 2024-09-09 DIAGNOSIS — F17.200 NICOTINE DEPENDENCE, UNCOMPLICATED, UNSPECIFIED NICOTINE PRODUCT TYPE: ICD-10-CM

## 2024-09-09 PROCEDURE — 99214 OFFICE O/P EST MOD 30 MIN: CPT | Performed by: NURSE PRACTITIONER

## 2024-09-09 RX ORDER — BUPROPION HYDROCHLORIDE 150 MG/1
150 TABLET, FILM COATED, EXTENDED RELEASE ORAL 2 TIMES DAILY
Qty: 60 TABLET | Refills: 2 | Status: SHIPPED | OUTPATIENT
Start: 2024-10-09

## 2024-09-09 RX ORDER — BUPROPION HYDROCHLORIDE 150 MG/1
TABLET, FILM COATED, EXTENDED RELEASE ORAL
Qty: 57 TABLET | Refills: 0 | Status: SHIPPED | OUTPATIENT
Start: 2024-09-09 | End: 2024-10-09

## 2024-09-09 ASSESSMENT — PAIN SCALES - GENERAL: PAINLEVEL: MODERATE PAIN (5)

## 2024-09-09 NOTE — PROGRESS NOTES
Assessment & Plan     Callus of foot  Pared callous on 2nd toes and bilateral heels today with #15 scalpel. He tolerated well and expressed improvement in foot pressure with the removal of the callous. He has been doing well with having this reduced every 4-6 weeks.     Nicotine dependence, uncomplicated, unspecified nicotine product type  He desires smoking cessation. Will try wellbutrin today. Recommend monitoring mental health closely as it can be a little more stimulating and to assure his anxiety doesn't worsen with the medication. I personally spent 6 minuets in conversation with patient today on smoking cessation and methods.   - [place]; Future  - buPROPion (ZYBAN) 150 MG 12 hr tablet; Take 1 tablet (150 mg) by mouth every morning for 3 days, THEN 1 tablet (150 mg) 2 times daily for 27 days. Take 2nd dose no later than 4pm.  - buPROPion (ZYBAN) 150 MG 12 hr tablet; Take 1 tablet (150 mg) by mouth 2 times daily. After your quit date treatment generally continues 7-12 weeks. Take your afternoon dose no later than 4pm        Funmilayo LACEY is a 54 year old, presenting for the following health issues:  Feet Issues (Removal of Calluses on both feet ) and Medication Reconciliation (Review all medications to make sure he is doing ok. )        9/9/2024     1:26 PM   Additional Questions   Roomed by Robyn Tripathi CMA   Accompanied by Staff - Nick     History of Present Illness       Reason for visit:  Feet and discuss meds    He eats 2-3 servings of fruits and vegetables daily.He consumes 5 sweetened beverage(s) daily.He exercises with enough effort to increase his heart rate 10 to 19 minutes per day.  He exercises with enough effort to increase his heart rate 4 days per week.   He is taking medications regularly.     He has been taking his medications every day as discussed from his last visit. He is tolerating well and feeling well.     He would like to quit smoking. He is motivated and  "would like to try the medication for this.     He would also like his calloses pared down today. He reports that these have been ongoing bothersome for him and has done well with these being treated monthly or so.     No other questions or concerns today.         Review of Systems  Constitutional, HEENT, cardiovascular, pulmonary, gi and gu systems are negative, except as otherwise noted.      Objective    /80   Pulse 66   Temp 97  F (36.1  C) (Tympanic)   Resp 20   Ht 1.854 m (6' 1\")   Wt 82.5 kg (181 lb 12.8 oz)   SpO2 99%   BMI 23.99 kg/m    Body mass index is 23.99 kg/m .  Physical Exam   GENERAL: alert and no distress  SKIN: Calluses on bilateral second toes at the end as well as bilateral heels medial aspect. Pared down with #15 scalpel until relief noted by patient.  Small callus on bottom of left foot removal improves symptoms.  This is a dark area on his foot.            Signed Electronically by: TIERRA Dickens CNP    "

## 2024-09-10 DIAGNOSIS — F17.200 NICOTINE DEPENDENCE, UNCOMPLICATED, UNSPECIFIED NICOTINE PRODUCT TYPE: ICD-10-CM

## 2024-09-10 RX ORDER — BUPROPION HYDROCHLORIDE 150 MG/1
150 TABLET, FILM COATED, EXTENDED RELEASE ORAL 2 TIMES DAILY
Qty: 56 TABLET | Refills: 2 | Status: SHIPPED | OUTPATIENT
Start: 2024-09-10 | End: 2025-04-25

## 2024-09-10 RX ORDER — BUPROPION HYDROCHLORIDE 150 MG/1
TABLET, FILM COATED, EXTENDED RELEASE ORAL
Qty: 56 TABLET | Refills: 2 | Status: SHIPPED | OUTPATIENT
Start: 2024-09-10 | End: 2024-09-10

## 2024-09-10 NOTE — TELEPHONE ENCOUNTER
Medication Question or Refill        What medication are you calling about (include dose and sig)?: Pending Prescriptions:                       Disp   Refills    buPROPion (ZYBAN) 150 MG 12 hr tablet     56 tab*2            Preferred Pharmacy:       [address]  [address] [address]  Phone: [phone] Fax: [phone]      Controlled Substance Agreement on file:   CSA -- Patient Level:    CSA: None found at the patient level.       Who prescribed the medication?: Mayda Albarran      Do you have any questions or concerns?  Yes: Pharmacy calling to have end date added to prescription.      Could we send this information to you in [employer] or would you prefer to receive a phone call?:   Patient would prefer a phone call   Okay to leave a detailed message?: Yes at Other phone number:  Paula at [facility] [phone]

## 2024-09-23 DIAGNOSIS — T78.00XA ALLERGY WITH ANAPHYLAXIS DUE TO FOOD: ICD-10-CM

## 2024-09-23 RX ORDER — DIPHENHYDRAMINE HCL 25 MG
TABLET ORAL
Qty: 30 TABLET | Refills: 0 | Status: SHIPPED | OUTPATIENT
Start: 2024-09-23

## 2024-10-28 ENCOUNTER — OFFICE VISIT (OUTPATIENT)
Dept: FAMILY MEDICINE | Facility: CLINIC | Age: 54
End: 2024-10-28
Payer: COMMERCIAL

## 2024-10-28 VITALS
SYSTOLIC BLOOD PRESSURE: 124 MMHG | WEIGHT: 177 LBS | DIASTOLIC BLOOD PRESSURE: 74 MMHG | TEMPERATURE: 98.8 F | HEART RATE: 69 BPM | BODY MASS INDEX: 23.35 KG/M2 | OXYGEN SATURATION: 97 %

## 2024-10-28 DIAGNOSIS — T14.8XXA OPEN WOUND: ICD-10-CM

## 2024-10-28 DIAGNOSIS — F25.0 SCHIZOAFFECTIVE DISORDER, BIPOLAR TYPE (H): ICD-10-CM

## 2024-10-28 DIAGNOSIS — F17.200 NICOTINE DEPENDENCE, UNCOMPLICATED, UNSPECIFIED NICOTINE PRODUCT TYPE: Primary | ICD-10-CM

## 2024-10-28 DIAGNOSIS — M54.9 UPPER BACK PAIN: ICD-10-CM

## 2024-10-28 DIAGNOSIS — G89.29 CHRONIC BILATERAL LOW BACK PAIN WITH LEFT-SIDED SCIATICA: ICD-10-CM

## 2024-10-28 DIAGNOSIS — M54.42 CHRONIC BILATERAL LOW BACK PAIN WITH LEFT-SIDED SCIATICA: ICD-10-CM

## 2024-10-28 DIAGNOSIS — L84 CALLUS OF FOOT: ICD-10-CM

## 2024-10-28 DIAGNOSIS — F22 DELUSIONAL DISORDER (H): ICD-10-CM

## 2024-10-28 DIAGNOSIS — R41.83 BORDERLINE INTELLECTUAL FUNCTIONING: ICD-10-CM

## 2024-10-28 PROCEDURE — 99214 OFFICE O/P EST MOD 30 MIN: CPT | Mod: 25 | Performed by: NURSE PRACTITIONER

## 2024-10-28 PROCEDURE — G0008 ADMIN INFLUENZA VIRUS VAC: HCPCS | Performed by: NURSE PRACTITIONER

## 2024-10-28 PROCEDURE — 90673 RIV3 VACCINE NO PRESERV IM: CPT | Performed by: NURSE PRACTITIONER

## 2024-10-28 PROCEDURE — 90480 ADMN SARSCOV2 VAC 1/ONLY CMP: CPT | Performed by: NURSE PRACTITIONER

## 2024-10-28 PROCEDURE — 91320 SARSCV2 VAC 30MCG TRS-SUC IM: CPT | Performed by: NURSE PRACTITIONER

## 2024-10-28 RX ORDER — CYCLOBENZAPRINE HCL 10 MG
10 TABLET ORAL 3 TIMES DAILY PRN
Qty: 30 TABLET | Refills: 1 | Status: CANCELLED | OUTPATIENT
Start: 2024-10-28

## 2024-10-28 RX ORDER — NICOTINE 21 MG/24HR
1 PATCH, TRANSDERMAL 24 HOURS TRANSDERMAL EVERY 24 HOURS
Qty: 42 PATCH | Refills: 0 | Status: SHIPPED | OUTPATIENT
Start: 2024-10-28 | End: 2024-10-28

## 2024-10-28 RX ORDER — PREDNISONE 20 MG/1
40 TABLET ORAL DAILY
Qty: 10 TABLET | Refills: 0 | Status: SHIPPED | OUTPATIENT
Start: 2024-10-28 | End: 2024-10-28

## 2024-10-28 RX ORDER — NICOTINE 21 MG/24HR
1 PATCH, TRANSDERMAL 24 HOURS TRANSDERMAL EVERY 24 HOURS
Qty: 14 PATCH | Refills: 0 | Status: SHIPPED | OUTPATIENT
Start: 2024-12-09 | End: 2024-10-28

## 2024-10-28 RX ORDER — PREDNISONE 20 MG/1
40 TABLET ORAL DAILY
Qty: 10 TABLET | Refills: 0 | Status: SHIPPED | OUTPATIENT
Start: 2024-10-28

## 2024-10-28 RX ORDER — NICOTINE 21 MG/24HR
1 PATCH, TRANSDERMAL 24 HOURS TRANSDERMAL EVERY 24 HOURS
Qty: 14 PATCH | Refills: 0 | Status: SHIPPED | OUTPATIENT
Start: 2024-12-09 | End: 2024-11-13

## 2024-10-28 RX ORDER — NICOTINE 21 MG/24HR
1 PATCH, TRANSDERMAL 24 HOURS TRANSDERMAL EVERY 24 HOURS
Qty: 42 PATCH | Refills: 0 | Status: SHIPPED | OUTPATIENT
Start: 2024-10-28 | End: 2024-11-11

## 2024-10-28 ASSESSMENT — PAIN SCALES - GENERAL: PAINLEVEL_OUTOF10: SEVERE PAIN (6)

## 2024-10-28 NOTE — PROGRESS NOTES
Assessment & Plan     Nicotine dependence, uncomplicated, unspecified nicotine product type  Continue with wellbutrin. Add in using nicotine patch  - [place]; Future  - nicotine (NICODERM CQ) 21 MG/24HR 24 hr patch; Place 1 patch over 24 hours onto the skin every 24 hours.  - nicotine (NICODERM CQ) 14 MG/24HR 24 hr patch; Place 1 patch over 24 hours onto the skin every 24 hours for 14 days.  - nicotine (NICODERM CQ) 7 MG/24HR 24 hr patch; Place 1 patch over 24 hours onto the skin every 24 hours for 14 days.    Upper back pain  No gaurding on palpation. Will start with prednisone with efforts at reducing the acute back pain symptoms. Follow up if not improving. He has a back specialist and has been advised to avoid lifting.     Chronic bilateral low back pain with left-sided sciatica  As above.   - predniSONE (DELTASONE) 20 MG tablet; Take 2 tablets (40 mg) by mouth daily for 5 days.    Borderline intellectual functioning  Delusional disorder (H)  Schizoaffective disorder, bipolar type (H)  Stable. Doing well.     Callus of foot  Pared down with #15. Improvement with callous reduction. Follow up in 4-6 weeks for management again / as needed.     Open wound  Vaseline okay to use. No acute infection is present. Discussed management and care as well as avoiding burns.       Subjective   DEEPTHI is a 54 year old, presenting for the following health issues:  Derm Problem        10/28/2024    12:12 PM   Additional Questions   Roomed by Shivani POWELL CMA   Accompanied by Self     HPI     - Low back pain. He notes that injections have helped for this in the past. This hurts since moving. It feels like he is ready to fall and collapse. Pain is present and will weak. He reports that the back will hurt when he lays down.     - Tobacco cessation. He is taking Zyban 150mg bid but notes that this is not helping for cravings. He is currently smoking cigarettes, 2.5ppd.    - Left ear drainage and pain. No other cold sx.    -  Open wounds on bilateral fingers.    - He notes that he has moved out of group home is now on his own. He is waiting to get a job. He has concerns if he should continue being seen here as he is living 1.5 hours away ands is dealing with transpotation services.      Review of Systems  Constitutional, HEENT, cardiovascular, pulmonary, gi and gu systems are negative, except as otherwise noted.      Objective    /74   Pulse 69   Temp 98.8  F (37.1  C) (Temporal)   Wt 80.3 kg (177 lb)   SpO2 97%   BMI 23.35 kg/m    Body mass index is 23.35 kg/m .  Physical Exam   GENERAL: alert and no distress  EYES: Eyes grossly normal to inspection, PERRL and conjunctivae and sclerae normal  RESP: lungs clear to auscultation - no rales, rhonchi or wheezes  CV: regular rate and rhythm, normal S1 S2, no S3 or S4, no murmur, click or rub, no peripheral edema  MS: midline pain at shoulder blades, some muscle spasm present. Straight leg raise is slightly positive bilaterally.   SKIN: abrasion on upper back and burns on bilateral thumbs.   NEURO: Normal strength and tone, mentation intact and speech normal  PSYCH: mentation appears normal, affect normal/bright            Signed Electronically by: TIERRA Dickens CNP

## 2024-10-28 NOTE — PATIENT INSTRUCTIONS
Nicotine Transdermal System   Habitrol, Nicoderm C-Q    Uses  For quitting smoking.    Instructions  DO NOT take this medicine by mouth.    Avoid placing the patch near the breast.    Remove the patch after 24 hours.    Keep the medicine at room temperature. Avoid heat and direct light.    This patch should not be cut.    Wash your hands before and after handling this medicine.    Remove old patch before applying new one. Change the location of the new patch.    If you have vivid dreams or trouble sleeping, you may remove the patch before going to sleep.    Ask your doctor or pharmacist about locations on your body where this patch can be used.    Remove the plastic liner that protects the sticky side of the patch before applying to the skin.    Be sure the area of skin is clean and dry before putting on a new patch.    Apply the patch to a clean, dry, hairless area.    Press the patch firmly for a few seconds to make sure it stays in place.    After removing the patch, fold it together and discard it out of reach of children and pets.    Please ask your doctor or pharmacist how you can safely dispose of used patches.    If the skin under the patch becomes irritated, remove the patch. Do not apply a new patch to the area until the skin feels better.    To avoid irritating your skin, use a different location for a new patch.    Apply the patch only to normal looking skin. Avoid areas of the skin that are red, have scrapes, or damaged.    If the patch falls off, apply a new a patch on a different location of the body.    Please tell your doctor and pharmacist about all the medicines you take. Include both prescription and over-the-counter medicines. Also tell them about any vitamins, herbal medicines, or anything else you take for your health.    If you need to stop this medicine, your doctor may wish to gradually reduce the dosage before stopping.    Do not use more than 1 patch at any one time.    Cautions  Tell  your doctor and pharmacist if you ever had an allergic reaction to a medicine. Symptoms of an allergic reaction can include trouble breathing, skin rash, itching, swelling, or severe dizziness.    Do not use the medication any more than instructed.    Avoid smoking while on this medicine. Smoking may increase your risk for stroke, heart attack, blood clots, high blood pressure, and other diseases of the heart and blood vessels.    Tell the doctor or pharmacist if you are pregnant, planning to be pregnant, or breastfeeding.    Ask your pharmacist if this medicine can interact with any of your other medicines. Be sure to tell them about all the medicines you take.    Please tell all your doctors and dentists that you are on this medicine before they provide care.    Side Effects  The following is a list of some common side effects from this medicine. Please speak with your doctor about what you should do if you experience these or other side effects.    skin irritation where medicine is applied    If you have any of the following side effects, you may be getting too much medicine. Please contact your doctor to let them know about these side effects.    diarrhea  dizziness  nausea  rapid heartbeat  vomiting    A few people may have an allergic reaction to this medicine. Symptoms can include difficulty breathing, skin rash, itching, swelling, or severe dizziness. If you notice any of these symptoms, seek medical help quickly.    Extra  Please speak with your doctor, nurse, or pharmacist if you have any questions about this medicine.      [email or web address]  IMPORTANT NOTE: This document tells you briefly how to take your medicine, but it does not tell you all there is to know about it. Your doctor or pharmacist may give you other documents about your medicine. Please talk to them if you have any questions. Always follow their advice. There is a more complete description of this medicine  available in English. Scan this code on your smartphone or tablet or use the web address below. You can also ask your pharmacist for a printout. If you have any questions, please ask your pharmacist.   2021 [employer].      2982-3137 [employer]. All rights reserved. This information is not intended as a substitute for professional medical care. Always follow your healthcare professional's instructions.     4 = No assist / stand by assistance

## 2024-11-08 ENCOUNTER — TELEPHONE (OUTPATIENT)
Dept: FAMILY MEDICINE | Facility: CLINIC | Age: 54
End: 2024-11-08
Payer: COMMERCIAL

## 2024-11-08 NOTE — TELEPHONE ENCOUNTER
Patient calling requesting refills prior to switching PCP providers due to location.    Advised patient to schedule an appointment.    Virtual scheduled with PCP for 11/11.    Alexandro LOCKHART RN  [facility]

## 2024-11-11 ENCOUNTER — VIRTUAL VISIT (OUTPATIENT)
Dept: FAMILY MEDICINE | Facility: CLINIC | Age: 54
End: 2024-11-11
Payer: COMMERCIAL

## 2024-11-11 DIAGNOSIS — M54.2 NECK PAIN: ICD-10-CM

## 2024-11-11 DIAGNOSIS — M54.12 CERVICAL RADICULOPATHY: ICD-10-CM

## 2024-11-11 DIAGNOSIS — K21.9 GASTROESOPHAGEAL REFLUX DISEASE WITHOUT ESOPHAGITIS: ICD-10-CM

## 2024-11-11 DIAGNOSIS — F25.0 SCHIZOAFFECTIVE DISORDER, BIPOLAR TYPE (H): ICD-10-CM

## 2024-11-11 DIAGNOSIS — F51.04 PSYCHOPHYSIOLOGICAL INSOMNIA: ICD-10-CM

## 2024-11-11 DIAGNOSIS — M51.16 LUMBAR DISC HERNIATION WITH RADICULOPATHY: Primary | ICD-10-CM

## 2024-11-11 DIAGNOSIS — R41.83 BORDERLINE INTELLECTUAL FUNCTIONING: ICD-10-CM

## 2024-11-11 DIAGNOSIS — K59.01 SLOW TRANSIT CONSTIPATION: ICD-10-CM

## 2024-11-11 DIAGNOSIS — F22 DELUSIONAL DISORDER (H): ICD-10-CM

## 2024-11-11 DIAGNOSIS — M47.12 CERVICAL SPONDYLOSIS WITH MYELOPATHY: ICD-10-CM

## 2024-11-11 PROCEDURE — 99214 OFFICE O/P EST MOD 30 MIN: CPT | Mod: 95 | Performed by: NURSE PRACTITIONER

## 2024-11-11 RX ORDER — AMOXICILLIN 250 MG
2 CAPSULE ORAL DAILY
Qty: 60 TABLET | Refills: 11 | Status: SHIPPED | OUTPATIENT
Start: 2024-11-11

## 2024-11-11 RX ORDER — MAGNESIUM HYDROXIDE 1200 MG/15ML
30 LIQUID ORAL DAILY PRN
Qty: 769 ML | Refills: 1 | Status: CANCELLED | OUTPATIENT
Start: 2024-11-11

## 2024-11-11 RX ORDER — ACETAMINOPHEN 325 MG/1
975 TABLET ORAL EVERY 8 HOURS PRN
Qty: 90 TABLET | Refills: 1 | Status: SHIPPED | OUTPATIENT
Start: 2024-11-11

## 2024-11-11 RX ORDER — GABAPENTIN 300 MG/1
300 CAPSULE ORAL 3 TIMES DAILY
Qty: 90 CAPSULE | Refills: 11 | Status: SHIPPED | OUTPATIENT
Start: 2024-11-11 | End: 2024-11-13

## 2024-11-11 ASSESSMENT — ANXIETY QUESTIONNAIRES
GAD7 TOTAL SCORE: 21
6. BECOMING EASILY ANNOYED OR IRRITABLE: NEARLY EVERY DAY
2. NOT BEING ABLE TO STOP OR CONTROL WORRYING: NEARLY EVERY DAY
IF YOU CHECKED OFF ANY PROBLEMS ON THIS QUESTIONNAIRE, HOW DIFFICULT HAVE THESE PROBLEMS MADE IT FOR YOU TO DO YOUR WORK, TAKE CARE OF THINGS AT HOME, OR GET ALONG WITH OTHER PEOPLE: NOT DIFFICULT AT ALL
7. FEELING AFRAID AS IF SOMETHING AWFUL MIGHT HAPPEN: NEARLY EVERY DAY
1. FEELING NERVOUS, ANXIOUS, OR ON EDGE: NEARLY EVERY DAY
3. WORRYING TOO MUCH ABOUT DIFFERENT THINGS: NEARLY EVERY DAY
GAD7 TOTAL SCORE: 21
5. BEING SO RESTLESS THAT IT IS HARD TO SIT STILL: NEARLY EVERY DAY

## 2024-11-11 ASSESSMENT — PATIENT HEALTH QUESTIONNAIRE - PHQ9
SUM OF ALL RESPONSES TO PHQ QUESTIONS 1-9: 21
5. POOR APPETITE OR OVEREATING: NEARLY EVERY DAY

## 2024-11-11 NOTE — PATIENT INSTRUCTIONS
Gabapentin: start with 300mg at bedtime. Continue with 100mg morning and noon. Then after 3-4 days depending on tolerance. (Fatigue) increase to 300mg two times daily. And 100mg noon. If doing well okay to increase to 300mg three times daily after about 1 week.

## 2024-11-11 NOTE — PROGRESS NOTES
DEEPTHI is a 54 year old who is being evaluated via a billable video visit.    How would you like to obtain your AVS? MyChart  If the video visit is dropped, the invitation should be resent by: Text to cell phone: [phone]  Will anyone else be joining your video visit? No      Assessment & Plan     Cervical spondylosis with myelopathy  Ongoing upper back pain. This is at its current baseline and not worsening. He has done physical therapy intermittent. May benefit from ongoing therapy.   - acetaminophen (TYLENOL) 325 MG tablet; Take 3 tablets (975 mg) by mouth every 8 hours as needed for pain.    Gastroesophageal reflux disease without esophagitis  PRN medications discussed and recommended use as needed.     Slow transit constipation  Okay to transition this as scheduled instead of as needed.   - senna-docusate (SENOKOT-S/PERICOLACE) 8.6-50 MG tablet; Take 2 tablets by mouth daily.    Neck pain  Increasing gabapentin dose for pain management. This may also help with anxiety.   - gabapentin (NEURONTIN) 300 MG capsule; Take 1 capsule (300 mg) by mouth 3 times daily.    Cervical radiculopathy  See above.   - gabapentin (NEURONTIN) 300 MG capsule; Take 1 capsule (300 mg) by mouth 3 times daily.    Psychophysiological insomnia  Refilled melatonin. This has been helping for management of his insomnia.   - melatonin 3 MG tablet; Take 2 tablets (6 mg) by mouth nightly as needed for sleep.  - Adult Mental Health  Referral; Future    Lumbar disc herniation with radiculopathy  MRI showing lumbar disc herniation. Has not seen spine care for this. Symptoms are worsening. Continue with gabapentin. Referral to spine care.  - Spine  Referral; Future    Borderline intellectual functioning  Needs a new psychiatrist. Referral placed for ongoing management of symptoms and impact from his mental health.   - Adult Mental Health  Referral; Future    Delusional disorder (H)  - Adult Mental Health   Referral; Future    Schizoaffective disorder, bipolar type (H)  - Adult Mental Health  Referral; Future          Depression Screening Follow Up        11/11/2024     1:14 PM   PHQ   PHQ-9 Total Score 21   Q9: Thoughts of better off dead/self-harm past 2 weeks Not at all         Follow Up Actions Taken  Crisis resource information provided in After Visit Summary  Mental Health Referral placed           Subjective   DEEPTHI is a 54 year old, presenting for the following health issues:  Pain (Gabapentin and Tylenol ), Sleep Problem (Melatonin refill ), and Constipation (Senna and Milk of Magnesium)        11/11/2024     1:25 PM   Additional Questions   Roomed by Robyn Tripathi CMA   Visit completed with KRYSTYNAJOSE CARLOS and Damion    Video Start Time: 4:46 PM    HPI     Medication Followup of Senna   Taking Medication as prescribed: yes  Side Effects:  None  Medication Helping Symptoms:  yes      Medication Followup of Milk of Magnesium   Taking Medication as prescribed: yes  Side Effects:  None  Medication Helping Symptoms:  yes      Medication Followup of Tylenol   Taking Medication as prescribed: yes  Side Effects:  None  Medication Helping Symptoms:  yes      Medication Followup of Melatonin  Taking Medication as prescribed: yes  Side Effects:  None  Medication Helping Symptoms:  yes      Reports that he needs to establish to a new psychiatrist. He is good on olanzapine    Pain History:  When did you first notice your pain? ongoing   Have you seen this provider for your pain in the past? Yes   Where in your body do you have pain? Shoulder and Right Leg  Are you seeing anyone else for your pain? Yes - [facility]        11/11/2024     1:14 PM   PHQ-9 SCORE   PHQ-9 Total Score 21           11/11/2024     1:14 PM   JOSUE-7 SCORE   Total Score 21           11/11/2024     1:14 PM   PEG Score   PEG Total Score 6.67           Review of Systems  Constitutional, neuro, ENT, endocrine, pulmonary, cardiac, gastrointestinal,  genitourinary, musculoskeletal, integument and psychiatric systems are negative, except as otherwise noted.      Objective           Vitals:  No vitals were obtained today due to virtual visit.    Physical Exam   GENERAL: alert and no distress  EYES: Eyes grossly normal to inspection.  No discharge or erythema, or obvious scleral/conjunctival abnormalities.  RESP: No audible wheeze, cough, or visible cyanosis.    SKIN: Visible skin clear. No significant rash, abnormal pigmentation or lesions.  NEURO: Cranial nerves grossly intact.  Mentation and speech appropriate for age.  PSYCH: Appropriate affect, tone, and pace of words          Video-Visit Details    Type of service:  Video Visit   Video End Time: 5:15 PM  Originating Location (pt. Location): Home    Distant Location (provider location):  On-site  Platform used for Video Visit: Shashi  Signed Electronically by: TIERRA Dickens CNP

## 2024-11-13 ENCOUNTER — TELEPHONE (OUTPATIENT)
Dept: FAMILY MEDICINE | Facility: CLINIC | Age: 54
End: 2024-11-13
Payer: COMMERCIAL

## 2024-11-13 DIAGNOSIS — Z72.0 TOBACCO ABUSE: ICD-10-CM

## 2024-11-13 DIAGNOSIS — M54.12 CERVICAL RADICULOPATHY: ICD-10-CM

## 2024-11-13 DIAGNOSIS — M54.2 NECK PAIN: ICD-10-CM

## 2024-11-13 RX ORDER — GABAPENTIN 100 MG/1
CAPSULE ORAL
Qty: 120 CAPSULE | Refills: 11 | Status: SHIPPED | OUTPATIENT
Start: 2024-11-13 | End: 2024-11-15

## 2024-11-13 NOTE — TELEPHONE ENCOUNTER
RN called Mary back.    Patient reports the eye drops are burning his eyes and declines drops for over a week. Is there an alternative that could be helpful for dry eyes?    2. Patient has been refusing nicotine patches and is smoking again. Requesting  for discontinuation of nicotine patches.    3. Mary would like the order for Mental Health referral to also be faxed to the group home if they choose to establish outside of [place].    Please fax updated orders and mental health referral to Fax: [phone]    Please advise.     Tonja Vang RN on 11/13/2024 at 11:26 AM

## 2024-11-13 NOTE — TELEPHONE ENCOUNTER
Received call from Mary Andrade from home Patient is staying at.  Call back number for Gina personal cell is [phone].  Patient has ketotofen fumarate ordered.  Patient does not want to take drops any more, they burn his eyes.  Needs ordered to discontinue eye drop.  Ronak Navarrete RN

## 2024-11-13 NOTE — TELEPHONE ENCOUNTER
Received call from Abby Andrade regarding Patient and his gabapentin.  Patient was increased to 300 mg from 100 mg on 11/11/24  Yesterday patient was very unsteady and abby was afraid that he would fall.  Today he has slept most of the day.   Only had the 300 mg dose at bed time both days.  Had 100 mg in the morning and afternoon.  Also needs clarification that gabapentin is scheduled not a PRN.  Relayed that orders state  three times a day.  Call back number [phone].  Ronak Navarrete RN

## 2024-11-14 ENCOUNTER — TELEPHONE (OUTPATIENT)
Dept: FAMILY MEDICINE | Facility: CLINIC | Age: 54
End: 2024-11-14

## 2024-11-14 DIAGNOSIS — M54.2 NECK PAIN: ICD-10-CM

## 2024-11-14 DIAGNOSIS — M54.12 CERVICAL RADICULOPATHY: ICD-10-CM

## 2024-11-14 NOTE — TELEPHONE ENCOUNTER
Mary Andrade returned call with questions on medications. Given information as noted below.     She has concerns that Ihsan is not getting medications. They are noted to be PRN and does not ask for them.   Questions on all medications:    She named every medication on his list except Gabapentin and the nicotine (not taking) and is concerned they are all listed as needed.     Please advise on which medications he needs to take not on a PRN basis.     Fax:  [phone]    Mary's cell: [phone]    Carolyn Garcia RN on 11/14/2024 at 9:45 AM

## 2024-11-14 NOTE — TELEPHONE ENCOUNTER
"Staff from  called to request a decrease in gabapentin 300 mg as patient is \"too sedated\" from this dose, staff had to take him into UC today.    On 11/13/24 gabapentin dose was 300 mg po TID, Mary mau/gavin states patient has been only receiving taking the medication in the evening 300 mg, he does not take an AM or NOON dose due to patient refusal.    Per medical assistant-     Patient and group home have been addressing side effects from gabapentin via MyChart, last dosage decrease was yesterday.  They wanted another dosage change.  Explained that this is done via primary care and not urgent care.      They were not sure what the current dose was.  Did print out in AVS with updated dosage.  They said this was satisfactory and will discuss further with primary care if necessary.     Changed the gabapentin to not be as high of dosing.   Reduced the morning and noon dose to 100 and 300mg at bedtime.   Discontinued the eye drop.   I recommend that he assures he is drinking enough water. This could be a source of why his eyes are dry and sore. The drops may naturally burn when going in. If there is one he finds he likes better I can try prescribing it.      Okay to fax changes and the referral I placed at last visit as requested in other phone note.   TIERRA Dickens CNP        Message routed to Mayda Albarran CNP to advise.     Julie Behrendt RN        "

## 2024-11-14 NOTE — TELEPHONE ENCOUNTER
Changed the gabapentin to not be as high of dosing.   Reduced the morning and noon dose to 100 and 300mg at bedtime.   Discontinued the eye drop.   I recommend that he assures he is drinking enough water. This could be a source of why his eyes are dry and sore. The drops may naturally burn when going in. If there is one he finds he likes better I can try prescribing it.     Okay to fax changes and the referral I placed at last visit as requested in other phone note.   Mayda Albarran, TIERRA CNP

## 2024-11-14 NOTE — TELEPHONE ENCOUNTER
The medications that are truly PRN are as needed. He has prescribed medications that are noted to be daily.   What medications is he not getting / taking?  He has a history of declining medications.     TIERRA Dickens CNP

## 2024-11-15 RX ORDER — GABAPENTIN 100 MG/1
CAPSULE ORAL
Qty: 120 CAPSULE | Refills: 11 | Status: SHIPPED | OUTPATIENT
Start: 2024-11-15 | End: 2024-11-15

## 2024-11-15 RX ORDER — GABAPENTIN 100 MG/1
CAPSULE ORAL
Qty: 120 CAPSULE | Refills: 11 | Status: SHIPPED | OUTPATIENT
Start: 2024-11-15

## 2024-11-15 NOTE — TELEPHONE ENCOUNTER
RN called Kaylee and informed her the Gabapentin order was updated.    JOSE FRANCISCO faxed a copy to Kaylee.    Tonja Vang RN on 11/15/2024 at 2:53 PM

## 2024-11-15 NOTE — TELEPHONE ENCOUNTER
RN called and spoke with patient and no staff at the group home was available to discuss gabapentin.    Patient reports staff should be available around 4 pm for clinic to call back to speak with a staff member.     Patient would like Gabapentin prescribed as 9:00 am  take 100 mg PRN; 3:00 pm  take 100 mg PRN; 9 pm take 200 mg PRN.     Patient prefers to have medication PRN instead of scheduled due to sometimes medication feels too strong and makes him too tired.      RN discussed with Mayda Albarran. Mayda Albarran agrees patient should be able to take Gabapenin PRN because it is prescribed for Neck pain and Cervical radiculopathy.       Group home Fax: [phone]      Routing to Mayda Albarran to please review cued up medication.     Tonja Vang RN on 11/15/2024 at 12:07 PM

## 2024-11-15 NOTE — TELEPHONE ENCOUNTER
Kaylee, supervisor at group [place] calling re: Rx for Gabapentin  She would like the sig on the gabapentin changed.   She is wondering if sig could state: 100mg BID PRN during waking hours [id number].   Then for the gabapentin 200mg, she would like this scheduled for 10pm.    Kaylee's cell # [phone]  Fax# [phone]

## 2024-11-15 NOTE — TELEPHONE ENCOUNTER
Are they okay with the new dosage or would they like to try and decrease further?  Mayda Albarran, TIERRA CNP

## 2024-11-15 NOTE — TELEPHONE ENCOUNTER
RN called and spoke with patient and no staff at the group home was available to discuss gabapentin.  Patient reports staff should be available around 4 pm.     Patient would like Gabapentin prescribed as 9:00 am  take 100 mg PRN; 3:00 pm  take 100 mg PRN; 9 pm take 200 mg PRN.    Patient prefers to have medication PRN instead of scheduled due to sometimes medication feels too strong and makes him too tired.     RN discussed with Mayda Albarran. Mayda Albarran agrees patient should be able to take Gabapenin PRN because it is prescribed for Neck pain and Cervical radiculopathy.    Group home Fax: [phone]     Routing to Mayda Albarran to please review cued up medication.    Tonja Vang RN on 11/15/2024 at 12:07 PM

## 2024-12-05 ENCOUNTER — TELEPHONE (OUTPATIENT)
Dept: FAMILY MEDICINE | Facility: CLINIC | Age: 54
End: 2024-12-05
Payer: COMMERCIAL

## 2024-12-05 NOTE — TELEPHONE ENCOUNTER
I am familiar with patient. I only need to be updated if he is consistently refusing many days (weeks) in a row. Often a follow up visit to discuss is helpful for him when that occurs to remind him why I prescribe the medications.     TIERRA Dickens CNP

## 2024-12-05 NOTE — TELEPHONE ENCOUNTER
Call from [facility] with [facility] transferred to author  Tele: [phone]    Pinky states they were told by Gerito that whenever patient refused a medication the prescribing provider needs to be updated  Pinky states patient will intermittently refuse his medications and refused ASA, Sertraline, Fibercon, Crestor, and Eye Drop today 12/5/2024     Pinky states this is not out of patient's norm, but is notifying provider per Geritom suggestion  Please advise if provider would like [facility] to call with update when patient refuses medication or to update provider of medication compliance at scheduled follow-up appointments    Ga Lim, Clinic RN  [facility]

## 2024-12-05 NOTE — TELEPHONE ENCOUNTER
Called Pinky from group home with PCP's message. Pinky states the patient has refused the eye drops and fibercon for several days in a row and has been more routinely refusing other medications (several days per week, not necessarily consecutive days). Offered to schedule an appointment. Pinky declined at this time and opted to monitor until next week and will schedule an appointment if medication refusal behavior continues.    Marilou CASTELLANOS LPN

## 2024-12-12 ENCOUNTER — OFFICE VISIT (OUTPATIENT)
Dept: PHYSICAL MEDICINE AND REHAB | Facility: CLINIC | Age: 54
End: 2024-12-12
Attending: NURSE PRACTITIONER
Payer: COMMERCIAL

## 2024-12-12 ENCOUNTER — TELEPHONE (OUTPATIENT)
Dept: PHYSICAL MEDICINE AND REHAB | Facility: CLINIC | Age: 54
End: 2024-12-12

## 2024-12-12 ENCOUNTER — TELEPHONE (OUTPATIENT)
Dept: FAMILY MEDICINE | Facility: CLINIC | Age: 54
End: 2024-12-12

## 2024-12-12 VITALS — HEART RATE: 67 BPM | SYSTOLIC BLOOD PRESSURE: 145 MMHG | DIASTOLIC BLOOD PRESSURE: 72 MMHG | OXYGEN SATURATION: 98 %

## 2024-12-12 DIAGNOSIS — M54.12 CERVICAL RADICULOPATHY: Primary | ICD-10-CM

## 2024-12-12 DIAGNOSIS — M51.16 LUMBAR DISC HERNIATION WITH RADICULOPATHY: ICD-10-CM

## 2024-12-12 ASSESSMENT — PAIN SCALES - GENERAL: PAINLEVEL_OUTOF10: MODERATE PAIN (5)

## 2024-12-12 NOTE — LETTER
12/12/2024      Ihsan Marcus  [address]  [facility] [address]      Dear Colleague,    Thank you for referring your patient, Ihsan Marcus, to the [facility]. Please see a copy of my visit note below.      Assessment:     Diagnoses and all orders for this visit:  Cervical radiculopathy  -     PAIN Transforaminal BA Inj Cervical One Level Left; Future  Lumbar disc herniation with radiculopathy  -     Spine  Referral     Ihsan Marcus is a 54 year old y.o. male with past medical history significant for subacute dyskinesia, cervical spondylosis with myelopathy, lumbar pain, cervical radiculopathy, mild persistent asthma, allergic rhinitis, candidiasis of of esophagus, constipation, gastritis, rectal hemorrhage, hyperlipidemia, TIA, schizophrenia, biopolar 1 disorder, borderline intellectual functioning, delusional disorder, psychosis psychological insomnia, schizoaffective disorder bipolar type, anorexia, drug dependence in remission, nondependent cannabis abuse, urinary urgency who presents today for follow-up regarding neck and arm pain and new back and bilateral lower extremity pain:     -Patient reports 3 months of greater than 50% relief with his left C3-4 transforaminal epidural steroid injection.  Pain is now returned.  Low back and leg pain is likely secondary to lumbar radiculopathy    Plan:     A shared decision making plan was used.  The patient's values and choices were respected. Prior medical records from our last visit on 8/9/2024 were reviewed today. The following represents what was discussed and decided upon by the provider and the patient.     -DIAGNOSTIC TESTS: Images were personally reviewed and interpreted.   --MRI of cervical spine dated 5/6/2024 is personally viewed images interpreted and discussed with the patient.  It does show a C5-6 ACDF.  There is no high-grade spinal canal stenosis.  There is moderate to severe foraminal stenosis bilaterally  at C3-4 and on the right at C4-5.  -- MRI of lumbar spine dated 5/6/2024 is personally reviewed images interpreted and discussed with the patient.  There is moderate narrowing of the right lateral recess at L5-S1 and mild bilateral foraminal stenosis.  There is moderate facet arthropathy at L5-S1 and L4-5.          -INTERVENTIONS: I ordered a repeat C3-4 transforaminal epidural steroid injection.  We will reach out to his primary care provider regarding stopping aspirin for 6 days prior to injection.  Could consider bilateral L5-S1 transforaminal epidural steroid injections in the future.    -MEDICATIONS: No changes to medications.  -  Discussed side effects of medications and proper use. Patient verbalized understanding.    -PHYSICAL THERAPY: Recommend he continue with home exercises on a consistent basis.    -PATIENT EDUCATION: We discussed pain management in a multiple to fashion including physical therapy, medication management, possible future injections.    -FOLLOW UP: Patient will follow-up 2 to 4 weeks after injections.  Advised to contact clinic if symptoms worsen or change.    Subjective:     Ihsan Marcus is a 54 year old male who presents today for follow-up regarding neck pain and new low back and bilateral lower extremity pain.  Patient notes that his neck pain had improved for roughly 3 months with 50% or more relief and now pain is returned.  He also has low back pain with pain going down both of his legs and the back as well as numbness and tingling the bottom of his feet.  Pain today is 5/10 its worst is 5/10.  At its best is 4/10.  Pain is worse with movement and improved with lying down and medications.  He denies any bowel or bladder changes, fevers, chills, unintentional weight loss.    No myelopathic symptoms.     Evaluation to Date: MRI of cervical and lumbar spine dated 5/6/2024.     Treatment to Date: Left C3-4 transforaminal epidural steroid injection done on 7/9/2024.    Patient Active  Problem List   Diagnosis     Schizophrenia (H)     Positive reaction to tuberculin skin test     Anorexia     Bipolar I disorder, single manic episode (H)     Borderline intellectual functioning     Candidiasis of esophagus (H)     Constipation     Delusional disorder (H)     Drug dependence, in remission (H)     Drug induced subacute dyskinesia     Gastritis due to Helicobacter species     Gastroesophageal reflux disease without esophagitis     Hyperlipidemia     Mild persistent asthma     Nocturnal enuresis     Nondependent cannabis abuse     Pityriasis versicolor     Polyp of colon     Psychophysiological insomnia     Rectal hemorrhage     Rhinitis, allergic     Schizoaffective disorder, bipolar type (H)     Transient ischemic attack     Urinary urgency     Verruca plantaris     History of Helicobacter pylori infection     Cervical spondylosis with myelopathy     Cervical cord compression with myelopathy (H)       Current Outpatient Medications   Medication Sig Dispense Refill     acetaminophen (TYLENOL) 325 MG tablet Take 3 tablets (975 mg) by mouth every 8 hours as needed for pain. 90 tablet 1     albuterol (PROAIR HFA/PROVENTIL HFA/VENTOLIN HFA) 108 (90 Base) MCG/ACT inhaler 2 PUFFS EVERY 4 HOURS AS NEEDED FOR SHORTNESS OF BREATH 18 g 1     aspirin (ASPIRIN LOW DOSE) 81 MG chewable tablet Take 1 tablet daily in the evening 28 tablet 11     azelastine (ASTELIN) 0.1 % nasal spray Spray 1 spray into both nostrils 2 times daily As needed for nasal congestion 30 mL 5     calcium carbonate (TUMS) 500 MG chewable tablet Take 2 tablets (1,000 mg) by mouth 2 times daily as needed for heartburn 180 tablet 3     calcium polycarbophil (FIBERCON) 625 MG tablet Take 2 tablets (1,250 mg) by mouth at bedtime 60 tablet 11     carboxymethylcellulose PF (GOODSENSE LUBRICATING EYE DROP) 0.5 % ophthalmic solution Place 1 drop into both eyes 3 times daily as needed for dry eyes 30 each 4     cetirizine (ZYRTEC) 10 MG tablet Take 1  tablet (10 mg) by mouth daily Take at bedtime 90 tablet 1     dextran 70-hypromellose (TEARS NATURALE FREE PF) 0.1-0.3 % ophthalmic solution Place 1 drop into both eyes daily as needed (as needed for dry eyes) 35 each 11     diphenhydrAMINE (BENADRYL) 25 MG tablet TAKE ONE TABLET BY MOUTH EVERY SIX HOURS AS NEEDED FOR ALLERGIES OR ITCHING 30 tablet 0     EPINEPHrine (ANY BX GENERIC EQUIV) 0.3 MG/0.3ML injection 2-pack Inject the contents of one device into the muscle as needed for anaphylaxis; may repeat one time in 5-15 minutes if response to initial dose is inadequate (Patient not taking: Reported on 11/11/2024) 2 each 1     famotidine (PEPCID) 20 MG tablet Take 1 tablet (20 mg) by mouth 2 times daily as needed (for symptoms of reflux) 180 tablet 3     gabapentin (NEURONTIN) 100 MG capsule Take 100 mg  TWO TIMES DAILY as needed during waking hours 9224-2756. Take 200mg dose at bedtime. 120 capsule 11     guaiFENesin (ROBITUSSIN) 20 mg/mL liquid Take 10 mLs (200 mg) by mouth every 4 hours as needed for cough 473 mL 3     ketoconazole (NIZORAL) 2 % external shampoo Apply topically to chest and back at least 15 min prior to shower. Okay to leave on up to 8 hours prior to showering x 7 days. Repeat as needed for tinea versacolor 120 mL 1     melatonin 3 MG tablet Take 2 tablets (6 mg) by mouth nightly as needed for sleep. 60 tablet 11     Menthol-Camphor (ICY HOT ADVANCED PAIN RELIEF) 16-11 % CREA Externally apply 2 g topically 5 x daily PRN (for pain on shoulder for muscle spasm and joint pain) 56 g 0     OLANZapine (ZYPREXA) 15 MG tablet Take 15 mg by mouth At Bedtime       OLANZapine (ZYPREXA) 5 MG tablet Take 5 mg by mouth daily as needed for anxiety or agitation       ondansetron (ZOFRAN ODT) 4 MG ODT tab Place 4-8 mg under the tongue       predniSONE (DELTASONE) 20 MG tablet Take 2 tablets (40 mg) by mouth daily. 10 tablet 0     rosuvastatin (CRESTOR) 10 MG tablet Take 1 tab every day in the evening. 90 tablet 3      senna-docusate (SENOKOT-S/PERICOLACE) 8.6-50 MG tablet Take 2 tablets by mouth daily. 60 tablet 11     SM MILK OF MAGNESIA 1200 MG/15ML suspension Take 30 mLs by mouth daily as needed for constipation or heartburn. 769 mL 1     No current facility-administered medications for this visit.       Allergies   Allergen Reactions     Fish Oil Swelling and Other (See Comments)     Facial swelling and sleepiness     Latex      Other reaction(s): Other (see comments)     Mushroom Hives     Olive Oil Unknown and Other (See Comments)     Peanut (Diagnostic)      Other reaction(s): Edema     Penicillins      Other reaction(s): Other (see comments)     Trazodone      Other reaction(s): Other (see comments)     Bees Unknown     Noted in [facility]work       Past Medical History:   Diagnosis Date     Bipolar disorder (H)      Borderline intellectual functioning      Cervical spondylosis with myelopathy 05/05/2022     Diffuse traumatic brain injury (H)      Mild persistent asthma 04/27/2018     Schizophrenia (H)         Review of Systems  ROS:  Specifically negative for bowel/bladder dysfunction, balance changes, headache, dizziness, foot drop, fevers, chills, appetite changes, nausea/vomiting, unexplained weight loss. Otherwise 13 systems reviewed are negative. Please see the patient's intake questionnaire from today for details.    Reviewed Social, Family, Past Medical and Past Surgical history with patient, no significant changes noted since prior visit.     Objective:     BP (!) 145/72   Pulse 67   SpO2 98%     PHYSICAL EXAMINATION:   --CONSTITUTIONAL: Vital signs as above. No acute distress. The patient is well nourished and well groomed.  --PSYCHIATRIC:  The patient is awake, alert, oriented to person, place, time and answering questions appropriately with clear speech. Appropriate mood and affect   --HEENT: Sclera are non-injected.   --SKIN: Skin over the face is clean, dry, intact without rashes.  --RESPIRATORY:  Normal rhythm and effort. No abnormal accessory muscle breathing patterns noted.   --GROSS MOTOR: Easily arises from a seated position.   --CERVICAL SPINE: Inspection reveals no evidence of deformity. Range of motion is mildly limited in cervical flexion, extension, lateral rotation.  Tenderness palpation of the left cervical paraspinal muscles.   --UPPER EXTREMITY MOTOR TESTING:  Wrist flexion left 5/5, right 5/5  Wrist extension left 5/5, right 5/5  Pronators left 5/5, right 5/5  Biceps left 5/5, right 5/5   Triceps left 5/5, right 5/5   Shoulder abduction left 5/5, right 5/5   left 5/5, right 5/5  --NEUROLOGIC:  Sensation to upper extremities is intact. Negative Ya's bilaterally.   --VASCULAR: Warm upper limbs bilaterally.     Imaging of the cervical spine: Cervical spine imaging was reviewed today.            Again, thank you for allowing me to participate in the care of your patient.        Sincerely,        Ruperto Morel, DO

## 2024-12-12 NOTE — PATIENT INSTRUCTIONS
[facility] Injection Requirements    A  is required for all fluoroscopically-guided injections.  Injection appointments may be cancelled if there are signs/symptoms of an active infection or if the patient is being actively treated with antibiotics for a diagnosed infection.  Patients may have their steroid injection cancelled if they have had another steroid injection within 2 weeks.  Diabetic patients will have their blood glucose levels checked the day of their injection and the appointment will be rescheduled if the blood glucose level is 300 or higher.  Patients with allergies to cortisone, local anesthetics, iodine, or contrast dye should contact the Spine Center to further discuss these considerations.  Patients scheduled for medial branch block diagnostic injections should refrain from taking pain medication the day of the procedure.  The medial branch block injection appointment will be rescheduled if the patient's pain rating is not 5/10 or greater at the time of the procedure.  Patients taking warfarin/Coumadin will have their INR checked the day of the procedure and the procedure may be rescheduled if the INR is greater than 3.0.  Please contact the Spine Center [phone]) if you are taking any prescription blood-thinning medications (aspirin, warfarin, Plavix, Lovenox, Eliquis, Brilinta, Effient, etc.) as special dosing adjustments may need to be made depending on the type of injection you are scheduled to receive.  It is recommended that you delay having your steroid injection if you have received any vaccines within 2 weeks.    ~Please call Nurse Navigation line [phone] with any questions or concerns about your treatment plan, if symptoms worsen and you would like to be seen urgently, or if you have problems controlling bladder and bowel function.

## 2024-12-12 NOTE — TELEPHONE ENCOUNTER
Pt seen at the [facility] today, 12/12/24, and it was recommended he proceed with Left C3-4 transforaminal epidural steroid injection.     Per anticoagulation guidelines, pt would need to HOLD aspirin 6 days prior to procedure, and resume taking 24 hours post procedure. Please advise if OK for patient to do so. Thank you!

## 2024-12-12 NOTE — TELEPHONE ENCOUNTER
Please contact Mayda LOCKHART his primary care provider in regards to him stopping aspirin 6 days prior to injection.

## 2024-12-12 NOTE — PROGRESS NOTES
Assessment:     Diagnoses and all orders for this visit:  Cervical radiculopathy  -     PAIN Transforaminal BA Inj Cervical One Level Left; Future  Lumbar disc herniation with radiculopathy  -     Spine  Referral     Ihsan Marcus is a 54 year old y.o. male with past medical history significant for subacute dyskinesia, cervical spondylosis with myelopathy, lumbar pain, cervical radiculopathy, mild persistent asthma, allergic rhinitis, candidiasis of of esophagus, constipation, gastritis, rectal hemorrhage, hyperlipidemia, TIA, schizophrenia, biopolar 1 disorder, borderline intellectual functioning, delusional disorder, psychosis psychological insomnia, schizoaffective disorder bipolar type, anorexia, drug dependence in remission, nondependent cannabis abuse, urinary urgency who presents today for follow-up regarding neck and arm pain and new back and bilateral lower extremity pain:     -Patient reports 3 months of greater than 50% relief with his left C3-4 transforaminal epidural steroid injection.  Pain is now returned.  Low back and leg pain is likely secondary to lumbar radiculopathy    Plan:     A shared decision making plan was used.  The patient's values and choices were respected. Prior medical records from our last visit on 8/9/2024 were reviewed today. The following represents what was discussed and decided upon by the provider and the patient.     -DIAGNOSTIC TESTS: Images were personally reviewed and interpreted.   --MRI of cervical spine dated 5/6/2024 is personally viewed images interpreted and discussed with the patient.  It does show a C5-6 ACDF.  There is no high-grade spinal canal stenosis.  There is moderate to severe foraminal stenosis bilaterally at C3-4 and on the right at C4-5.  -- MRI of lumbar spine dated 5/6/2024 is personally reviewed images interpreted and discussed with the patient.  There is moderate narrowing of the right lateral recess at L5-S1 and mild bilateral foraminal  stenosis.  There is moderate facet arthropathy at L5-S1 and L4-5.          -INTERVENTIONS: I ordered a repeat C3-4 transforaminal epidural steroid injection.  We will reach out to his primary care provider regarding stopping aspirin for 6 days prior to injection.  Could consider bilateral L5-S1 transforaminal epidural steroid injections in the future.    -MEDICATIONS: No changes to medications.  -  Discussed side effects of medications and proper use. Patient verbalized understanding.    -PHYSICAL THERAPY: Recommend he continue with home exercises on a consistent basis.    -PATIENT EDUCATION: We discussed pain management in a multiple to fashion including physical therapy, medication management, possible future injections.    -FOLLOW UP: Patient will follow-up 2 to 4 weeks after injections.  Advised to contact clinic if symptoms worsen or change.    Subjective:     Ihsan Marcus is a 54 year old male who presents today for follow-up regarding neck pain and new low back and bilateral lower extremity pain.  Patient notes that his neck pain had improved for roughly 3 months with 50% or more relief and now pain is returned.  He also has low back pain with pain going down both of his legs and the back as well as numbness and tingling the bottom of his feet.  Pain today is 5/10 its worst is 5/10.  At its best is 4/10.  Pain is worse with movement and improved with lying down and medications.  He denies any bowel or bladder changes, fevers, chills, unintentional weight loss.    No myelopathic symptoms.     Evaluation to Date: MRI of cervical and lumbar spine dated 5/6/2024.     Treatment to Date: Left C3-4 transforaminal epidural steroid injection done on 7/9/2024.    Patient Active Problem List   Diagnosis    Schizophrenia (H)    Positive reaction to tuberculin skin test    Anorexia    Bipolar I disorder, single manic episode (H)    Borderline intellectual functioning    Candidiasis of esophagus (H)    Constipation     Delusional disorder (H)    Drug dependence, in remission (H)    Drug induced subacute dyskinesia    Gastritis due to Helicobacter species    Gastroesophageal reflux disease without esophagitis    Hyperlipidemia    Mild persistent asthma    Nocturnal enuresis    Nondependent cannabis abuse    Pityriasis versicolor    Polyp of colon    Psychophysiological insomnia    Rectal hemorrhage    Rhinitis, allergic    Schizoaffective disorder, bipolar type (H)    Transient ischemic attack    Urinary urgency    Verruca plantaris    History of Helicobacter pylori infection    Cervical spondylosis with myelopathy    Cervical cord compression with myelopathy (H)       Current Outpatient Medications   Medication Sig Dispense Refill    acetaminophen (TYLENOL) 325 MG tablet Take 3 tablets (975 mg) by mouth every 8 hours as needed for pain. 90 tablet 1    albuterol (PROAIR HFA/PROVENTIL HFA/VENTOLIN HFA) 108 (90 Base) MCG/ACT inhaler 2 PUFFS EVERY 4 HOURS AS NEEDED FOR SHORTNESS OF BREATH 18 g 1    aspirin (ASPIRIN LOW DOSE) 81 MG chewable tablet Take 1 tablet daily in the evening 28 tablet 11    azelastine (ASTELIN) 0.1 % nasal spray Spray 1 spray into both nostrils 2 times daily As needed for nasal congestion 30 mL 5    calcium carbonate (TUMS) 500 MG chewable tablet Take 2 tablets (1,000 mg) by mouth 2 times daily as needed for heartburn 180 tablet 3    calcium polycarbophil (FIBERCON) 625 MG tablet Take 2 tablets (1,250 mg) by mouth at bedtime 60 tablet 11    carboxymethylcellulose PF (GOODSENSE LUBRICATING EYE DROP) 0.5 % ophthalmic solution Place 1 drop into both eyes 3 times daily as needed for dry eyes 30 each 4    cetirizine (ZYRTEC) 10 MG tablet Take 1 tablet (10 mg) by mouth daily Take at bedtime 90 tablet 1    dextran 70-hypromellose (TEARS NATURALE FREE PF) 0.1-0.3 % ophthalmic solution Place 1 drop into both eyes daily as needed (as needed for dry eyes) 35 each 11    diphenhydrAMINE (BENADRYL) 25 MG tablet TAKE ONE  TABLET BY MOUTH EVERY SIX HOURS AS NEEDED FOR ALLERGIES OR ITCHING 30 tablet 0    EPINEPHrine (ANY BX GENERIC EQUIV) 0.3 MG/0.3ML injection 2-pack Inject the contents of one device into the muscle as needed for anaphylaxis; may repeat one time in 5-15 minutes if response to initial dose is inadequate (Patient not taking: Reported on 11/11/2024) 2 each 1    famotidine (PEPCID) 20 MG tablet Take 1 tablet (20 mg) by mouth 2 times daily as needed (for symptoms of reflux) 180 tablet 3    gabapentin (NEURONTIN) 100 MG capsule Take 100 mg  TWO TIMES DAILY as needed during waking hours 8546-5886. Take 200mg dose at bedtime. 120 capsule 11    guaiFENesin (ROBITUSSIN) 20 mg/mL liquid Take 10 mLs (200 mg) by mouth every 4 hours as needed for cough 473 mL 3    ketoconazole (NIZORAL) 2 % external shampoo Apply topically to chest and back at least 15 min prior to shower. Okay to leave on up to 8 hours prior to showering x 7 days. Repeat as needed for tinea versacolor 120 mL 1    melatonin 3 MG tablet Take 2 tablets (6 mg) by mouth nightly as needed for sleep. 60 tablet 11    Menthol-Camphor (ICY HOT ADVANCED PAIN RELIEF) 16-11 % CREA Externally apply 2 g topically 5 x daily PRN (for pain on shoulder for muscle spasm and joint pain) 56 g 0    OLANZapine (ZYPREXA) 15 MG tablet Take 15 mg by mouth At Bedtime      OLANZapine (ZYPREXA) 5 MG tablet Take 5 mg by mouth daily as needed for anxiety or agitation      ondansetron (ZOFRAN ODT) 4 MG ODT tab Place 4-8 mg under the tongue      predniSONE (DELTASONE) 20 MG tablet Take 2 tablets (40 mg) by mouth daily. 10 tablet 0    rosuvastatin (CRESTOR) 10 MG tablet Take 1 tab every day in the evening. 90 tablet 3    senna-docusate (SENOKOT-S/PERICOLACE) 8.6-50 MG tablet Take 2 tablets by mouth daily. 60 tablet 11    SM MILK OF MAGNESIA 1200 MG/15ML suspension Take 30 mLs by mouth daily as needed for constipation or heartburn. 769 mL 1     No current facility-administered medications for this  visit.       Allergies   Allergen Reactions    Fish Oil Swelling and Other (See Comments)     Facial swelling and sleepiness    Latex      Other reaction(s): Other (see comments)    Mushroom Hives    Olive Oil Unknown and Other (See Comments)    Peanut (Diagnostic)      Other reaction(s): Edema    Penicillins      Other reaction(s): Other (see comments)    Trazodone      Other reaction(s): Other (see comments)    Bees Unknown     Noted in County Paperwork       Past Medical History:   Diagnosis Date    Bipolar disorder (H)     Borderline intellectual functioning     Cervical spondylosis with myelopathy 05/05/2022    Diffuse traumatic brain injury (H)     Mild persistent asthma 04/27/2018    Schizophrenia (H)         Review of Systems  ROS:  Specifically negative for bowel/bladder dysfunction, balance changes, headache, dizziness, foot drop, fevers, chills, appetite changes, nausea/vomiting, unexplained weight loss. Otherwise 13 systems reviewed are negative. Please see the patient's intake questionnaire from today for details.    Reviewed Social, Family, Past Medical and Past Surgical history with patient, no significant changes noted since prior visit.     Objective:     BP (!) 145/72   Pulse 67   SpO2 98%     PHYSICAL EXAMINATION:   --CONSTITUTIONAL: Vital signs as above. No acute distress. The patient is well nourished and well groomed.  --PSYCHIATRIC:  The patient is awake, alert, oriented to person, place, time and answering questions appropriately with clear speech. Appropriate mood and affect   --HEENT: Sclera are non-injected.   --SKIN: Skin over the face is clean, dry, intact without rashes.  --RESPIRATORY: Normal rhythm and effort. No abnormal accessory muscle breathing patterns noted.   --GROSS MOTOR: Easily arises from a seated position.   --CERVICAL SPINE: Inspection reveals no evidence of deformity. Range of motion is mildly limited in cervical flexion, extension, lateral rotation.  Tenderness  palpation of the left cervical paraspinal muscles.   --UPPER EXTREMITY MOTOR TESTING:  Wrist flexion left 5/5, right 5/5  Wrist extension left 5/5, right 5/5  Pronators left 5/5, right 5/5  Biceps left 5/5, right 5/5   Triceps left 5/5, right 5/5   Shoulder abduction left 5/5, right 5/5   left 5/5, right 5/5  --NEUROLOGIC:  Sensation to upper extremities is intact. Negative Ya's bilaterally.   --VASCULAR: Warm upper limbs bilaterally.     Imaging of the cervical spine: Cervical spine imaging was reviewed today.

## 2024-12-12 NOTE — TELEPHONE ENCOUNTER
Tita Swartz, RN2 hours ago (9:12 AM)     AK  Pt seen at the [facility] today, 12/12/24, and it was recommended he proceed with Left C3-4 transforaminal epidural steroid injection.      Per anticoagulation guidelines, pt would need to HOLD aspirin 6 days prior to procedure, and resume taking 24 hours post procedure. Please advise if OK for patient to do so. Thank you!         Note     Ruperto Morel DO routed conversation to [facility] Care Navigation Pool2 hours ago (9:09 AM)     Ruperto Morel DO2 hours ago (9:09 AM)       Please contact Mayda LOCKHART his primary care provider in regards to him stopping aspirin 6 days prior to injection.

## 2024-12-19 ENCOUNTER — NURSE TRIAGE (OUTPATIENT)
Dept: NURSING | Facility: CLINIC | Age: 54
End: 2024-12-19
Payer: COMMERCIAL

## 2024-12-19 ENCOUNTER — HOSPITAL ENCOUNTER (EMERGENCY)
Facility: HOSPITAL | Age: 54
Discharge: HOME | End: 2024-12-19
Attending: EMERGENCY MEDICINE | Admitting: EMERGENCY MEDICINE
Payer: COMMERCIAL

## 2024-12-19 VITALS
TEMPERATURE: 97.9 F | HEART RATE: 58 BPM | RESPIRATION RATE: 18 BRPM | DIASTOLIC BLOOD PRESSURE: 81 MMHG | SYSTOLIC BLOOD PRESSURE: 156 MMHG | OXYGEN SATURATION: 99 %

## 2024-12-19 DIAGNOSIS — T78.40XA ALLERGIC REACTION, INITIAL ENCOUNTER: ICD-10-CM

## 2024-12-19 PROCEDURE — 250N000011 HC RX IP 250 OP 636: Performed by: EMERGENCY MEDICINE

## 2024-12-19 PROCEDURE — 99283 EMERGENCY DEPT VISIT LOW MDM: CPT

## 2024-12-19 PROCEDURE — 250N000012 HC RX MED GY IP 250 OP 636 PS 637: Performed by: EMERGENCY MEDICINE

## 2024-12-19 RX ORDER — PREDNISONE 20 MG/1
40 TABLET ORAL DAILY
Qty: 8 TABLET | Refills: 0 | Status: SHIPPED | OUTPATIENT
Start: 2024-12-19 | End: 2024-12-19

## 2024-12-19 RX ORDER — PREDNISONE 20 MG/1
40 TABLET ORAL DAILY
Qty: 8 TABLET | Refills: 0 | Status: SHIPPED | OUTPATIENT
Start: 2024-12-19 | End: 2024-12-23

## 2024-12-19 RX ORDER — ONDANSETRON 4 MG/1
4 TABLET, ORALLY DISINTEGRATING ORAL ONCE
Status: COMPLETED | OUTPATIENT
Start: 2024-12-19 | End: 2024-12-19

## 2024-12-19 RX ORDER — PREDNISONE 20 MG/1
40 TABLET ORAL ONCE
Status: COMPLETED | OUTPATIENT
Start: 2024-12-19 | End: 2024-12-19

## 2024-12-19 RX ADMIN — PREDNISONE 40 MG: 20 TABLET ORAL at 19:50

## 2024-12-19 RX ADMIN — ONDANSETRON 4 MG: 4 TABLET, ORALLY DISINTEGRATING ORAL at 19:50

## 2024-12-19 ASSESSMENT — ACTIVITIES OF DAILY LIVING (ADL)
ADLS_ACUITY_SCORE: 48
ADLS_ACUITY_SCORE: 48

## 2024-12-20 NOTE — DISCHARGE INSTRUCTIONS
You appear to have suffered an allergic reaction today.  Take the prescribed steroid medication over the next several days as directed and follow-up closely with your primary care doctor.  Return to the emergency department for any worsening symptoms or other concerns.

## 2024-12-20 NOTE — ED TRIAGE NOTES
Patient arrives by EMS for an allergic reaction. Patient lives in a group home states the group home put fish oil in the food tonight and he had swelling and trouble breathing. Patient states he is feeling better now but not 100%.

## 2024-12-20 NOTE — ED NOTES
Bed: JNEDA  Expected date:   Expected time:   Means of arrival:   Comments:  Alayna - TristanM Allergic Rxn

## 2024-12-20 NOTE — ED PROVIDER NOTES
EMERGENCY DEPARTMENT ENCOUnter      NAME: Ihsan Marcus  AGE: 54 year old male  YOB: 1970  MRN: [id number]  EVALUATION DATE & TIME: 2024  7:20 PM    PCP: Mayda Albarran    ED PROVIDER: Huang Marcus DO      Chief Complaint   Patient presents with    Allergic Reaction         FINAL IMPRESSION:  1. Allergic reaction, initial encounter          ED COURSE & MEDICAL DECISION MAKIN:26 PM I met with the patient in hallway bed A for the initial interview and physical examination. Discussed plan for treatment and workup in the ED.    8:19 PM I discussed the plan for discharge with the patient, and patient is agreeable. We discussed supportive cares at home and reasons for return to the ER including new or worsening symptoms - all questions and concerns addressed. Patient to be discharged by RN.       The patient presented to the emergency department today complaining of some lip swelling and shortness of breath which began after eating fish which he is allergic to.  He was given Benadryl by EMS and route to the emergency department and had near complete resolution of his symptoms by the time he arrived here.  He was given additional steroids and monitored.  He did not develop any further symptoms.  At this time I feel that he can be safely discharged home with additional steroids and instructions for close follow-up.  He has been advised to return for any worsening symptoms or other concerns.      Medical Decision Making  Obtained supplemental history:Supplemental history obtained?: Documented in chart  Reviewed external records: External records reviewed?: No  Care impacted by chronic illness:Other: none  Did you consider but not order tests?: Work up considered but not performed and documented in chart, if applicable  Did you interpret images independently?: Independent interpretation of ECG and images noted in documentation, when applicable.  Consultation discussion with other provider:Did  you involve another provider (consultant, , pharmacy, etc.)?: No  Discharge. I prescribed additional prescription strength medication(s) as charted. I considered admission, but discharged patient after significant clinical improvement.    MIPS: Not Applicable      At the conclusion of the encounter I discussed the results of all of the tests and the disposition. The questions were answered. The patient or family acknowledged understanding and was agreeable with the care plan.         MEDICATIONS GIVEN IN THE EMERGENCY:  Medications   predniSONE (DELTASONE) tablet 40 mg (40 mg Oral $Given 12/19/24 1950)   ondansetron (ZOFRAN ODT) ODT tab 4 mg (4 mg Oral $Given 12/19/24 1950)       =================================================================    HPI        Ihsan Marcus is a 54 year old male with a pertinent history of diffuse TBI, schizophrenia, and bipolar 1 disorder who presents to this ED by ambulance for evaluation of an allergic reaction.    Patient reports that he ate fish, which he has a known allergy to, around 1750 this evening. He then developed rapid onset of sore throat, chest pain, shortness of breath, dizziness, and lip and tongue swelling. The ambulance gave him a medication en route. Patient feels better and as though his breathing is somewhat back to normal. His facial swelling has resolved, but patient still feels some tension to the areas that were swollen. Patient felt OK earlier today.    Patient notes that he is supposed to have a wart on the tip of his toe removed in the middle of next month, but he does not want to wait due to pain and difficulty walking.    Patient denies any history of other medical problems.    Per patient's RN: the ambulance administered benadryl 25 mg en route.          PAST MEDICAL HISTORY:  Past Medical History:   Diagnosis Date    Bipolar disorder (H)     Borderline intellectual functioning     Cervical spondylosis with myelopathy 05/05/2022    Diffuse traumatic  brain injury (H)     Mild persistent asthma 04/27/2018    Schizophrenia (H)        PAST SURGICAL HISTORY:  Past Surgical History:   Procedure Laterality Date    COLONOSCOPY N/A 8/13/2021    Procedure: COLONOSCOPY;  Surgeon: Stewart Monsalve MD;  Location: [facility]    FUSION CERVICAL ANTERIOR ONE LEVEL N/A 5/6/2022    Procedure: Cervical 5 - Cervical 6 anterior cervical decompression and fusion with plate;  Surgeon: Laurie Gage MD;  Location: [facility] Main OR           CURRENT MEDICATIONS:    predniSONE (DELTASONE) 20 MG tablet  acetaminophen (TYLENOL) 325 MG tablet  albuterol (PROAIR HFA/PROVENTIL HFA/VENTOLIN HFA) 108 (90 Base) MCG/ACT inhaler  aspirin (ASPIRIN LOW DOSE) 81 MG chewable tablet  azelastine (ASTELIN) 0.1 % nasal spray  calcium carbonate (TUMS) 500 MG chewable tablet  calcium polycarbophil (FIBERCON) 625 MG tablet  carboxymethylcellulose PF (GOODSENSE LUBRICATING EYE DROP) 0.5 % ophthalmic solution  cetirizine (ZYRTEC) 10 MG tablet  dextran 70-hypromellose (TEARS NATURALE FREE PF) 0.1-0.3 % ophthalmic solution  diphenhydrAMINE (BENADRYL) 25 MG tablet  EPINEPHrine (ANY BX GENERIC EQUIV) 0.3 MG/0.3ML injection 2-pack  famotidine (PEPCID) 20 MG tablet  gabapentin (NEURONTIN) 100 MG capsule  guaiFENesin (ROBITUSSIN) 20 mg/mL liquid  ketoconazole (NIZORAL) 2 % external shampoo  melatonin 3 MG tablet  Menthol-Camphor (ICY HOT ADVANCED PAIN RELIEF) 16-11 % CREA  OLANZapine (ZYPREXA) 15 MG tablet  OLANZapine (ZYPREXA) 5 MG tablet  ondansetron (ZOFRAN ODT) 4 MG ODT tab  predniSONE (DELTASONE) 20 MG tablet  rosuvastatin (CRESTOR) 10 MG tablet  senna-docusate (SENOKOT-S/PERICOLACE) 8.6-50 MG tablet  SM MILK OF MAGNESIA 1200 MG/15ML suspension        ALLERGIES:  Allergies   Allergen Reactions    Fish Oil Swelling and Other (See Comments)     Facial swelling and sleepiness    Latex      Other reaction(s): Other (see comments)    Mushroom Hives    Olive Oil Unknown and Other (See Comments)    Peanut  (Diagnostic)      Other reaction(s): Edema    Penicillins      Other reaction(s): Other (see comments)    Trazodone      Other reaction(s): Other (see comments)    Bees Unknown     Noted in County Paperwork       FAMILY HISTORY:  Family History   Problem Relation Age of Onset    Diabetes Mother     Coronary Artery Disease Mother     Coronary Artery Disease Father     Cancer No family hx of        SOCIAL HISTORY:   Social History     Socioeconomic History    Marital status: Single     Spouse name: None    Number of children: None    Years of education: None    Highest education level: None   Tobacco Use    Smoking status: Every Day     Current packs/day: 1.50     Average packs/day: 1.5 packs/day for 23.0 years (34.5 ttl pk-yrs)     Types: Cigarettes    Smokeless tobacco: Never    Tobacco comments:     Slowing down a lot   Vaping Use    Vaping status: Some Days    Substances: Nicotine, CBD   Substance and Sexual Activity    Alcohol use: No    Drug use: No    Sexual activity: Yes     Social Drivers of Health     Financial Resource Strain: Unknown (8/12/2024)    Financial Resource Strain     Within the past 12 months, have you or your family members you live with been unable to get utilities (heat, electricity) when it was really needed?: Patient declined   Food Insecurity: Low Risk  (8/12/2024)    Food Insecurity     Within the past 12 months, did you worry that your food would run out before you got money to buy more?: No     Within the past 12 months, did the food you bought just not last and you didn t have money to get more?: No   Transportation Needs: Low Risk  (8/12/2024)    Transportation Needs     Within the past 12 months, has lack of transportation kept you from medical appointments, getting your medicines, non-medical meetings or appointments, work, or from getting things that you need?: No   Physical Activity: Unknown (8/12/2024)    Exercise Vital Sign     Days of Exercise per Week: 2 days   Stress: No  Stress Concern Present (8/12/2024)    [employer] [place] [employer] - Occupational Stress Questionnaire     Feeling of Stress : Only a little   Social Connections: Unknown (8/12/2024)    Social Connection and Isolation Panel [NHANES]     Frequency of Social Gatherings with Friends and Family: Never   Interpersonal Safety: Low Risk  (8/12/2024)    Interpersonal Safety     Do you feel physically and emotionally safe where you currently live?: Yes     Within the past 12 months, have you been hit, slapped, kicked or otherwise physically hurt by someone?: No     Within the past 12 months, have you been humiliated or emotionally abused in other ways by your partner or ex-partner?: No   Housing Stability: Low Risk  (8/12/2024)    Housing Stability     Do you have housing? : Yes     Are you worried about losing your housing?: No       VITALS:  Patient Vitals for the past 24 hrs:   BP Temp Temp src Pulse Resp SpO2   12/19/24 2104 (!) 156/81 -- -- 58 -- 99 %   12/19/24 2049 (!) 151/80 -- -- 59 -- 100 %   12/19/24 2034 (!) 156/73 -- -- 59 -- 99 %   12/19/24 2019 (!) 174/90 -- -- 59 -- 99 %   12/19/24 2004 (!) 155/91 -- -- 60 -- 99 %   12/19/24 1949 (!) 156/90 -- -- 60 -- 98 %   12/19/24 1934 (!) 153/91 -- -- 61 -- 98 %   12/19/24 1930 (!) 141/88 97.9  F (36.6  C) Oral 61 18 98 %       PHYSICAL EXAM    Constitutional:  Well developed, Well nourished, normal voice.  HENT:  Normocephalic, Atraumatic, Oropharynx moist, Nose normal. No obvious lip swelling.  Eyes:  EOMI, Conjunctiva normal, No discharge.   Respiratory:  Normal breath sounds, breathing comfortably. No wheezing, No chest tenderness.   Cardiovascular:  Normal heart rate, Normal rhythm, No murmurs  Musculoskeletal:  No tenderness to palpation or major deformities noted.   Extremities: No lower extremity edema.  Neurologic:  Alert & oriented x 3, No focal deficits noted.   Psychiatric:  Affect normal, Judgment normal, Mood normal.          Kalyani LEÓNth,  am serving as a scribe to document services personally performed by Dr. Marcus based on my observation and the provider's statements to me. I, Huang Marcus, DO attest that Kalyani Austin is acting in a scribe capacity, has observed my performance of the services and has documented them in accordance with my direction.    Huang Marcus DO  Emergency Medicine  [facility] EMERGENCY DEPARTMENT  [address] [place] [id number] [phone]  Dept: [phone]      Huang Marcus DO  12/19/24 2233

## 2024-12-20 NOTE — TELEPHONE ENCOUNTER
Nurse Triage SBAR    Is this a 2nd Level Triage? NO    Situation: Pt possibly ingested fish oil this evening at 5:45pm accidentally     Background: Pt has an allergy to fish oil.     Assessment: Patient and Mary Andrade, resident staff member are calling. Pt gave permission to speak to Mary today about his health care.     Mary reports that she flipped a burger that the patient ate at 5:45pm with the same spatula she used to cook salmon. Pt reports that he feels like his tongue is swelling and his throat is sore. He is able to speak in full sentences.     Mary reports that patient has an Epi pen and also Benadryl.      Protocol Recommended Disposition:   Call  Now, See More Appropriate Guideline    Recommendation: 911 to be called after epi pen administered is recommended per care advice. Mary states she will follow those instructions and call 911 for patient as well.        Reason for Disposition   Tongue swelling is main symptom   Started suddenly after taking a medicine or allergic food (e.g., nuts)    Additional Information   Negative: Life-threatening allergic reaction (anaphylaxis) suspected   Negative: Lip swelling is main symptom   Negative: Face swelling   Negative: Started suddenly after sting from bee, wasp, or yellow jacket    Protocols used: Allergic Reactions - Guideline [id number], Tongue Swelling-A-  Amrita Louis RN   Triage Nurse Advisor on 12/19/2024 at 6:47 PM

## 2025-01-07 ENCOUNTER — TELEPHONE (OUTPATIENT)
Dept: PHYSICAL MEDICINE AND REHAB | Facility: CLINIC | Age: 55
End: 2025-01-07
Payer: COMMERCIAL

## 2025-01-07 NOTE — TELEPHONE ENCOUNTER
Spoke to Mary at group home regarding ASA hold. Mary stated they need a signed provider note [place] ASA hold. Forwarded request to PCP and nurse pool with request and fax number of [phone].

## 2025-01-23 ENCOUNTER — TELEPHONE (OUTPATIENT)
Dept: PHYSICAL MEDICINE AND REHAB | Facility: CLINIC | Age: 55
End: 2025-01-23

## 2025-01-23 NOTE — TELEPHONE ENCOUNTER
Called patient and was able to communicate with his care team regarding aspirin hold prior to spine injection. Injection was rescheduled from 1/23/2025 to 1/29/2025. Staff informed that the patient should continue to hold his aspirin through 1/29/2025(per Dr. Morel) and that he will resume taking it 24 hours after his injection. No further questions at this time. Phone number for care navigation nurse line provided should they have any further questions or concerns.

## 2025-02-06 ENCOUNTER — RADIOLOGY INJECTION OFFICE VISIT (OUTPATIENT)
Dept: PHYSICAL MEDICINE AND REHAB | Facility: CLINIC | Age: 55
End: 2025-02-06
Attending: PAIN MEDICINE
Payer: COMMERCIAL

## 2025-02-06 VITALS
TEMPERATURE: 97.7 F | RESPIRATION RATE: 16 BRPM | SYSTOLIC BLOOD PRESSURE: 142 MMHG | DIASTOLIC BLOOD PRESSURE: 78 MMHG | HEART RATE: 60 BPM | OXYGEN SATURATION: 99 %

## 2025-02-06 DIAGNOSIS — M54.12 CERVICAL RADICULOPATHY: ICD-10-CM

## 2025-02-06 RX ORDER — DEXAMETHASONE SODIUM PHOSPHATE 10 MG/ML
INJECTION, SOLUTION INTRAMUSCULAR; INTRAVENOUS
Status: COMPLETED | OUTPATIENT
Start: 2025-02-06 | End: 2025-02-06

## 2025-02-06 RX ORDER — LIDOCAINE HYDROCHLORIDE 10 MG/ML
INJECTION, SOLUTION EPIDURAL; INFILTRATION; INTRACAUDAL; PERINEURAL
Status: COMPLETED | OUTPATIENT
Start: 2025-02-06 | End: 2025-02-06

## 2025-02-06 RX ADMIN — DEXAMETHASONE SODIUM PHOSPHATE 10 MG: 10 INJECTION, SOLUTION INTRAMUSCULAR; INTRAVENOUS at 13:05

## 2025-02-06 RX ADMIN — LIDOCAINE HYDROCHLORIDE 2 ML: 10 INJECTION, SOLUTION EPIDURAL; INFILTRATION; INTRACAUDAL; PERINEURAL at 13:05

## 2025-02-06 ASSESSMENT — PAIN SCALES - GENERAL
PAINLEVEL_OUTOF10: SEVERE PAIN (7)
PAINLEVEL_OUTOF10: NO PAIN (0)

## 2025-02-06 NOTE — PATIENT INSTRUCTIONS
*Please resume taking your aspirin (as prescribed) starting on Friday, 2/7/25, now that your injection has been completed.*    Follow-up visit with Dr. Morel in 2-4 weeks to discuss injection outcome and determine care plan going forward.       DISCHARGE INSTRUCTIONS    During office hours (8:00 a.m.- 4:00 p.m.) questions or concerns may be answered  by calling [facility] Navigation Nurses at  [phone].  Messages received after hours will be returned the following business day.      In the case of an emergency, please dial 911 or seek assistance at the nearest Emergency Room/Urgent Care facility.     All Patients:    You may experience an increase in your symptoms for the first 2 days (It may take anywhere between 2 days- 2 weeks for the steroid to have maximum effect).    You may use ice on the injection site, as frequently as 20 minutes each hour if needed.    You may take your pain medicine.    You may continue taking your regular medication after your injection. If you have had a Medial Branch Block you may resume pain medication once your pain diary is completed.    You may shower. No swimming, tub bath or hot tub for 48 hours.  You may remove your bandaid/bandage as soon as you are home.    You may resume light activities, as tolerated.    Resume your usual diet as tolerated.    If you were told to hold any blood thinning medications you may resume taking them 24 hours after your procedure as prescribed.    It is strongly advised that you do not drive for 1-3 hours post injection.    If you have had oral sedation:  Do not drive for 8 hours post injection.      If you have had IV sedation:  Do not drive for 24 hours post injection.  Do not operate hazardous machinery or make important personal/business decisions for 24 hours.      POSSIBLE STEROID SIDE EFFECTS (If steroid/cortisone was used for your procedure)    -If you experience these symptoms, it should only last for a short period    Swelling of  the legs              Skin redness (flushing)     Mouth (oral) irritation   Blood sugar (glucose) levels            Sweats                    Mood changes  Headache  Sleeplessness  Weakened immune system for up to 14 days, which could increase the risk of prisca the COVID-19 virus and/or experiencing more severe symptoms of the disease, if exposed.  Decreased effectiveness of the flu vaccine if given within 2 weeks of the steroid.         POSSIBLE PROCEDURE SIDE EFFECTS  -Call the Spine Center if you are concerned  Increased Pain           Increased numbness/tingling      Nausea/Vomiting          Bruising/bleeding at site      Redness or swelling                                              Difficulty walking      Weakness           Fever greater than 100.5    *In the event of a severe headache after an epidural steroid injection that is relieved by lying down, please call the [facility] to speak with a clinical staff member*

## 2025-03-17 ENCOUNTER — HOSPITAL ENCOUNTER (OUTPATIENT)
Dept: GENERAL RADIOLOGY | Facility: HOSPITAL | Age: 55
Discharge: HOME | End: 2025-03-17
Attending: PHYSICIAN ASSISTANT | Admitting: PHYSICIAN ASSISTANT
Payer: COMMERCIAL

## 2025-03-17 ENCOUNTER — TELEPHONE (OUTPATIENT)
Dept: FAMILY MEDICINE | Facility: CLINIC | Age: 55
End: 2025-03-17

## 2025-03-17 ENCOUNTER — OFFICE VISIT (OUTPATIENT)
Dept: PEDIATRICS | Facility: CLINIC | Age: 55
End: 2025-03-17
Payer: COMMERCIAL

## 2025-03-17 VITALS
SYSTOLIC BLOOD PRESSURE: 111 MMHG | TEMPERATURE: 97.8 F | OXYGEN SATURATION: 100 % | DIASTOLIC BLOOD PRESSURE: 69 MMHG | HEART RATE: 67 BPM | RESPIRATION RATE: 18 BRPM

## 2025-03-17 DIAGNOSIS — R55 SYNCOPE, UNSPECIFIED SYNCOPE TYPE: Primary | ICD-10-CM

## 2025-03-17 DIAGNOSIS — R94.31 ABNORMAL ELECTROCARDIOGRAM: ICD-10-CM

## 2025-03-17 DIAGNOSIS — M54.2 NECK PAIN: ICD-10-CM

## 2025-03-17 DIAGNOSIS — R55 SYNCOPE, UNSPECIFIED SYNCOPE TYPE: ICD-10-CM

## 2025-03-17 LAB
ALBUMIN SERPL-MCNC: 4.1 G/DL (ref 3.4–5)
ALP SERPL-CCNC: 74 U/L (ref 40–150)
ALT SERPL W P-5'-P-CCNC: 15 U/L (ref 0–70)
ANION GAP SERPL CALCULATED.3IONS-SCNC: 11 MMOL/L (ref 3–14)
AST SERPL W P-5'-P-CCNC: 22 U/L (ref 0–45)
ATRIAL RATE - MUSE: 59 BPM
BASOPHILS # BLD AUTO: 0 10E3/UL (ref 0–0.2)
BASOPHILS NFR BLD AUTO: 0 %
BILIRUB SERPL-MCNC: 0.7 MG/DL (ref 0.2–1.3)
BUN SERPL-MCNC: 11 MG/DL (ref 7–30)
CALCIUM SERPL-MCNC: 9.5 MG/DL (ref 8.5–10.1)
CHLORIDE BLD-SCNC: 106 MMOL/L (ref 94–109)
CO2 SERPL-SCNC: 28 MMOL/L (ref 20–32)
CREAT SERPL-MCNC: 1 MG/DL (ref 0.66–1.25)
DIASTOLIC BLOOD PRESSURE - MUSE: NORMAL MMHG
EGFRCR SERPLBLD CKD-EPI 2021: 89 ML/MIN/1.73M2
EOSINOPHIL # BLD AUTO: 0.1 10E3/UL (ref 0–0.7)
EOSINOPHIL NFR BLD AUTO: 1 %
ERYTHROCYTE [DISTWIDTH] IN BLOOD BY AUTOMATED COUNT: 13.8 % (ref 10–15)
GLUCOSE BLD-MCNC: 103 MG/DL (ref 70–99)
HCT VFR BLD AUTO: 45.9 % (ref 40–53)
HGB BLD-MCNC: 15.2 G/DL (ref 13.3–17.7)
IMM GRANULOCYTES # BLD: 0 10E3/UL
IMM GRANULOCYTES NFR BLD: 0 %
INR PPP: 1.06 (ref 0.85–1.15)
INTERPRETATION ECG - MUSE: NORMAL
LYMPHOCYTES # BLD AUTO: 2.2 10E3/UL (ref 0.8–5.3)
LYMPHOCYTES NFR BLD AUTO: 32 %
MCH RBC QN AUTO: 30.5 PG (ref 26.5–33)
MCHC RBC AUTO-ENTMCNC: 33.1 G/DL (ref 31.5–36.5)
MCV RBC AUTO: 92 FL (ref 78–100)
MONOCYTES # BLD AUTO: 0.5 10E3/UL (ref 0–1.3)
MONOCYTES NFR BLD AUTO: 7 %
NEUTROPHILS # BLD AUTO: 4.1 10E3/UL (ref 1.6–8.3)
NEUTROPHILS NFR BLD AUTO: 60 %
P AXIS - MUSE: 43 DEGREES
PLATELET # BLD AUTO: 301 10E3/UL (ref 150–450)
POTASSIUM BLD-SCNC: 3.8 MMOL/L (ref 3.4–5.3)
PR INTERVAL - MUSE: 162 MS
PROT SERPL-MCNC: 7.5 G/DL (ref 6.8–8.8)
QRS DURATION - MUSE: 86 MS
QT - MUSE: 408 MS
QTC - MUSE: 403 MS
R AXIS - MUSE: 69 DEGREES
RBC # BLD AUTO: 4.98 10E6/UL (ref 4.4–5.9)
SODIUM SERPL-SCNC: 145 MMOL/L (ref 135–145)
SYSTOLIC BLOOD PRESSURE - MUSE: NORMAL MMHG
T AXIS - MUSE: 52 DEGREES
TROPONIN T SERPL HS-MCNC: 7 NG/L
VENTRICULAR RATE- MUSE: 59 BPM
WBC # BLD AUTO: 6.9 10E3/UL (ref 4–11)

## 2025-03-17 PROCEDURE — 84484 ASSAY OF TROPONIN QUANT: CPT | Performed by: PHYSICIAN ASSISTANT

## 2025-03-17 PROCEDURE — 72040 X-RAY EXAM NECK SPINE 2-3 VW: CPT

## 2025-03-17 PROCEDURE — 93005 ELECTROCARDIOGRAM TRACING: CPT | Performed by: PHYSICIAN ASSISTANT

## 2025-03-17 PROCEDURE — 85025 COMPLETE CBC W/AUTO DIFF WBC: CPT | Performed by: PHYSICIAN ASSISTANT

## 2025-03-17 PROCEDURE — 1125F AMNT PAIN NOTED PAIN PRSNT: CPT | Performed by: PHYSICIAN ASSISTANT

## 2025-03-17 PROCEDURE — 80053 COMPREHEN METABOLIC PANEL: CPT | Performed by: PHYSICIAN ASSISTANT

## 2025-03-17 PROCEDURE — 85610 PROTHROMBIN TIME: CPT | Performed by: PHYSICIAN ASSISTANT

## 2025-03-17 PROCEDURE — 3074F SYST BP LT 130 MM HG: CPT | Performed by: PHYSICIAN ASSISTANT

## 2025-03-17 PROCEDURE — 99214 OFFICE O/P EST MOD 30 MIN: CPT | Performed by: PHYSICIAN ASSISTANT

## 2025-03-17 PROCEDURE — 93010 ELECTROCARDIOGRAM REPORT: CPT | Performed by: INTERNAL MEDICINE

## 2025-03-17 PROCEDURE — 71046 X-RAY EXAM CHEST 2 VIEWS: CPT

## 2025-03-17 PROCEDURE — 3078F DIAST BP <80 MM HG: CPT | Performed by: PHYSICIAN ASSISTANT

## 2025-03-17 PROCEDURE — 36415 COLL VENOUS BLD VENIPUNCTURE: CPT | Performed by: PHYSICIAN ASSISTANT

## 2025-03-17 ASSESSMENT — PAIN SCALES - GENERAL: PAINLEVEL_OUTOF10: SEVERE PAIN (7)

## 2025-03-17 NOTE — PROGRESS NOTES
Acute and Diagnostic Services Clinic Visit    Assessment & Plan     (R55) Syncope, unspecified syncope type  (primary encounter diagnosis)  Comment: Plan: CBC with platelets differential, Comprehensive         metabolic panel, EKG 12-lead, tracing only, XR         Chest 2 Views, Troponin T, High Sensitivity,         INR, ZIO PATCH MAIL OUT, Adult Cardiology al         [facility] Referral            (R94.31) Abnormal electrocardiogram  Comment:   Plan:     (M54.2) Neck pain  Comment:   Plan: XR Cervical Spine 2/3 Views, CANCELED: XR         Cervical Spine 2/3 Views          The patient is a 55-year-old male with a past medical history of drug induced dyskinesia, cervical spondylolysis with myelopathy, cervical cord compression with myelopathy, mild persistent asthma, GERD, colonic polyp, rectal hemorrhage, TIA, schizophrenia, bipolar 1 disorder, delusional disorder, schizoaffective disorder bipolar type, anorexia, drug dependence in remission, cannabis abuse, H. Pylori,who lives at a group home facility, presents with caregiver for evaluation of falls and syncope which occurred over the past 24 hours, last night and early this morning.     EKG showed ST elevation with a J stroke especially in V3, some ST elevation also in V2.  Differential diagnosis includes STEMI, NSTEMI, pericarditis, Brugada syndrome.  He has had similar ST elevations especially in V3 on previous EKG completed on May 5, 2022.  The saddle shaped appearance of the ST wave may suggest Brugada syndrome.  I have concerns about this given the patient's reports of syncope.  However, he has no previous diagnosis of Brugada syndrome, has not been diagnosed with any arrhythmias or tachyarrhythmias in the past.  Feel further evaluation and management is warranted today, prescribed a Zio patch at discharge.  Follow-up with cardiology, referral ordered.    Opponent within normal limits, CMP grossly normal.  CBC within normal limits.  X-rays of the cervical  spine showed anterior fusion hardware at C5-6.  No evidence of acute fracture or dislocation.  Chest x-ray showed no acute cardiopulmonary concerns such as pneumonia, pleural effusion, pneumothorax, cardiomegaly.    Recommend seeking urgent medical evaluation if you develop worsening discomfort in the chest or abdomen, shortness of breath, dizziness or lightheadedness, syncopal episodes.    No follow-ups on file.            Funmilayo LACEY is a 55 year old, presenting for the following health issues:  Syncope    HPI    The patient is a 55-year-old male with a past medical history of drug induced dyskinesia, cervical spondylolysis with myelopathy, cervical cord compression with myelopathy, mild persistent asthma, GERD, colonic polyp, rectal hemorrhage, TIA, schizophrenia, bipolar 1 disorder, delusional disorder, schizoaffective disorder bipolar type, anorexia, drug dependence in remission, cannabis abuse, H. Pylori,who lives at a group home facility, presents with caregiver for evaluation of falls and syncope which occurred over the past 24 hours, last night and early this morning.  He describes going to eat at the dinner table, felt dizzy and had nausea, stood up and fell.  Also fell while going down the stairs once, fell 1/3 time while going to his room..  States that he thinks exposure to essential oils caused him to faint.  Describes fainting on 3 separate occasions, hit his head on the right temporal area but has limited pain here.  Denies any acute visual changes.  Has pain over the right cervical paraspinal muscles currently.  He denies any other chemical exposure.  Describes touching rat poison several years ago, which caused the same symptoms.  He recalls having another episode of syncope several years ago, underwent evaluation in the emergency department with no concerns identified.  He is not on blood thinners currently.  No acute vision changes currently, no chest pain or shortness of breath, no  lightheadedness or dizziness/vertigo or headaches.  No history of seizures.    Concern - Fall involving Syncope  Onset: Last night, early this morning  Description: Went to eat at the dinner table, felt dizzy and nausea, stood up and fell. Going down the stairs fell, and going to the patient's room fell again. All the 3 times patient blacked out/fainted.  Intensity: mild, 7/10  Progression of Symptoms:  worsening and intermittent  Accompanying Signs & Symptoms: dizziness and nausea  Previous history of similar problem: Yes, patient touched rat poisoned before and then touched his face. Patient is associating that experience with this one.  Precipitating factors:        Worsened by: N/A  Alleviating factors:        Improved by: juice or pop  Therapies tried and outcome: None        Review of Systems  Constitutional, neuro, ENT, endocrine, pulmonary, cardiac, gastrointestinal, genitourinary, musculoskeletal, integument and psychiatric systems are negative, except as otherwise noted.      Objective    /69   Pulse 67   Temp 97.8  F (36.6  C) (Oral)   Resp 18   SpO2 100%   There is no height or weight on file to calculate BMI.  Physical Exam  Constitutional:       General: He is not in acute distress.     Appearance: Normal appearance. He is not ill-appearing, toxic-appearing or diaphoretic.   HENT:      Head: Normocephalic and atraumatic.      Right Ear: Tympanic membrane, ear canal and external ear normal. There is no impacted cerumen.      Left Ear: Tympanic membrane, ear canal and external ear normal. There is no impacted cerumen.      Nose: Nose normal. No congestion or rhinorrhea.      Mouth/Throat:      Mouth: Mucous membranes are moist.      Pharynx: Oropharynx is clear. No oropharyngeal exudate or posterior oropharyngeal erythema.   Eyes:      General: No visual field deficit or scleral icterus.     Extraocular Movements: Extraocular movements intact.      Right eye: Normal extraocular motion and no  nystagmus.      Left eye: Normal extraocular motion and no nystagmus.      Pupils: Pupils are equal, round, and reactive to light. Pupils are equal.      Right eye: Pupil is round, reactive and not sluggish.      Left eye: Pupil is round, reactive and not sluggish.      Funduscopic exam:     Right eye: Red reflex present.         Left eye: Red reflex present.  Neck:        Comments: Mild tenderness to palpation over the cervical paraspinal muscles, right side at the location shown in the diagram above.  Cardiovascular:      Rate and Rhythm: Normal rate and regular rhythm.      Pulses: Normal pulses.      Heart sounds: Normal heart sounds. No murmur heard.  Pulmonary:      Effort: Pulmonary effort is normal. No respiratory distress.      Breath sounds: Normal breath sounds. No stridor. No wheezing, rhonchi or rales.   Chest:      Chest wall: No tenderness.   Musculoskeletal:         General: Normal range of motion.      Cervical back: Normal range of motion and neck supple. Tenderness present. No rigidity. No pain with movement, spinous process tenderness or muscular tenderness. Normal range of motion.      Thoracic back: Normal.      Lumbar back: Normal.   Lymphadenopathy:      Cervical: No cervical adenopathy.   Skin:     General: Skin is warm and dry.   Neurological:      General: No focal deficit present.      Mental Status: He is alert and oriented to person, place, and time.      GCS: GCS eye subscore is 4. GCS verbal subscore is 5. GCS motor subscore is 6.      Cranial Nerves: Cranial nerves 2-12 are intact. No cranial nerve deficit, dysarthria or facial asymmetry.      Sensory: Sensation is intact. No sensory deficit.      Motor: No weakness, tremor, atrophy, abnormal muscle tone or seizure activity.      Coordination: Coordination is intact. Coordination normal. Finger-Nose-Finger Test normal.      Gait: Gait is intact. Gait normal.      Deep Tendon Reflexes:      Reflex Scores:       Bicep reflexes are 2+  on the right side and 2+ on the left side.       Patellar reflexes are 2+ on the right side and 2+ on the left side.           EKG - Reviewed and interpreted by me Normal Sinus Rhythm, ST elevation in V2 and V3 which is nonspecific (has a saddle appearance in V3 similar to what was noted on EKG completed in May 2022), no LVH by voltage criteria, nonspecific ST-T changes.  EKG today is similar to what was completed in May 2022 which also showed ST elevations in V2 and V3.  However ST elevation in V3 is 2mm in V3.        Signed Electronically by: Felton Dominguez PA-C

## 2025-03-17 NOTE — PATIENT INSTRUCTIONS
You were seen and evaluated today due to a history of recent falls, EKG shows a rhythm pattern that is questionable for myocardial infarction versus Brugada syndrome versus pericarditis.    Prescribed Zio patch, follow-up with cardiology.    Seek urgent medical evaluation if you develop worsening headache, vision changes, dizziness or lightheadedness, chest pain or shortness of breath

## 2025-03-17 NOTE — TELEPHONE ENCOUNTER
Mary Foster -  calling    Ihsan had a couple falls. Recent fall was late last night, early this morning.    No injuries noted. Pt responding to Mary but wont get up.     No appointments with PCP until 4/7. Mary is taking Ihsan to . Mary would like message sent to pcp as ananya.    Kelley Joy RN on 3/17/2025 at 10:07 AM

## 2025-03-18 ENCOUNTER — ORDERS ONLY (AUTO-RELEASED) (OUTPATIENT)
Dept: PEDIATRICS | Facility: CLINIC | Age: 55
End: 2025-03-18
Payer: COMMERCIAL

## 2025-03-18 DIAGNOSIS — R55 SYNCOPE, UNSPECIFIED SYNCOPE TYPE: ICD-10-CM

## 2025-03-26 ENCOUNTER — OFFICE VISIT (OUTPATIENT)
Dept: INTERNAL MEDICINE | Facility: CLINIC | Age: 55
End: 2025-03-26
Payer: COMMERCIAL

## 2025-03-26 VITALS
SYSTOLIC BLOOD PRESSURE: 113 MMHG | RESPIRATION RATE: 20 BRPM | HEART RATE: 64 BPM | OXYGEN SATURATION: 100 % | DIASTOLIC BLOOD PRESSURE: 62 MMHG | WEIGHT: 159.38 LBS | TEMPERATURE: 97.6 F | BODY MASS INDEX: 21.12 KG/M2 | HEIGHT: 73 IN

## 2025-03-26 DIAGNOSIS — F20.9 SCHIZOPHRENIA, UNSPECIFIED TYPE (H): ICD-10-CM

## 2025-03-26 DIAGNOSIS — F25.0 SCHIZOAFFECTIVE DISORDER, BIPOLAR TYPE (H): ICD-10-CM

## 2025-03-26 DIAGNOSIS — Z79.899 MEDICATION MANAGEMENT: Primary | ICD-10-CM

## 2025-03-26 DIAGNOSIS — Z72.0 TOBACCO ABUSE: ICD-10-CM

## 2025-03-26 PROCEDURE — 99213 OFFICE O/P EST LOW 20 MIN: CPT | Performed by: INTERNAL MEDICINE

## 2025-03-26 PROCEDURE — 3078F DIAST BP <80 MM HG: CPT | Performed by: INTERNAL MEDICINE

## 2025-03-26 PROCEDURE — 3074F SYST BP LT 130 MM HG: CPT | Performed by: INTERNAL MEDICINE

## 2025-03-26 NOTE — PROGRESS NOTES
"  Assessment & Plan     Medication management      Schizoaffective disorder, bipolar type (H)      Schizophrenia, unspecified type (H)      Tobacco abuse    - nicotine polacrilex (NICORETTE) 4 MG gum; 4 mg, buccal, every hour as needed for nicotine withdrawal symptoms. Chew until tingling, then place between cheek and gum. Repeat. Do not swallow. Not to exceed 96 mg (24 pieces) in a 24 hour period    56 yo male who lives in the group home is here today with his caregiver.  He would like to review his medication list and remove the medications that he is not using.    We reviewed all together. I removed the medications he does not want to use. He asked for the Rx for nicotine gums.    He will follow-up with his PCP Joseph Katz and his Psychiatrist at [facility].            Funmilayo LACEY is a 55 year old, presenting for the following health issues:  OTHER (Refuse to take any medication. Pt want to stop taking all medication/)      3/26/2025     8:48 AM   Additional Questions   Roomed by [employer]   Accompanied by Lead dirctor support for where he is living at     [facility]                    Objective    /62 (BP Location: Right arm, Patient Position: Sitting, Cuff Size: Adult Regular)   Pulse 64   Temp 97.6  F (36.4  C) (Oral)   Resp 20   Ht 1.861 m (6' 1.25\")   Wt 72.3 kg (159 lb 6 oz)   SpO2 100%   BMI 20.88 kg/m    Body mass index is 20.88 kg/m .  Physical Exam               Signed Electronically by: Vinny Collier MD    "

## 2025-04-14 NOTE — TELEPHONE ENCOUNTER
Mayda Albarran APRN CNP to DEEPTHI Marcus         12/12/24  1:09 PM  It is okay to hold aspirin as recommended.   TIERRA Dickens CNP      DISCHARGE

## 2025-05-12 ENCOUNTER — RESULTS FOLLOW-UP (OUTPATIENT)
Dept: PEDIATRICS | Facility: CLINIC | Age: 55
End: 2025-05-12

## 2025-06-06 ENCOUNTER — OFFICE VISIT (OUTPATIENT)
Dept: FAMILY MEDICINE | Facility: CLINIC | Age: 55
End: 2025-06-06
Payer: COMMERCIAL

## 2025-06-06 VITALS
OXYGEN SATURATION: 99 % | DIASTOLIC BLOOD PRESSURE: 72 MMHG | WEIGHT: 158.1 LBS | SYSTOLIC BLOOD PRESSURE: 100 MMHG | BODY MASS INDEX: 20.95 KG/M2 | TEMPERATURE: 97.6 F | HEART RATE: 96 BPM | HEIGHT: 73 IN | RESPIRATION RATE: 12 BRPM

## 2025-06-06 DIAGNOSIS — F19.21 DRUG DEPENDENCE, IN REMISSION (H): ICD-10-CM

## 2025-06-06 DIAGNOSIS — E78.5 HYPERLIPIDEMIA LDL GOAL <70: ICD-10-CM

## 2025-06-06 DIAGNOSIS — R42 DIZZINESS: Primary | ICD-10-CM

## 2025-06-06 DIAGNOSIS — L29.9 ITCHING: ICD-10-CM

## 2025-06-06 DIAGNOSIS — B36.0 TINEA VERSICOLOR: ICD-10-CM

## 2025-06-06 DIAGNOSIS — Z23 NEED FOR VACCINATION: ICD-10-CM

## 2025-06-06 PROBLEM — G95.20 CERVICAL CORD COMPRESSION WITH MYELOPATHY (H): Status: RESOLVED | Noted: 2024-08-12 | Resolved: 2025-06-06

## 2025-06-06 LAB
ALBUMIN SERPL-MCNC: 3.9 G/DL (ref 3.4–5)
ALP SERPL-CCNC: 94 U/L (ref 40–150)
ALT SERPL W P-5'-P-CCNC: 14 U/L (ref 0–70)
ANION GAP SERPL CALCULATED.3IONS-SCNC: 4 MMOL/L (ref 3–14)
AST SERPL W P-5'-P-CCNC: 23 U/L (ref 0–45)
ATRIAL RATE - MUSE: 82 BPM
BILIRUB SERPL-MCNC: 0.6 MG/DL (ref 0.2–1.3)
BUN SERPL-MCNC: 11 MG/DL (ref 7–30)
CALCIUM SERPL-MCNC: 9.4 MG/DL (ref 8.5–10.1)
CHLORIDE BLD-SCNC: 106 MMOL/L (ref 94–109)
CO2 SERPL-SCNC: 29 MMOL/L (ref 20–32)
CREAT SERPL-MCNC: 1.3 MG/DL (ref 0.66–1.25)
DIASTOLIC BLOOD PRESSURE - MUSE: NORMAL MMHG
EGFRCR SERPLBLD CKD-EPI 2021: 65 ML/MIN/1.73M2
ERYTHROCYTE [DISTWIDTH] IN BLOOD BY AUTOMATED COUNT: 14.3 % (ref 10–15)
GLUCOSE BLD-MCNC: 78 MG/DL (ref 70–99)
HCT VFR BLD AUTO: 49.6 % (ref 40–53)
HGB BLD-MCNC: 16 G/DL (ref 13.3–17.7)
INTERPRETATION ECG - MUSE: NORMAL
MCH RBC QN AUTO: 29.6 PG (ref 26.5–33)
MCHC RBC AUTO-ENTMCNC: 32.3 G/DL (ref 31.5–36.5)
MCV RBC AUTO: 92 FL (ref 78–100)
P AXIS - MUSE: 70 DEGREES
PLATELET # BLD AUTO: 280 10E3/UL (ref 150–450)
POTASSIUM BLD-SCNC: 3.6 MMOL/L (ref 3.4–5.3)
PR INTERVAL - MUSE: 148 MS
PROT SERPL-MCNC: 7.8 G/DL (ref 6.8–8.8)
QRS DURATION - MUSE: 80 MS
QT - MUSE: 364 MS
QTC - MUSE: 425 MS
R AXIS - MUSE: 68 DEGREES
RBC # BLD AUTO: 5.4 10E6/UL (ref 4.4–5.9)
SODIUM SERPL-SCNC: 139 MMOL/L (ref 135–145)
SYSTOLIC BLOOD PRESSURE - MUSE: NORMAL MMHG
T AXIS - MUSE: 60 DEGREES
TROPONIN T SERPL HS-MCNC: 6 NG/L
VENTRICULAR RATE- MUSE: 82 BPM
WBC # BLD AUTO: 7.5 10E3/UL (ref 4–11)

## 2025-06-06 PROCEDURE — 93005 ELECTROCARDIOGRAM TRACING: CPT

## 2025-06-06 PROCEDURE — 99215 OFFICE O/P EST HI 40 MIN: CPT

## 2025-06-06 PROCEDURE — 84484 ASSAY OF TROPONIN QUANT: CPT

## 2025-06-06 PROCEDURE — 36415 COLL VENOUS BLD VENIPUNCTURE: CPT

## 2025-06-06 PROCEDURE — 1126F AMNT PAIN NOTED NONE PRSNT: CPT

## 2025-06-06 PROCEDURE — 80053 COMPREHEN METABOLIC PANEL: CPT

## 2025-06-06 PROCEDURE — 93010 ELECTROCARDIOGRAM REPORT: CPT | Performed by: GENERAL ACUTE CARE HOSPITAL

## 2025-06-06 PROCEDURE — 3078F DIAST BP <80 MM HG: CPT

## 2025-06-06 PROCEDURE — 3074F SYST BP LT 130 MM HG: CPT

## 2025-06-06 PROCEDURE — 85027 COMPLETE CBC AUTOMATED: CPT

## 2025-06-06 RX ORDER — KETOCONAZOLE 20 MG/ML
SHAMPOO, SUSPENSION TOPICAL
Qty: 120 ML | Refills: 1 | Status: SHIPPED | OUTPATIENT
Start: 2025-06-06

## 2025-06-06 RX ORDER — AMMONIUM LACTATE 12 G/100G
LOTION TOPICAL 2 TIMES DAILY
Qty: 225 G | Refills: 1 | Status: SHIPPED | OUTPATIENT
Start: 2025-06-06 | End: 2025-06-06

## 2025-06-06 ASSESSMENT — ANXIETY QUESTIONNAIRES
6. BECOMING EASILY ANNOYED OR IRRITABLE: SEVERAL DAYS
GAD7 TOTAL SCORE: 10
7. FEELING AFRAID AS IF SOMETHING AWFUL MIGHT HAPPEN: NOT AT ALL
5. BEING SO RESTLESS THAT IT IS HARD TO SIT STILL: SEVERAL DAYS
8. IF YOU CHECKED OFF ANY PROBLEMS, HOW DIFFICULT HAVE THESE MADE IT FOR YOU TO DO YOUR WORK, TAKE CARE OF THINGS AT HOME, OR GET ALONG WITH OTHER PEOPLE?: SOMEWHAT DIFFICULT
IF YOU CHECKED OFF ANY PROBLEMS ON THIS QUESTIONNAIRE, HOW DIFFICULT HAVE THESE PROBLEMS MADE IT FOR YOU TO DO YOUR WORK, TAKE CARE OF THINGS AT HOME, OR GET ALONG WITH OTHER PEOPLE: SOMEWHAT DIFFICULT
1. FEELING NERVOUS, ANXIOUS, OR ON EDGE: MORE THAN HALF THE DAYS
GAD7 TOTAL SCORE: 10
3. WORRYING TOO MUCH ABOUT DIFFERENT THINGS: MORE THAN HALF THE DAYS
GAD7 TOTAL SCORE: 10
7. FEELING AFRAID AS IF SOMETHING AWFUL MIGHT HAPPEN: NOT AT ALL
2. NOT BEING ABLE TO STOP OR CONTROL WORRYING: MORE THAN HALF THE DAYS
4. TROUBLE RELAXING: MORE THAN HALF THE DAYS

## 2025-06-06 ASSESSMENT — ASTHMA QUESTIONNAIRES
QUESTION_2 LAST FOUR WEEKS HOW OFTEN HAVE YOU HAD SHORTNESS OF BREATH: MORE THAN ONCE A DAY
ACT_TOTALSCORE: 18
QUESTION_4 LAST FOUR WEEKS HOW OFTEN HAVE YOU USED YOUR RESCUE INHALER OR NEBULIZER MEDICATION (SUCH AS ALBUTEROL): NOT AT ALL
QUESTION_3 LAST FOUR WEEKS HOW OFTEN DID YOUR ASTHMA SYMPTOMS (WHEEZING, COUGHING, SHORTNESS OF BREATH, CHEST TIGHTNESS OR PAIN) WAKE YOU UP AT NIGHT OR EARLIER THAN USUAL IN THE MORNING: ONCE OR TWICE
QUESTION_5 LAST FOUR WEEKS HOW WOULD YOU RATE YOUR ASTHMA CONTROL: SOMEWHAT CONTROLLED
QUESTION_1 LAST FOUR WEEKS HOW MUCH OF THE TIME DID YOUR ASTHMA KEEP YOU FROM GETTING AS MUCH DONE AT WORK, SCHOOL OR AT HOME: NONE OF THE TIME

## 2025-06-06 ASSESSMENT — PATIENT HEALTH QUESTIONNAIRE - PHQ9
SUM OF ALL RESPONSES TO PHQ QUESTIONS 1-9: 6
SUM OF ALL RESPONSES TO PHQ QUESTIONS 1-9: 6
10. IF YOU CHECKED OFF ANY PROBLEMS, HOW DIFFICULT HAVE THESE PROBLEMS MADE IT FOR YOU TO DO YOUR WORK, TAKE CARE OF THINGS AT HOME, OR GET ALONG WITH OTHER PEOPLE: NOT DIFFICULT AT ALL

## 2025-06-06 ASSESSMENT — ENCOUNTER SYMPTOMS: SHORTNESS OF BREATH: 1

## 2025-06-06 ASSESSMENT — PAIN SCALES - GENERAL: PAINLEVEL_OUTOF10: NO PAIN (0)

## 2025-06-06 NOTE — PROGRESS NOTES
"  {PROVIDER CHARTING PREFERENCE:719295}    Subjective   DEEPTHI is a 55 year old, presenting for the following health issues:  Dizziness (Started about 3 days ago. Patient is having shortness of breath, blurry vision and hearing changes. Numbness on the arm and foots. Patient stated, \"it could have be stroke ever since I was outside.\") and Recheck Medication        6/6/2025     1:35 PM   Additional Questions   Roomed by Thuan Connell   Accompanied by Self     Shortness of Breath    History of Present Illness       Reason for visit:  Follow up   He is taking medications regularly.        He thinks he had a stroke again, appetite is gone, ear drums are starting to hurting. Feeling dehydrated and dizzy.     Arms, back, legs numb. Brain felt like \"it was gonna die\". He feels dizzy right now.     Didn't drink any fludis.     He is taking all his medications.     He has had a steroid injection in the back.     He is in the group home, but he is trying to find a new place. They are tried to switch him to [facility], he thinks the medications are fake. The nurses told him it was drywall from [facility], reports that nurse was fired. Feels unsafe from the medications at the group home.     Psychiatrist is new, still taking olanzyme.     Reports he was out in the sun, didn't eat for 3-4 days. Didn't drink water.     Smoking less, less than a pack, previously 2 packs.         {ROS Picklists (Optional):443602}      Objective    BP (!) 80/66   Pulse 96   Temp 97.6  F (36.4  C) (Oral)   Resp 12   Ht 1.854 m (6' 1\")   Wt 71.7 kg (158 lb 1.6 oz)   SpO2 99%   BMI 20.86 kg/m    Body mass index is 20.86 kg/m .  Physical Exam   {Exam List (Optional):682232}    {Diagnostic Test Results (Optional):854046}        Signed Electronically by: TIERRA Le CNP  {Email feedback regarding this note to [email or web address]   :[id number]}  " "American: Yes      Diabetic: No      Tobacco smoker: Yes      Systolic Blood Pressure: 100 mmHg      Is BP treated: No      HDL Cholesterol: 48 mg/dL      Total Cholesterol: 181 mg/dL    Drug dependence, in remission (H)  Remission. Sober at this time.     Nicotine/Tobacco Cessation  He reports that he has been smoking cigarettes. He has a 34.5 pack-year smoking history. He has never used smokeless tobacco.  Nicotine/Tobacco Cessation Plan  Information offered: Patient not interested at this time      Subjective   DEEPTHI is a 55 year old, presenting for the following health issues:  Dizziness (Started about 3 days ago. Patient is having shortness of breath, blurry vision and hearing changes. Numbness on the arm and foots. Patient stated, \"it could have be stroke ever since I was outside.\") and Recheck Medication        6/6/2025     1:35 PM   Additional Questions   Roomed by Thuan Connell   Accompanied by Self     Shortness of Breath    History of Present Illness       Reason for visit:  Follow up   He is taking medications regularly.        He thinks he had a stroke again, appetite is gone, ear drums are starting to hurting. Feeling dehydrated and dizzy. He tells me a few days ago he was working gout in the sun and not drinking water. His arms, back, legs numb. Brain felt like \"it was gonna die\". He feels dizzy right now. He is feeling a little better with juice. Reports he was out in the sun, didn't eat for 3-4 days. Didn't drink water.     Didn't drink any fludis.     He is taking all his medications.     He has had a steroid injection in the back. Back pain has been doing okay.     He is in the group home, but he is trying to find a new place. They are tried to switch him to [facility], he thinks the medications are fake. The nurses told him the medication was drywall from [facility], reports that nurse was fired. Feels unsafe from the medications at the group home.     Psychiatrist is new, still taking olanzyme.     Smoking " "less, less than a pack, previously 2 packs.         Objective    BP (!) 80/66   Pulse 96   Temp 97.6  F (36.4  C) (Oral)   Resp 12   Ht 1.854 m (6' 1\")   Wt 71.7 kg (158 lb 1.6 oz)   SpO2 99%   BMI 20.86 kg/m    Body mass index is 20.86 kg/m .  Physical Exam  Constitutional:       General: He is not in acute distress.  Eyes:      Extraocular Movements: Extraocular movements intact.      Conjunctiva/sclera: Conjunctivae normal.      Pupils: Pupils are equal, round, and reactive to light.   Cardiovascular:      Rate and Rhythm: Normal rate and regular rhythm.      Heart sounds: Normal heart sounds. No murmur heard.  Pulmonary:      Effort: Pulmonary effort is normal. No respiratory distress.      Breath sounds: Normal breath sounds. No wheezing or rhonchi.   Musculoskeletal:      Right lower leg: No edema.      Left lower leg: No edema.   Skin:     General: Skin is warm and dry.   Neurological:      General: No focal deficit present.      Mental Status: He is alert. Mental status is at baseline.      Cranial Nerves: Cranial nerves 2-12 are intact. No cranial nerve deficit or facial asymmetry.      Motor: No weakness, tremor, atrophy, abnormal muscle tone or pronator drift.      Coordination: Romberg sign negative. Coordination normal. Finger-Nose-Finger Test and Heel to Shin Test normal. Rapid alternating movements normal.      Gait: Gait and tandem walk normal.   Psychiatric:         Mood and Affect: Mood normal.         Thought Content: Thought content normal.        At the end of the visit, I confirmed understanding of what was discussed. Ihsan has no further questions or concerns that were brought up at this time.      Lizzette Leal DNP, APRN, FNP-C    "

## 2025-06-10 ENCOUNTER — RESULTS FOLLOW-UP (OUTPATIENT)
Dept: FAMILY MEDICINE | Facility: CLINIC | Age: 55
End: 2025-06-10

## 2025-06-10 RX ORDER — SIMVASTATIN 20 MG
20 TABLET ORAL AT BEDTIME
Qty: 90 TABLET | Refills: 0 | Status: SHIPPED | OUTPATIENT
Start: 2025-06-10

## 2025-07-14 ENCOUNTER — PATIENT OUTREACH (OUTPATIENT)
Dept: CARE COORDINATION | Facility: CLINIC | Age: 55
End: 2025-07-14
Payer: COMMERCIAL

## 2025-07-26 ENCOUNTER — NURSE TRIAGE (OUTPATIENT)
Dept: NURSING | Facility: CLINIC | Age: 55
End: 2025-07-26
Payer: COMMERCIAL

## 2025-07-26 NOTE — TELEPHONE ENCOUNTER
Caller, from was with Ihsan. Ihsan gave her consent for me to speak with her.    Nurse Triage SBAR    Is this a 2nd Level Triage? NO    Situation: Medication concern. Dose too low.    Background: The medication is Olanzapine. It's prescribed by a non-[facility] provider.  Caller mentioned that patient is stumbling and believes it's because the dose of Olanzapine is too low. I explained that what I can do is triage based on his symptoms. I said too that I can't change a medication dose. Caller stated okay. Call disconnected.    Assessment: Patient needs to be triaged and directed as to next step.    Protocol Recommended Disposition:   No triage done.    Shira HOLLIDAY RN [facility]

## 2025-07-26 NOTE — TELEPHONE ENCOUNTER
Nurse Triage SBAR    Is this a 2nd Level Triage? YES, LICENSED PRACTITIONER REVIEW IS REQUIRED    Situation: Dizziness     Background: Pt staying in a group home. Today while staff Pinky was coming out of her office she noticed that pt was stumbling around and she reported being afraid pt was going to fall. She states she also noticed that pt is taking the wrong dose of his Zyprexa getting 15mg instead of his scheduled 20 mg. Chart states something different but group home states that this has since changed..     Assessment: /74, HR 61, 02 99%. Pt reporting felling dizzy and lightheaded. He states that he feels like throwing up. He has a head ache and his eyes hurt. Denies any chest pain.     Protocol Recommended Disposition:   Go to ED Now (Or PCP Triage)    Recommendation: Follow care advice. Give pt 5mg of Zyprexa. MD wondering bif he is coming off of it. Also encourage more water questioning dehydration. If pt becomes worse(Increased dizziness, confussion, weakness, speaking changes) bring into ED.     Paged to provider    Does the patient meet one of the following criteria for ADS visit consideration? 16+ years old, with an [facility] PCP     TIP  Providers, please consider if this condition is appropriate for management at one of our Acute and Diagnostic Services sites.     If patient is a good candidate, please use dotphrase <dot>triageresponse and select Refer to ADS to document.    [facility] Provider consult indicated.    Reason for page: Dizziness    Specialty Group number: [id number]  Specialty Group: Primary Care    Initial page sent to Dr. Cornell by RN at 1803.     [facility] No response from Dr. Cornell.     Specialty Group number (same as initial page): [id number]   Specialty Group (same as initial page): Primary Care    Second page sent to Dr. Cornell (same provider as initial page sent) by RN at [phone].    [facility] No response from Dr. Cornell. Two attempts made to  "on-call provider.     Back up Specialty Group number: [id number]   Back up on-call Specialty Group: Primary care    Back up provider, Dr. Roberts, consulted via page sent by RN at 1203.    [facility] No response from Dr. Roberts or Dr. Cornell.      Second back up Specialty Group number: [id number]   Second back up Specialty Group: -Family Medicine    Second back up provider, Dr. Andrew, consulted via call made by Answering Service at 1220.    [facility] Provider, Dr. Andrew, answered call    Provider recommended plan of care: Follow care advice. Give pt 5mg of Zyprexa. MD wondering bif he is coming off of it. Also encourage more water questioning dehydration. If pt becomes worse(Increased dizziness, confussion, weakness, speaking changes) bring into ED.     Provider Recommendation Follow Up:   Reached patient/caregiver. Informed of provider's recommendations. Patient verbalized understanding and agrees with the plan.     Route encounter per Standard Work.          Ashley Bailey RN       Reason for Disposition   Extra heartbeats, irregular heart beating, or heart is beating very fast  (i.e., \"palpitations\")    Additional Information   Negative: SEVERE difficulty breathing (e.g., struggling for each breath, speaks in single words)   Negative: [1] Difficulty breathing or swallowing AND [2] started suddenly after medicine, an allergic food or bee sting   Negative: Shock suspected (e.g., cold/pale/clammy skin, too weak to stand, low BP, rapid pulse)   Negative: Difficult to awaken or acting confused (e.g., disoriented, slurred speech)   Negative: [1] Weakness (i.e., paralysis, loss of muscle strength) of the face, arm or leg on one side of the body AND [2] sudden onset AND [3] present now   Negative: [1] Numbness (i.e., loss of sensation) of the face, arm or leg on one side of the body AND [2] sudden onset AND [3] present now   Negative: [1] Loss of speech or garbled speech AND [2] sudden onset AND [3] " "present now   Negative: Overdose (accidental or intentional) of medications   Negative: [1] Fainted > 15 minutes ago AND [2] still feels too weak or dizzy to stand   Negative: Heart beating < 50 beats per minute OR > 140 beats per minute   Negative: Sounds like a life-threatening emergency to the triager   Negative: Chest pain   Negative: Rectal bleeding, bloody stool, or tarry-black stool   Negative: [1] Vomiting AND [2] contains red blood or black (\"coffee ground\") material   Negative: Vomiting is main symptom   Negative: Diarrhea is main symptom   Negative: Headache is main symptom   Negative: Dizziness from low blood sugar (i.e., < 60 mg/dl or 3.5 mmol/l)   Negative: Difficulty breathing   Negative: Dizziness is described as a spinning sensation (i.e., vertigo)   Negative: Heat exhaustion suspected (i.e., dehydration from heat exposure)   Negative: Patient states that they are having an anxiety or panic attack   Negative: SEVERE dizziness (e.g., unable to stand, requires support to walk, feels like passing out now)    Protocols used: Dizziness - [id number]    "

## 2025-07-28 ENCOUNTER — PATIENT OUTREACH (OUTPATIENT)
Dept: CARE COORDINATION | Facility: CLINIC | Age: 55
End: 2025-07-28
Payer: COMMERCIAL

## 2025-08-13 ENCOUNTER — LAB (OUTPATIENT)
Dept: LAB | Facility: CLINIC | Age: 55
End: 2025-08-13
Payer: COMMERCIAL

## 2025-08-13 DIAGNOSIS — Z13.6 SCREENING FOR CARDIOVASCULAR CONDITION: Primary | ICD-10-CM

## 2025-08-13 DIAGNOSIS — E78.5 HYPERLIPIDEMIA: ICD-10-CM

## 2025-08-25 ENCOUNTER — LAB (OUTPATIENT)
Dept: LAB | Facility: CLINIC | Age: 55
End: 2025-08-25
Payer: COMMERCIAL

## 2025-08-25 DIAGNOSIS — Z79.899 HIGH RISK MEDICATION USE: ICD-10-CM

## 2025-08-25 LAB
CHOLEST SERPL-MCNC: 162 MG/DL
EST. AVERAGE GLUCOSE BLD GHB EST-MCNC: 103 MG/DL
FASTING STATUS PATIENT QL REPORTED: ABNORMAL
FASTING STATUS PATIENT QL REPORTED: NORMAL
GLUCOSE SERPL-MCNC: 113 MG/DL (ref 70–99)
HBA1C MFR BLD: 5.2 % (ref 0–5.6)
HDLC SERPL-MCNC: 56 MG/DL
LDLC SERPL CALC-MCNC: 96 MG/DL
NONHDLC SERPL-MCNC: 106 MG/DL
TRIGL SERPL-MCNC: 50 MG/DL

## 2025-08-25 PROCEDURE — 80061 LIPID PANEL: CPT

## 2025-08-25 PROCEDURE — 82947 ASSAY GLUCOSE BLOOD QUANT: CPT

## 2025-08-25 PROCEDURE — 83036 HEMOGLOBIN GLYCOSYLATED A1C: CPT

## 2025-08-25 PROCEDURE — 36415 COLL VENOUS BLD VENIPUNCTURE: CPT

## 2025-09-04 DIAGNOSIS — E78.5 HYPERLIPIDEMIA LDL GOAL <70: ICD-10-CM

## 2025-09-04 RX ORDER — SIMVASTATIN 20 MG
20 TABLET ORAL AT BEDTIME
Qty: 90 TABLET | Refills: 2 | Status: SHIPPED | OUTPATIENT
Start: 2025-09-04

## (undated) DEVICE — SOL NACL 0.9% IRRIG 1000ML BOTTLE 2F7124

## (undated) DEVICE — GOWN LG DISP 9515

## (undated) DEVICE — DRILL BIT MEDT ELITE 11MM SS 7080510

## (undated) DEVICE — ESU PENCIL SMOKE EVAC W/ROCKER SWITCH 0703-047-000

## (undated) DEVICE — DRAPE C-ARM 60X42" 1013

## (undated) DEVICE — SU MONOCRYL+ 4-0 18IN PS2 UND MCP496G

## (undated) DEVICE — NEEDLE SPINAL DISP 22GA X 3.5" QUINCKE 333320

## (undated) DEVICE — GLOVE BIOGEL PI ULTRATOUCH G SZ 6.5 42165

## (undated) DEVICE — PITCHER STERILE 1000ML  SSK9004A

## (undated) DEVICE — PLATE GROUNDING ADULT W/CORD 9165L

## (undated) DEVICE — SPONGE NEURO 1/2X1/2 WECK 200100

## (undated) DEVICE — DRAPE SHEET THYROID 77X123 89255

## (undated) DEVICE — IOM STANDARD SUPPLY FEE

## (undated) DEVICE — SU ETHILON 3-0 FS-1 18" 669H

## (undated) DEVICE — NEEDLE HYPO 18X1-1/2 SAFETY 305918

## (undated) DEVICE — TUBING SUCTION MEDI-VAC 1/4"X20' N620A - HE

## (undated) DEVICE — PREP CHLORAPREP W/ORANGE TINT 10.5ML 930715

## (undated) DEVICE — DRESSING PRIMAPORE 4 X 3-1/8 66000317

## (undated) DEVICE — DRAIN RESERVOIR 100ML JP 0070740

## (undated) DEVICE — SOL WATER IRRIG 1000ML BOTTLE 2F7114

## (undated) DEVICE — PREP SCRUB SOL EXIDINE 4% CHG 4OZ 29002-404

## (undated) DEVICE — Device

## (undated) DEVICE — DRAIN JACKSON PRATT 07FR ROUND SU130-1320

## (undated) DEVICE — CUSTOM PACK LUMBAR FUSION SNE5BLFHEA

## (undated) DEVICE — MARKER SURG SKIN STRL 77734

## (undated) DEVICE — GLOVE UNDER INDICATOR PI SZ 6.5 LF 41665

## (undated) DEVICE — DRAPE C-ARMOR 5 SIDED 5523

## (undated) DEVICE — DRSG PRIMAPORE 03 1/8X6" 66000318

## (undated) DEVICE — SUTURE VICRYL+ 3-0 18 SH/CR UND VCP864

## (undated) DEVICE — SYRINGE EAR/ULCER 3 OZ MEDICHOICE

## (undated) DEVICE — IOM CASE FLAT FEE

## (undated) DEVICE — PIN DISTRACTION 14MM TI YELLOW DP-14-TY

## (undated) DEVICE — ESU ELEC BLADE 2.75" COATED/INSULATED E1455

## (undated) DEVICE — TOOL DISSECT MIDAS MR8 14CM MATCH HEAD 3MM MR8-14MH30

## (undated) DEVICE — RX SURGIFLO HEMOSTATIC MATRIX W/THROMBIN 8ML 2994

## (undated) DEVICE — DRSG KERLIX 4 1/2"X4YDS ROLL 6715

## (undated) DEVICE — TRAY PREP DRY SKIN SCRUB 067

## (undated) DEVICE — CATH TRAY FOLEY SURESTEP 16FR DRAIN BAG STATOCK A899916

## (undated) DEVICE — TAPE ADH POROUS 3IN CURITY STD 7046C

## (undated) DEVICE — IMP SCR MEDT ZEVO 3.5X17MM SD VA 7713517
Type: IMPLANTABLE DEVICE | Site: NECK | Status: NON-FUNCTIONAL
Removed: 2022-05-06

## (undated) DEVICE — ADH SKIN CLOSURE PREMIERPRO EXOFIN 1.0ML 3470

## (undated) DEVICE — ALCOHOL ISOPROPYL 4 OZ 70% IA7004

## (undated) RX ORDER — FENTANYL CITRATE 50 UG/ML
INJECTION, SOLUTION INTRAMUSCULAR; INTRAVENOUS
Status: DISPENSED
Start: 2022-05-06

## (undated) RX ORDER — PROPOFOL 10 MG/ML
INJECTION, EMULSION INTRAVENOUS
Status: DISPENSED
Start: 2022-05-06